# Patient Record
Sex: FEMALE | Race: WHITE | NOT HISPANIC OR LATINO
[De-identification: names, ages, dates, MRNs, and addresses within clinical notes are randomized per-mention and may not be internally consistent; named-entity substitution may affect disease eponyms.]

---
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
## 2021-01-01 ENCOUNTER — EXTERNAL RECORD (OUTPATIENT)
Dept: HEALTH INFORMATION MANAGEMENT | Facility: OTHER | Age: 21
End: 2021-01-01

## 2021-01-13 ENCOUNTER — E-ADVICE (OUTPATIENT)
Dept: BEHAVIORAL HEALTH | Age: 21
End: 2021-01-13

## 2021-01-13 ENCOUNTER — TELEPHONE (OUTPATIENT)
Dept: BEHAVIORAL HEALTH | Age: 21
End: 2021-01-13

## 2021-02-09 ENCOUNTER — TELEPHONE (OUTPATIENT)
Dept: BEHAVIORAL HEALTH | Age: 21
End: 2021-02-09

## 2021-02-16 DIAGNOSIS — Z79.899 HIGH RISK MEDICATION USE: Primary | ICD-10-CM

## 2021-02-16 RX ORDER — METHYLPHENIDATE HYDROCHLORIDE 20 MG/1
TABLET ORAL
Qty: 1 TABLET | Refills: 0 | Status: SHIPPED | COMMUNITY
Start: 2021-02-16 | End: 2021-02-16 | Stop reason: SDUPTHER

## 2021-02-16 RX ORDER — METHYLPHENIDATE HYDROCHLORIDE 20 MG/1
TABLET ORAL
Qty: 1 TABLET | Refills: 0 | Status: SHIPPED | OUTPATIENT
Start: 2021-02-16 | End: 2021-04-19 | Stop reason: SDUPTHER

## 2021-03-03 NOTE — ED PROVIDER NOTES
Patient Seen in: [facility] Emergency Department      History   Patient presents with:  Eval-P    Stated Complaint: eval p     HPI/Subjective:   HPI    Patient has a history of depression, anxiety, with past hospitalizations.   She is attempted suici guarding. Musculoskeletal: No swelling, deformity or ecchymosis. Neurological: Pt is alert and oriented to person, place, and time. No cranial nerve deficit. Skin: Skin is warm and dry. Psychiatric: Normal mood and affect.  Thought content norm

## 2021-03-04 NOTE — ED NOTES
[facility] states they have 1 female bed available and are asking for the transfer packet to be faxed. Awaiting UDS, pregnancy test result, and EKG to fax packet to [facility].

## 2021-03-04 NOTE — ED NOTES
Pt offered toiletries and opportunity to clean up but pt refusing. Pt only requesting mesh panties/pad. Pt also given ice water per request.    Pt up to restroom accompanied by staff. Pt aware family updated on transfer and awaiting ambulance.

## 2021-03-04 NOTE — ED NOTES
Report received from Leander Hall, [place]. Patient is sleeping on the cot in no apparent distress. No needs expressed at this time.

## 2021-03-04 NOTE — ED NOTES
Davonte Corbin is inquiring about pt's level of functioning due to a reported recent diagnosis of Autism. Per ER RN, Randall Ramirez, pt is high functioning and can perform tasks independently. Pt is employed and attends community college.

## 2021-03-04 NOTE — ED NOTES
Per permission granted by patient, spoke with patient's mother, Dominique Bryson, on the phone and gave her an update.

## 2021-03-04 NOTE — ED NOTES
Nurse to nurse report given to Renetta Dago on the [place] unit. They are ready for patient to be transferred and are asking that we call when patient is leaving.  [phone]

## 2021-03-04 NOTE — ED PROVIDER NOTES
No events during my shift.   Patient awaiting placement by [facility]

## 2021-03-04 NOTE — ED NOTES
Report received from [facility]. Pt awake and alert, appears comfortable and in no distress.

## 2021-03-04 NOTE — ED NOTES
Pt awake and alert, skin w/d, resps reg/unlabored. Pt appears comfortable. Belongings given to EMS for transfer.

## 2021-03-04 NOTE — ED NOTES
Pt accepted to [facility]. Accepting doc is Dr. Jarad Chauhan. Pt will be going to [place] unit in the [facility].  Site requests that we call site to complete nurse to nurse at 8:30am. RN aware and site details and nurse to nurse number listed

## 2021-03-04 NOTE — ED NOTES
Pt mother Shannan called pt ok with nursing staff speaking to mother .  Pt mother would like a call back when transferred

## 2021-03-29 ENCOUNTER — TELEPHONE (OUTPATIENT)
Dept: BEHAVIORAL HEALTH | Age: 21
End: 2021-03-29

## 2021-04-02 NOTE — ED PROVIDER NOTES
Patient Seen in: [facility] AND [facility] Emergency Department      History   Patient presents with:  Eval-P    Stated Complaint: needs medical clearance for psych transfer    HPI/Subjective:   HPI    28-year-old female just out of L2 3 days ago with a history tenderness. Neurological: Alert and oriented to person, place, and time. Skin: Skin is warm and dry. Psychiatric: Normal mood and affect. Behavior is normal.  Positive depressive thoughts and suicidality  Nursing note and vitals reviewed.     Differe CBC WITH DIFFERENTIAL WITH PLATELET.   Procedure                               Abnormality         Status                     ---------                               -----------         ------                     CBC W/ DIFFERENTIAL[[id number]]          Abno

## 2021-04-02 NOTE — ED NOTES
Writer called patient's mother and updated her on POC. Patient's mother requests placement for her daughter at [facility] or [facility]. Writer explained there were no beds for pt at [facility] and she would check into [facility]. Mother stated [facility] would be a last resort.  Michael

## 2021-04-02 NOTE — ED QUICK NOTES
Call from [facility]-ER, pts mom, stating that pts aunt Gwen Scruggs was the one who took her to the ER, but mom requesting to be consulted by crisis worker regarding pts plan and information. Phone number for mom: [phone]. Crisis Clinician updated.    Verbal co

## 2021-04-02 NOTE — ED INITIAL ASSESSMENT (HPI)
Dima Cortez is here via EMS for eval of suicidal ideation. She came from [facility] assessment center in [facility]. She has been depressed and has had thoughts of overdosing on medications and strangulation to kill herself.   She has been researching h

## 2021-04-02 NOTE — ED NOTES
Yvonne January reported that are having trouble verifying patient's insurance. Double checked RIN #.

## 2021-04-02 NOTE — ED QUICK NOTES
Pt resting quietly in bed, eating meal tray. Additional PO fluids provided. Bed is in low & locked position. Call light in reach. ER tech outside room for seclusion. Pt aware of plan of care. Will continue to monitor.

## 2021-04-02 NOTE — ED NOTES
William Clifford called and said that [facility] would most likely not have the staff for a 1:1 for patient. She would confirm later after checking other facilities.

## 2021-04-02 NOTE — ED NOTES
Writer spoke to pt's aunt; Cash Sunshine and she reports mother Kimmy Arias) is also involved with patient's discharge planning and care.  Patient is staying with her grandparents at:     [place] [id number]

## 2021-04-03 NOTE — ED QUICK NOTES
Pt's mom exited department. Pt's belongings given from public safety to pts mom to be taken home.   Pt tearful after her mom left, stating that she is lonely and wants her stuffed animal.   This RN found a stuffed animal from the ED, inspected it for safety

## 2021-04-03 NOTE — ED NOTES
Updated mother on patient's insurance information. Patient is considered unfunded and cannot be transferred outside of [facility] until insurance is verified. Writer asked mother to bring patient's daily prescriptions so that she does not miss any doses.

## 2021-04-03 NOTE — ED QUICK NOTES
Pt resting quietly in bed. Bed is in low & locked position. Call light in reach. ER tech outside room for seclusion. Pt aware of plan of care. Will continue to monitor.

## 2021-04-03 NOTE — ED NOTES
Attempted to verify patient's insurance, Darya Afshin [phone] did not have any record of patient. Passport verification is negative for patient insurance.

## 2021-04-03 NOTE — ED NOTES
Called Bee to verify pt benefits. Called [phone]. Spoke with Shereen Holland. They are not seeing any active insurance for Sandy or any eligible dependents associated with this insurance policy.      Shereen Holland states that perhaps the pt was added yesterday if that

## 2021-04-03 NOTE — ED QUICK NOTES
Primary RN out for lunch break. Monitoring and assuming temporary care for patient. Patient requesting trazodone and lorazepam, requesting MD for order.

## 2021-04-03 NOTE — ED QUICK NOTES
Pt eating meal tray. Declines any additional needs at this time. Bed is in low & locked position. Call light in reach. ER tech outside room for seclusion. Pt aware of plan of care. Will continue to monitor.

## 2021-04-03 NOTE — ED QUICK NOTES
Pt's mom arrived at bedside. Aware of plan of care. Pt declines any additional needs at this time. Will continue to monitor.

## 2021-04-03 NOTE — ED QUICK NOTES
Pt sitting quietly in bed. Pt declines any additional needs at this time. Bed is in low & locked position. Call light in reach. ER tech outside room for seclusion. Pt aware of plan of care. Will continue to monitor.

## 2021-04-03 NOTE — ED NOTES
Bhavin - no beds at least until Tuesday   [facility] aware of pt

## 2021-04-03 NOTE — ED PROVIDER NOTES
Patient endorsed to me by Dr. Abbie Kehr for continuation of care. Patient is awaiting inpatient psychiatric transfer for suicidal ideation. Patient has been calm throughout my shift and was endorsed to  [facility] for continuation of care.

## 2021-04-04 NOTE — ED NOTES
I assumed care for this patient. Patient is awaiting inpatient psychiatric transfer for suicidal ideation and has been calm throughout my shift. Patient endorsed to Dr. Chris Vanessa for continuation of care.

## 2021-04-04 NOTE — ED QUICK NOTES
Spoke with [facility] rankdesk personnel for potential placement at [facility]. Report given. Personnel requesting patient labs, and medications (MAR) be faxed to them at [phone].

## 2021-04-04 NOTE — ED NOTES
Call from [address] I with Eliceo Alejo. [place] has been accepted for admission to [facility] under the care of Dr Renetta Pena. She will be admitted to room 2302. Transport can be scheduled to leave the ER at 10 a.m.

## 2021-04-04 NOTE — ED QUICK NOTES
Pt requested additional PO fluids and snack. Both PO fluid and snack provided. Pt pleasant with RN. Continuing to color in coloring book. Bed is in low & locked position. Call light in reach. ER tech outside room for seclusion. Pt aware of plan of care.  Wi

## 2021-04-04 NOTE — ED QUICK NOTES
Report received from [facility]. Pt resting quietly in bed, coloring in coloring book. Bed is in low & locked position. Call light in reach. ER tech outside room for seclusion. Pt aware of plan of care. Will continue to monitor.

## 2021-04-04 NOTE — ED QUICK NOTES
Assumed care for pt @ this time, pt currently resting in bed easily rousable, respirations unlabored, no acute distress. Continuing to monitor. Patient remains under constant visual observation by sitter in safe room.

## 2021-04-04 NOTE — ED QUICK NOTES
Pt now resting quietly in bed. PO fluids within reach. Bed is in low & locked position. Call light in reach. ER tech outside room for seclusion. Pt aware of plan of care. Will continue to monitor.

## 2021-04-04 NOTE — ED QUICK NOTES
Pt now ambulating around room, states that she cannot fall asleep. Pt declines any additional needs at this time. ER tech outside room for seclusion. Pt aware of plan of care. Will continue to monitor.

## 2021-04-12 ENCOUNTER — TELEPHONE (OUTPATIENT)
Dept: BEHAVIORAL HEALTH | Age: 21
End: 2021-04-12

## 2021-04-19 ENCOUNTER — V-VISIT (OUTPATIENT)
Dept: BEHAVIORAL HEALTH | Age: 21
End: 2021-04-19

## 2021-04-19 DIAGNOSIS — F41.1 GAD (GENERALIZED ANXIETY DISORDER): ICD-10-CM

## 2021-04-19 DIAGNOSIS — F90.2 ATTENTION DEFICIT HYPERACTIVITY DISORDER (ADHD), COMBINED TYPE: ICD-10-CM

## 2021-04-19 DIAGNOSIS — E66.9 OBESITY (BMI 30.0-34.9): ICD-10-CM

## 2021-04-19 DIAGNOSIS — F31.60 BIPOLAR 1 DISORDER, MIXED (CMD): Primary | ICD-10-CM

## 2021-04-19 PROCEDURE — 90833 PSYTX W PT W E/M 30 MIN: CPT | Performed by: PSYCHIATRY & NEUROLOGY

## 2021-04-19 PROCEDURE — 99214 OFFICE O/P EST MOD 30 MIN: CPT | Performed by: PSYCHIATRY & NEUROLOGY

## 2021-04-19 RX ORDER — METHYLPHENIDATE HYDROCHLORIDE 60 MG/1
60 CAPSULE, EXTENDED RELEASE ORAL EVERY MORNING
Qty: 30 CAPSULE | Refills: 0 | Status: SHIPPED | OUTPATIENT
Start: 2021-06-14 | End: 2021-07-20

## 2021-04-19 RX ORDER — METHYLPHENIDATE HYDROCHLORIDE 60 MG/1
60 CAPSULE, EXTENDED RELEASE ORAL EVERY MORNING
Qty: 30 CAPSULE | Refills: 0 | Status: SHIPPED | OUTPATIENT
Start: 2021-05-17 | End: 2021-07-20

## 2021-04-19 RX ORDER — LORAZEPAM 1 MG/1
1 TABLET ORAL EVERY 6 HOURS PRN
Status: SHIPPED | COMMUNITY
Start: 2021-04-19 | End: 2021-07-20

## 2021-04-19 RX ORDER — METHYLPHENIDATE HYDROCHLORIDE 20 MG/1
TABLET ORAL
Qty: 30 TABLET | Refills: 0 | Status: SHIPPED | OUTPATIENT
Start: 2021-06-14 | End: 2021-07-20

## 2021-04-19 RX ORDER — METHYLPHENIDATE HYDROCHLORIDE 20 MG/1
TABLET ORAL
Qty: 30 TABLET | Refills: 0 | Status: SHIPPED | OUTPATIENT
Start: 2021-04-19 | End: 2021-07-20

## 2021-04-19 RX ORDER — METHYLPHENIDATE HYDROCHLORIDE 60 MG/1
60 CAPSULE, EXTENDED RELEASE ORAL EVERY MORNING
Qty: 30 CAPSULE | Refills: 0 | Status: SHIPPED | OUTPATIENT
Start: 2021-04-19 | End: 2021-07-20

## 2021-04-19 RX ORDER — TRAZODONE HYDROCHLORIDE 50 MG/1
50 TABLET ORAL NIGHTLY
Qty: 30 TABLET | Refills: 0 | Status: SHIPPED | OUTPATIENT
Start: 2021-04-19 | End: 2021-06-23 | Stop reason: DRUGHIGH

## 2021-04-19 RX ORDER — METHYLPHENIDATE HYDROCHLORIDE 20 MG/1
TABLET ORAL
Qty: 30 TABLET | Refills: 0 | Status: SHIPPED | OUTPATIENT
Start: 2021-05-17 | End: 2021-07-20

## 2021-04-21 ENCOUNTER — TELEPHONE (OUTPATIENT)
Dept: BEHAVIORAL HEALTH | Age: 21
End: 2021-04-21

## 2021-06-08 ENCOUNTER — EXTERNAL RECORD (OUTPATIENT)
Dept: HEALTH INFORMATION MANAGEMENT | Facility: OTHER | Age: 21
End: 2021-06-08

## 2021-06-08 ENCOUNTER — TELEPHONE (OUTPATIENT)
Dept: BEHAVIORAL HEALTH | Age: 21
End: 2021-06-08

## 2021-06-14 PROBLEM — F33.2 RECURRENT MAJOR DEPRESSION-SEVERE (HCC): Status: ACTIVE | Noted: 2021-06-14

## 2021-06-15 PROBLEM — D50.9 MICROCYTIC ANEMIA: Status: ACTIVE | Noted: 2021-06-15

## 2021-06-15 PROBLEM — J30.9 ALLERGIC RHINITIS: Status: ACTIVE | Noted: 2021-06-15

## 2021-06-15 PROBLEM — E66.01 MORBID OBESITY (HCC): Status: ACTIVE | Noted: 2021-06-15

## 2021-06-15 PROBLEM — R73.03 PREDIABETES: Status: ACTIVE | Noted: 2021-06-15

## 2021-06-16 PROBLEM — E61.1 IRON DEFICIENCY: Status: ACTIVE | Noted: 2021-06-16

## 2021-06-23 ENCOUNTER — TELEPHONE (OUTPATIENT)
Dept: BEHAVIORAL HEALTH | Age: 21
End: 2021-06-23

## 2021-06-23 RX ORDER — GABAPENTIN 300 MG/1
300 CAPSULE ORAL 3 TIMES DAILY
Qty: 1 CAPSULE | Status: SHIPPED | COMMUNITY
Start: 2021-06-23 | End: 2021-07-20

## 2021-06-23 RX ORDER — LURASIDONE HYDROCHLORIDE 40 MG/1
40 TABLET, FILM COATED ORAL
Qty: 1 TABLET | Status: SHIPPED | COMMUNITY
Start: 2021-06-23 | End: 2021-07-20 | Stop reason: DRUGHIGH

## 2021-06-23 RX ORDER — SERTRALINE HYDROCHLORIDE 100 MG/1
TABLET, FILM COATED ORAL
Qty: 1 TABLET | Status: SHIPPED | COMMUNITY
Start: 2021-06-23 | End: 2021-07-20

## 2021-06-23 RX ORDER — CETIRIZINE HYDROCHLORIDE 10 MG/1
10 TABLET ORAL DAILY
Qty: 1 TABLET | Status: SHIPPED | COMMUNITY
Start: 2021-06-23

## 2021-06-23 RX ORDER — TRAZODONE HYDROCHLORIDE 150 MG/1
150 TABLET ORAL NIGHTLY
Qty: 1 TABLET | Status: SHIPPED | COMMUNITY
Start: 2021-06-23 | End: 2021-07-20 | Stop reason: SDUPTHER

## 2021-07-06 PROBLEM — F33.2 MDD (MAJOR DEPRESSIVE DISORDER), RECURRENT EPISODE, SEVERE (HCC): Status: ACTIVE | Noted: 2021-07-06

## 2021-07-07 PROBLEM — Z72.89 SELF-INJURIOUS BEHAVIOR: Status: ACTIVE | Noted: 2021-07-07

## 2021-07-20 ENCOUNTER — BEHAVIORAL HEALTH (OUTPATIENT)
Dept: BEHAVIORAL HEALTH | Age: 21
End: 2021-07-20

## 2021-07-20 VITALS
DIASTOLIC BLOOD PRESSURE: 74 MMHG | WEIGHT: 293 LBS | SYSTOLIC BLOOD PRESSURE: 122 MMHG | BODY MASS INDEX: 48.82 KG/M2 | HEART RATE: 96 BPM | OXYGEN SATURATION: 97 % | HEIGHT: 65 IN

## 2021-07-20 DIAGNOSIS — F34.81 DMDD (DISRUPTIVE MOOD DYSREGULATION DISORDER) (CMD): ICD-10-CM

## 2021-07-20 DIAGNOSIS — F90.2 ATTENTION DEFICIT HYPERACTIVITY DISORDER (ADHD), COMBINED TYPE: ICD-10-CM

## 2021-07-20 DIAGNOSIS — F41.1 GAD (GENERALIZED ANXIETY DISORDER): ICD-10-CM

## 2021-07-20 DIAGNOSIS — F31.60 BIPOLAR 1 DISORDER, MIXED (CMD): Primary | ICD-10-CM

## 2021-07-20 DIAGNOSIS — Z63.9 CONFLICT BETWEEN PATIENT AND FAMILY: ICD-10-CM

## 2021-07-20 DIAGNOSIS — E66.9 OBESITY (BMI 30.0-34.9): ICD-10-CM

## 2021-07-20 PROCEDURE — 90833 PSYTX W PT W E/M 30 MIN: CPT | Performed by: PSYCHIATRY & NEUROLOGY

## 2021-07-20 PROCEDURE — 99214 OFFICE O/P EST MOD 30 MIN: CPT | Performed by: PSYCHIATRY & NEUROLOGY

## 2021-07-20 RX ORDER — SERTRALINE HYDROCHLORIDE 100 MG/1
200 TABLET, FILM COATED ORAL DAILY
Qty: 60 TABLET | Refills: 0 | Status: SHIPPED | OUTPATIENT
Start: 2021-07-20

## 2021-07-20 RX ORDER — LURASIDONE HYDROCHLORIDE 60 MG/1
60 TABLET, FILM COATED ORAL NIGHTLY
Qty: 30 TABLET | Refills: 0 | Status: SHIPPED | COMMUNITY
Start: 2021-07-20 | End: 2021-07-20 | Stop reason: SDUPTHER

## 2021-07-20 RX ORDER — LURASIDONE HYDROCHLORIDE 60 MG/1
60 TABLET, FILM COATED ORAL NIGHTLY
Qty: 30 TABLET | Refills: 0 | Status: SHIPPED | OUTPATIENT
Start: 2021-07-20

## 2021-07-20 RX ORDER — SERTRALINE HYDROCHLORIDE 25 MG/1
25 TABLET, FILM COATED ORAL DAILY
Qty: 30 TABLET | Refills: 0 | Status: SHIPPED | COMMUNITY
Start: 2021-07-20 | End: 2021-07-20 | Stop reason: SDUPTHER

## 2021-07-20 RX ORDER — TRAZODONE HYDROCHLORIDE 150 MG/1
150 TABLET ORAL NIGHTLY
Qty: 30 TABLET | Refills: 0 | Status: SHIPPED | OUTPATIENT
Start: 2021-07-20

## 2021-07-20 RX ORDER — SERTRALINE HYDROCHLORIDE 100 MG/1
200 TABLET, FILM COATED ORAL DAILY
Qty: 60 TABLET | Refills: 0 | Status: SHIPPED | COMMUNITY
Start: 2021-07-20 | End: 2021-07-20 | Stop reason: SDUPTHER

## 2021-07-20 RX ORDER — SERTRALINE HYDROCHLORIDE 25 MG/1
25 TABLET, FILM COATED ORAL DAILY
Qty: 30 TABLET | Refills: 0 | Status: SHIPPED | OUTPATIENT
Start: 2021-07-20 | End: 2021-08-23

## 2021-07-20 SDOH — SOCIAL STABILITY - SOCIAL INSECURITY: PROBLEM RELATED TO PRIMARY SUPPORT GROUP, UNSPECIFIED: Z63.9

## 2021-07-30 ENCOUNTER — OFFICE VISIT (OUTPATIENT)
Dept: FAMILY MEDICINE CLINIC | Facility: CLINIC | Age: 21
End: 2021-07-30
Payer: COMMERCIAL

## 2021-07-30 VITALS
HEIGHT: 65 IN | BODY MASS INDEX: 48.82 KG/M2 | DIASTOLIC BLOOD PRESSURE: 70 MMHG | HEART RATE: 107 BPM | SYSTOLIC BLOOD PRESSURE: 128 MMHG | WEIGHT: 293 LBS | RESPIRATION RATE: 18 BRPM | TEMPERATURE: 98 F | OXYGEN SATURATION: 99 %

## 2021-07-30 DIAGNOSIS — F33.2 SEVERE EPISODE OF RECURRENT MAJOR DEPRESSIVE DISORDER, WITHOUT PSYCHOTIC FEATURES (HCC): ICD-10-CM

## 2021-07-30 DIAGNOSIS — Z30.011 INITIATION OF OCP (BCP): Primary | ICD-10-CM

## 2021-07-30 LAB — CONTROL LINE PRESENT WITH A CLEAR BACKGROUND (YES/NO): YES YES/NO

## 2021-07-30 PROCEDURE — 3008F BODY MASS INDEX DOCD: CPT | Performed by: FAMILY MEDICINE

## 2021-07-30 PROCEDURE — 3078F DIAST BP <80 MM HG: CPT | Performed by: FAMILY MEDICINE

## 2021-07-30 PROCEDURE — 3074F SYST BP LT 130 MM HG: CPT | Performed by: FAMILY MEDICINE

## 2021-07-30 PROCEDURE — 81025 URINE PREGNANCY TEST: CPT | Performed by: FAMILY MEDICINE

## 2021-07-30 PROCEDURE — 99214 OFFICE O/P EST MOD 30 MIN: CPT | Performed by: FAMILY MEDICINE

## 2021-07-30 RX ORDER — IBUPROFEN 600 MG/1
TABLET ORAL
COMMUNITY
Start: 2021-06-22 | End: 2021-08-19

## 2021-07-30 RX ORDER — GABAPENTIN 300 MG/1
300 CAPSULE ORAL EVERY 6 HOURS PRN
COMMUNITY
Start: 2021-05-19 | End: 2021-09-20

## 2021-07-30 RX ORDER — NEOMYCIN/POLYMYXIN B/PRAMOXINE 3.5-10K-1
CREAM (GRAM) TOPICAL
COMMUNITY
End: 2021-08-19

## 2021-07-30 RX ORDER — NORGESTIMATE AND ETHINYL ESTRADIOL 7DAYSX3 LO
1 KIT ORAL DAILY
Qty: 28 TABLET | Refills: 0 | Status: SHIPPED | OUTPATIENT
Start: 2021-07-30 | End: 2021-08-23

## 2021-07-30 NOTE — PROGRESS NOTES
Patient presents with: Follow - Up: EKG review        HPI:    Patient ID: [place] is a 24year old adult referred back to PCP to review recent EKG done at [facility].   Admitted to [facility] from 7/6 to 7/19 for depression, anxiety and borderli total) by mouth daily. 30 tablet 1   • traZODone HCl 150 MG Oral Tab Take 1 tablet (150 mg total) by mouth nightly. 30 tablet 1   • Cholecalciferol (VITAMIN D) 50 MCG (2000 UT) Oral Cap Take 2,000 Units by mouth daily.      • Fluticasone Propionate 50 MCG/A Rhythm: Normal rate and regular rhythm. Heart sounds: No murmur heard. Pulmonary:      Effort: Pulmonary effort is normal.      Breath sounds: Normal breath sounds. Abdominal:      Tenderness: There is no abdominal tenderness.    Musculoskeletal: Encounter      Urine Preg Test      Meds This Visit:  Requested Prescriptions     Signed Prescriptions Disp Refills   • Norgestim-Eth Estrad Triphasic (ORTHO TRI-CYCLEN LO) 0.18/0.215/0.25 MG-25 MCG Oral Tab 28 tablet 0     Sig: Take 1 tablet by mouth minerva

## 2021-08-15 PROBLEM — F31.60 BIPOLAR 1 DISORDER, MIXED (CMD): Status: ACTIVE | Noted: 2021-08-15

## 2021-08-20 PROBLEM — Z30.41 ORAL CONTRACEPTIVE USE: Status: ACTIVE | Noted: 2021-08-20

## 2021-08-23 RX ORDER — SERTRALINE HYDROCHLORIDE 25 MG/1
TABLET, FILM COATED ORAL
Qty: 30 TABLET | Refills: 0 | Status: SHIPPED | OUTPATIENT
Start: 2021-08-23 | End: 2021-09-20

## 2021-08-23 NOTE — TELEPHONE ENCOUNTER
Please call and check if patient started her birth control. Due for follow-up before refill.   Okay to give bridging prescription if she will be out of medication before she can get into see me

## 2021-08-23 NOTE — TELEPHONE ENCOUNTER
Gynecology Medication Protocol [id number]/23/2021 12:34 AM   PASS-PENDING LAST PAP WNL--VIA MANUAL LOOKUP    Physical or Pelvic/Breast in past 12 or next 3 mos--VIA MANUAL LOOKUP     OV 7/30/21  Refill 7/30/21 #28 0 refill  No future appointments.

## 2021-08-27 RX ORDER — NORGESTIMATE AND ETHINYL ESTRADIOL
KIT
Qty: 1 EACH | Refills: 0 | Status: SHIPPED | OUTPATIENT
Start: 2021-08-27 | End: 2021-09-20

## 2021-08-27 NOTE — TELEPHONE ENCOUNTER
Pt states she did start her birth control 8/3/21. OV scheduled. Future Appointments   Date Time Provider Joel Borjai   9/8/2021 11:00 AM MD JOYCE RiversOSW [facility]        Rx sent.

## 2021-08-30 ENCOUNTER — HOSPITAL ENCOUNTER (EMERGENCY)
Facility: HOSPITAL | Age: 21
Discharge: NURSING, REHAB OR LONG-TERM CARE | End: 2021-08-31
Attending: EMERGENCY MEDICINE
Payer: COMMERCIAL

## 2021-08-30 DIAGNOSIS — F32.A DEPRESSION, UNSPECIFIED DEPRESSION TYPE: Primary | ICD-10-CM

## 2021-08-30 DIAGNOSIS — R45.851 SUICIDAL IDEATION: ICD-10-CM

## 2021-08-30 PROCEDURE — 99285 EMERGENCY DEPT VISIT HI MDM: CPT

## 2021-08-30 PROCEDURE — 36415 COLL VENOUS BLD VENIPUNCTURE: CPT

## 2021-08-31 ENCOUNTER — APPOINTMENT (OUTPATIENT)
Dept: BEHAVIORAL HEALTH | Age: 21
End: 2021-08-31

## 2021-08-31 ENCOUNTER — TELEPHONE (OUTPATIENT)
Dept: BEHAVIORAL HEALTH | Age: 21
End: 2021-08-31

## 2021-08-31 VITALS
RESPIRATION RATE: 18 BRPM | BODY MASS INDEX: 48.82 KG/M2 | HEART RATE: 90 BPM | OXYGEN SATURATION: 99 % | TEMPERATURE: 98 F | HEIGHT: 65 IN | WEIGHT: 293 LBS | SYSTOLIC BLOOD PRESSURE: 135 MMHG | DIASTOLIC BLOOD PRESSURE: 84 MMHG

## 2021-08-31 LAB
ALBUMIN SERPL-MCNC: 3.7 G/DL (ref 3.4–5)
ALBUMIN/GLOB SERPL: 0.8 {RATIO} (ref 1–2)
ALP LIVER SERPL-CCNC: 75 U/L
ALT SERPL-CCNC: 28 U/L
AMPHET UR QL SCN: NEGATIVE
ANION GAP SERPL CALC-SCNC: 8 MMOL/L (ref 0–18)
APAP SERPL-MCNC: <2 UG/ML (ref 10–30)
AST SERPL-CCNC: 36 U/L (ref 15–37)
B-HCG UR QL: NEGATIVE
BASOPHILS # BLD AUTO: 0.04 X10(3) UL (ref 0–0.2)
BASOPHILS NFR BLD AUTO: 0.4 %
BENZODIAZ UR QL SCN: NEGATIVE
BILIRUB SERPL-MCNC: 0.3 MG/DL (ref 0.1–2)
BILIRUB UR QL STRIP.AUTO: NEGATIVE
BUN BLD-MCNC: 16 MG/DL (ref 7–18)
CALCIUM BLD-MCNC: 8.5 MG/DL (ref 8.5–10.1)
CANNABINOIDS UR QL SCN: NEGATIVE
CHLORIDE SERPL-SCNC: 106 MMOL/L (ref 98–112)
CO2 SERPL-SCNC: 23 MMOL/L (ref 21–32)
COCAINE UR QL: NEGATIVE
COLOR UR AUTO: YELLOW
CREAT BLD-MCNC: 0.64 MG/DL
CREAT UR-SCNC: 197 MG/DL
EOSINOPHIL # BLD AUTO: 0.14 X10(3) UL (ref 0–0.7)
EOSINOPHIL NFR BLD AUTO: 1.4 %
ERYTHROCYTE [DISTWIDTH] IN BLOOD BY AUTOMATED COUNT: 14.4 %
ETHANOL SERPL-MCNC: <3 MG/DL (ref ?–3)
GLOBULIN PLAS-MCNC: 4.6 G/DL (ref 2.8–4.4)
GLUCOSE BLD-MCNC: 106 MG/DL (ref 70–99)
GLUCOSE UR STRIP.AUTO-MCNC: NEGATIVE MG/DL
HCT VFR BLD AUTO: 39 %
HGB BLD-MCNC: 11.9 G/DL
IMM GRANULOCYTES # BLD AUTO: 0.02 X10(3) UL (ref 0–1)
IMM GRANULOCYTES NFR BLD: 0.2 %
KETONES UR STRIP.AUTO-MCNC: NEGATIVE MG/DL
LYMPHOCYTES # BLD AUTO: 2.69 X10(3) UL (ref 1.5–5)
LYMPHOCYTES NFR BLD AUTO: 26.2 %
M PROTEIN MFR SERPL ELPH: 8.3 G/DL (ref 6.4–8.2)
MCH RBC QN AUTO: 23.8 PG (ref 26–34)
MCHC RBC AUTO-ENTMCNC: 30.5 G/DL (ref 31–37)
MCV RBC AUTO: 77.8 FL
MDMA UR QL SCN: NEGATIVE
MONOCYTES # BLD AUTO: 0.65 X10(3) UL (ref 0.1–1)
MONOCYTES NFR BLD AUTO: 6.3 %
NEUTROPHILS # BLD AUTO: 6.74 X10 (3) UL (ref 1.5–7.7)
NEUTROPHILS # BLD AUTO: 6.74 X10(3) UL (ref 1.5–7.7)
NEUTROPHILS NFR BLD AUTO: 65.5 %
NITRITE UR QL STRIP.AUTO: NEGATIVE
OPIATES UR QL SCN: NEGATIVE
OSMOLALITY SERPL CALC.SUM OF ELEC: 286 MOSM/KG (ref 275–295)
OXYCODONE UR QL SCN: NEGATIVE
PH UR STRIP.AUTO: 5 [PH] (ref 5–8)
PLATELET # BLD AUTO: 328 10(3)UL (ref 150–450)
POTASSIUM SERPL-SCNC: 4 MMOL/L (ref 3.5–5.1)
PROT UR STRIP.AUTO-MCNC: 30 MG/DL
RBC # BLD AUTO: 5.01 X10(6)UL
RBC #/AREA URNS AUTO: >10 /HPF
SALICYLATES SERPL-MCNC: <1.7 MG/DL (ref 2.8–20)
SARS-COV-2 RNA RESP QL NAA+PROBE: NOT DETECTED
SODIUM SERPL-SCNC: 137 MMOL/L (ref 136–145)
SP GR UR STRIP.AUTO: 1.03 (ref 1–1.03)
UROBILINOGEN UR STRIP.AUTO-MCNC: <2 MG/DL
WBC # BLD AUTO: 10.3 X10(3) UL (ref 4–11)

## 2021-08-31 PROCEDURE — 80179 DRUG ASSAY SALICYLATE: CPT | Performed by: EMERGENCY MEDICINE

## 2021-08-31 PROCEDURE — 81025 URINE PREGNANCY TEST: CPT

## 2021-08-31 PROCEDURE — 80143 DRUG ASSAY ACETAMINOPHEN: CPT | Performed by: EMERGENCY MEDICINE

## 2021-08-31 PROCEDURE — 80307 DRUG TEST PRSMV CHEM ANLYZR: CPT | Performed by: EMERGENCY MEDICINE

## 2021-08-31 PROCEDURE — 82077 ASSAY SPEC XCP UR&BREATH IA: CPT | Performed by: EMERGENCY MEDICINE

## 2021-08-31 PROCEDURE — 81001 URINALYSIS AUTO W/SCOPE: CPT | Performed by: EMERGENCY MEDICINE

## 2021-08-31 PROCEDURE — 87086 URINE CULTURE/COLONY COUNT: CPT | Performed by: EMERGENCY MEDICINE

## 2021-08-31 PROCEDURE — 80053 COMPREHEN METABOLIC PANEL: CPT | Performed by: EMERGENCY MEDICINE

## 2021-08-31 PROCEDURE — 85025 COMPLETE CBC W/AUTO DIFF WBC: CPT | Performed by: EMERGENCY MEDICINE

## 2021-08-31 RX ORDER — ONDANSETRON 4 MG/1
4 TABLET, ORALLY DISINTEGRATING ORAL ONCE
Status: COMPLETED | OUTPATIENT
Start: 2021-08-31 | End: 2021-08-31

## 2021-08-31 RX ORDER — ONDANSETRON 4 MG/1
TABLET, ORALLY DISINTEGRATING ORAL
Status: COMPLETED
Start: 2021-08-31 | End: 2021-08-31

## 2021-08-31 NOTE — PROGRESS NOTES
08/31/21 0235   Assessment Summary   Re-assessment Summary Pt was assessed on 8/19/21 due to SI with plan and intent. On 8/30/21, pt cut self with broken plastic silveware and states that she has had an increase in SI.  Pt reports her plan is to strangle twice in the last week, reports they tried to strangle themselves with a T-shirt on 8/10, and tried to strangle themselves with a belt on 8/17. Evy Freeman reports they had a therapy appointment today and their therapist said to come in to get an assessment.  Cassandra Ideation: Attempt  Describe: Jennifer Yoselin reports history of suicide attempts by strangulation.       Family History or Personal Lived Experience of Loss or Near Loss by Suicide: Yes   Describe loss(es): friend from residential                    Non-Suicidal Self Stress Disorder, Major Depressive Disorder, Generalized Anxiety Disorder, ADHD, Borderline Personality Disorder and Autism Spectrum Disorder. Sandy reports last inpatient was with [facility] in July of 2021.  Sandy reports seeing a therapist weekly and psychiatris Blunted  Stability of Affect: Stable  Attitude toward staff: Aline familiar; Co-operative;Open;Warm  Speech  Rate of Speech: Appropriate  Flow of Speech: Appropriate  Intensity of Volume: Ordinary  Clarity: Clear  Cognition  Concentration: Unimpaired  Vincenzo history of anxiety, no panic attacks, symptoms include heart racing, feeling tense, feeling overwhelmed and having racing thoughts. Sandy reports appetite has been fluctuating, having 2-3 meals a day and denies weight changes.  Sandy reports sleeping 6-7 ho Diagnoses              Shirley Hanson LCSW            Electronically signed by Adiel Galdamez LCSW at 8/19/2021  5:09 PM

## 2021-08-31 NOTE — ED PROVIDER NOTES
Patient Seen in: [facility] Emergency Department      History   Patient presents with:  Eval-P    Stated Complaint: eval p/self harm/cutting    HPI/Subjective:   HPI    Patient is a 71-year-old female comes emergency room for psychiatric evaluation. sclerae white, conjunctivae pink, oropharynx unremarkable  NECK: Supple  LUNGS: Clear to auscultation bilaterally  CARDIOVASCULAR: Regular rate and rhythm, normal S1-S2, no S3-S4 or murmur  ABDOMINAL: Soft, nontender, nondistended  SKIN: Multiple superfici With Differential With Platelet.   Procedure                               Abnormality         Status                     ---------                               -----------         ------                     CBC W/ DIFFERENTIAL[[id number]]          Abnormal

## 2021-08-31 NOTE — ED INITIAL ASSESSMENT (HPI)
Pt with self harm tonight, lacerations to right forearm and left leg. Feeling suicidal. Denies etoh or drug use.

## 2021-08-31 NOTE — ED NOTES
Covid staffing with nurse supervisor Elvie Lawson: Pt is able to have a roommate per her Covid screening answers.

## 2021-08-31 NOTE — ED QUICK NOTES
This RN informed pt pulled out own IV, RN asked patient why she did that patient states \"it's a sensory issue\"

## 2021-08-31 NOTE — ED QUICK NOTES
Pt used call light and ambulated to br. Pt denies need for new menstrual pad. Pt provided with menu and ordered breakfast. Denies need for water.

## 2021-08-31 NOTE — ED QUICK NOTES
[facility] E. T.A, is 45 minutes

## 2021-08-31 NOTE — ED QUICK NOTES
Patient belongings in smart safe bag # [id number] sealed and given to security.  Patient has ring on due to religous reason \" [employer]\"

## 2021-08-31 NOTE — ED QUICK NOTES
Report given to [facility]. Patient to go to [facility].

## 2021-08-31 NOTE — ED QUICK NOTES
Patients mom called, this RN explained to patient and she requested to talk to mom on the phone. Patient was given phone to speak to mom and then she became angry, threw phone on the phone and started bagging and kicking the walls.  This RN called her mom a

## 2021-08-31 NOTE — ED PROVIDER NOTES
Yash Ryan endorsed to myself this morning. She is a 18-year-old female comes to the emergency room for a psychiatric evaluation. Patient reported she is suicidal.  She just got discharged [facility] a couple of days ago.   Patient cut her right arm and

## 2021-09-07 NOTE — ED INITIAL ASSESSMENT (HPI)
Pt presents from [facility] for eval of suicidal thoughts x 3 months [Follow-Up: _____] : a [unfilled] follow-up visit

## 2021-09-15 ENCOUNTER — TELEPHONE (OUTPATIENT)
Dept: ORTHOPEDICS CLINIC | Facility: CLINIC | Age: 21
End: 2021-09-15

## 2021-09-15 ENCOUNTER — OFFICE VISIT (OUTPATIENT)
Dept: FAMILY MEDICINE CLINIC | Facility: CLINIC | Age: 21
End: 2021-09-15
Payer: COMMERCIAL

## 2021-09-15 VITALS
WEIGHT: 293 LBS | HEIGHT: 65 IN | SYSTOLIC BLOOD PRESSURE: 124 MMHG | BODY MASS INDEX: 48.82 KG/M2 | OXYGEN SATURATION: 98 % | HEART RATE: 78 BPM | RESPIRATION RATE: 18 BRPM | DIASTOLIC BLOOD PRESSURE: 80 MMHG | TEMPERATURE: 98 F

## 2021-09-15 DIAGNOSIS — Z30.41 SURVEILLANCE FOR BIRTH CONTROL, ORAL CONTRACEPTIVES: Primary | ICD-10-CM

## 2021-09-15 DIAGNOSIS — M25.561 CHRONIC PAIN OF BOTH KNEES: ICD-10-CM

## 2021-09-15 DIAGNOSIS — G89.29 CHRONIC PAIN OF BOTH KNEES: ICD-10-CM

## 2021-09-15 DIAGNOSIS — M25.572 BILATERAL ANKLE PAIN, UNSPECIFIED CHRONICITY: Primary | ICD-10-CM

## 2021-09-15 DIAGNOSIS — M25.562 CHRONIC PAIN OF BOTH KNEES: ICD-10-CM

## 2021-09-15 DIAGNOSIS — M25.571 BILATERAL ANKLE PAIN, UNSPECIFIED CHRONICITY: Primary | ICD-10-CM

## 2021-09-15 PROCEDURE — 3079F DIAST BP 80-89 MM HG: CPT | Performed by: FAMILY MEDICINE

## 2021-09-15 PROCEDURE — 99213 OFFICE O/P EST LOW 20 MIN: CPT | Performed by: FAMILY MEDICINE

## 2021-09-15 PROCEDURE — 3074F SYST BP LT 130 MM HG: CPT | Performed by: FAMILY MEDICINE

## 2021-09-15 PROCEDURE — 3008F BODY MASS INDEX DOCD: CPT | Performed by: FAMILY MEDICINE

## 2021-09-15 NOTE — TELEPHONE ENCOUNTER
Requisite for xray order  Reviewed patients chart, xray orders are required.  Order placed for bilateral ankle xrays

## 2021-09-15 NOTE — TELEPHONE ENCOUNTER
Please enter an Xray RX for a KB Ankles for  this patient's upcoming appointment, as the patient has not had any imaging in a few years following a FX.   Patient was instructed to get X-rays before appt, so they will be calling [phone] to get schedule

## 2021-09-15 NOTE — PROGRESS NOTES
Patient presents with:  Contraception: Follow up        HPI:    Patient ID: [place] is a 24year old here with mom to follow up since OCP initiation. Tolerating well. No med s/e. Does not feel it has effected her mood adversely.  Has appt scheduled 2   • Bipolar Disorder Sister    • Cancer Neg    • Stroke Neg       Social History: Social History    Tobacco Use      Smoking status: Never Smoker      Smokeless tobacco: Never Used    Vaping Use      Vaping Use: Never used    Alcohol use: No    Drug use: sooner as needed    No orders of the defined types were placed in this encounter.       Meds This Visit:  Requested Prescriptions      No prescriptions requested or ordered in this encounter       Imaging & Referrals:  None

## 2021-09-20 RX ORDER — SERTRALINE HYDROCHLORIDE 25 MG/1
TABLET, FILM COATED ORAL
Qty: 30 TABLET | Refills: 0 | Status: SHIPPED | OUTPATIENT
Start: 2021-09-20

## 2021-09-20 RX ORDER — NORGESTIMATE AND ETHINYL ESTRADIOL
KIT
Qty: 28 TABLET | Refills: 11 | Status: SHIPPED | OUTPATIENT
Start: 2021-09-20

## 2021-09-21 ENCOUNTER — HOSPITAL ENCOUNTER (OUTPATIENT)
Dept: GENERAL RADIOLOGY | Age: 21
Discharge: HOME | End: 2021-09-21
Attending: ORTHOPAEDIC SURGERY
Payer: COMMERCIAL

## 2021-09-21 DIAGNOSIS — M25.572 BILATERAL ANKLE PAIN, UNSPECIFIED CHRONICITY: ICD-10-CM

## 2021-09-21 DIAGNOSIS — M25.571 BILATERAL ANKLE PAIN, UNSPECIFIED CHRONICITY: ICD-10-CM

## 2021-09-21 PROCEDURE — 73610 X-RAY EXAM OF ANKLE: CPT | Performed by: ORTHOPAEDIC SURGERY

## 2021-09-29 ENCOUNTER — OFFICE VISIT (OUTPATIENT)
Dept: ORTHOPEDICS CLINIC | Facility: CLINIC | Age: 21
End: 2021-09-29
Payer: COMMERCIAL

## 2021-09-29 VITALS — OXYGEN SATURATION: 99 % | HEART RATE: 95 BPM

## 2021-09-29 DIAGNOSIS — M25.571 BILATERAL ANKLE PAIN, UNSPECIFIED CHRONICITY: Primary | ICD-10-CM

## 2021-09-29 DIAGNOSIS — M25.572 BILATERAL ANKLE PAIN, UNSPECIFIED CHRONICITY: Primary | ICD-10-CM

## 2021-09-29 DIAGNOSIS — E66.01 MORBID OBESITY (HCC): ICD-10-CM

## 2021-09-29 PROCEDURE — 99204 OFFICE O/P NEW MOD 45 MIN: CPT | Performed by: ORTHOPAEDIC SURGERY

## 2021-10-03 NOTE — H&P
[facility] - ORTHOPEDICS  [facility] [address] [id number] [phone]     NEW PATIENT VISIT - HISTORY AND PHYSICAL EXAMINATION     Name: Helen Esparza   MRN: [id number]  Date: 9/29/2021     CC: Phillip a Take 1 capsule (300 mg total) by mouth every 6 (six) hours as needed.  30 capsule 1   • lurasidone 20 MG Oral Tab Take 1 tablet (20 mg total) by mouth daily with breakfast. 30 tablet 2   • lurasidone 60 MG Oral Tab Take 1 tablet (60 mg total) by mouth at be noted.   Distal neurovascular exam demonstrates normal perfusion, intact sensation to light touch and full strength.      Radiographic Examination/Diagnostics:  Bilateral ankle XR personally viewed, independently interpreted and radiology report was reviewe prior to completion, some grammatical, spelling, and word choice errors due to dictation may still occur.

## 2021-10-04 ENCOUNTER — TELEPHONE (OUTPATIENT)
Dept: PHYSICAL THERAPY | Facility: HOSPITAL | Age: 21
End: 2021-10-04

## 2021-10-07 ENCOUNTER — OFFICE VISIT (OUTPATIENT)
Dept: PHYSICAL THERAPY | Age: 21
End: 2021-10-07
Attending: ORTHOPAEDIC SURGERY
Payer: COMMERCIAL

## 2021-10-07 DIAGNOSIS — M25.572 BILATERAL ANKLE PAIN, UNSPECIFIED CHRONICITY: ICD-10-CM

## 2021-10-07 DIAGNOSIS — M25.571 BILATERAL ANKLE PAIN, UNSPECIFIED CHRONICITY: ICD-10-CM

## 2021-10-07 PROCEDURE — 97110 THERAPEUTIC EXERCISES: CPT

## 2021-10-07 PROCEDURE — 97162 PT EVAL MOD COMPLEX 30 MIN: CPT

## 2021-10-07 NOTE — PROGRESS NOTES
LOWER EXTREMITY EVALUATION:   Referring Physician: Dr. Radha Akers  Diagnosis: Bilateral ankle pain, unspecified chronicity (M25.571,M25.572)       PATIENT Neva Alva is a 24year old y/o adult who presents to therapy today with the below sympto PTSD (post-traumatic stress disorder). ASSESSMENT:   Arti Wright is a 24year old year old adult who presents to physical therapy with bilateral ankle pain with onset in May 2021.  The results of the objective tests and measures show decreased lower Goals:   To be reached in 4 weeks    · Pt will demonstrate improved DF AROM to >= 10 degrees to promote proper foot clearance during gait and greater ease descending stairs without compensation  · Pt will have increased ankle strength to 4+/5 throughout f

## 2021-10-12 ENCOUNTER — LAB ENCOUNTER (OUTPATIENT)
Dept: LAB | Age: 21
End: 2021-10-12
Attending: NURSE PRACTITIONER
Payer: COMMERCIAL

## 2021-10-12 DIAGNOSIS — E55.9 VITAMIN D DEFICIENCY: ICD-10-CM

## 2021-10-12 PROCEDURE — 36415 COLL VENOUS BLD VENIPUNCTURE: CPT

## 2021-10-12 PROCEDURE — 82306 VITAMIN D 25 HYDROXY: CPT

## 2021-10-13 NOTE — PROGRESS NOTES
Low vitamin D ordering Vitamin D 50,000 international units weekly, to take for 12 weeks, then after 12 weeks another lab. Will then have you take over the counter Vitamin D 3000 international units daily.

## 2021-10-14 ENCOUNTER — APPOINTMENT (OUTPATIENT)
Dept: PHYSICAL THERAPY | Age: 21
End: 2021-10-14
Attending: ORTHOPAEDIC SURGERY
Payer: COMMERCIAL

## 2021-10-14 PROBLEM — E55.9 VITAMIN D DEFICIENCY: Status: ACTIVE | Noted: 2021-10-14

## 2021-10-21 ENCOUNTER — OFFICE VISIT (OUTPATIENT)
Dept: PHYSICAL THERAPY | Age: 21
End: 2021-10-21
Attending: ORTHOPAEDIC SURGERY
Payer: COMMERCIAL

## 2021-10-21 PROCEDURE — 97110 THERAPEUTIC EXERCISES: CPT

## 2021-10-21 NOTE — PROGRESS NOTES
Diagnosis: Bilateral ankle pain, unspecified chronicity (M25.571,M25.572)  Insurance (Authorized # of Visits):  Arturo Bernheim Physician: Dr. Scot Agudelo MD visit: none scheduled  Fall Risk: standard         Precautions: n/a             Subjective:

## 2021-10-23 RX ORDER — UBIDECARENONE 100 MG
CAPSULE ORAL
Qty: 90 CAPSULE | Refills: 0 | OUTPATIENT
Start: 2021-10-23

## 2021-10-26 ENCOUNTER — OFFICE VISIT (OUTPATIENT)
Dept: PHYSICAL THERAPY | Age: 21
End: 2021-10-26
Attending: ORTHOPAEDIC SURGERY
Payer: COMMERCIAL

## 2021-10-26 PROCEDURE — 97110 THERAPEUTIC EXERCISES: CPT

## 2021-10-26 NOTE — PROGRESS NOTES
Diagnosis: Bilateral ankle pain, unspecified chronicity (M25.571,M25.572)  Insurance (Authorized # of Visits):  Obdulia Rollins Physician: Dr. Sreekanth Agudelo MD visit: none scheduled  Fall Risk: standard         Precautions: n/a             Subjective: proprioceptive exercises, patient education, and HEP progression.       Charges: 3TE       Total Timed Treatment: 45 min  Total Treatment Time: 45 min

## 2021-10-28 ENCOUNTER — APPOINTMENT (OUTPATIENT)
Dept: PHYSICAL THERAPY | Age: 21
End: 2021-10-28
Attending: ORTHOPAEDIC SURGERY
Payer: COMMERCIAL

## 2021-11-02 ENCOUNTER — TELEPHONE (OUTPATIENT)
Dept: PHYSICAL THERAPY | Age: 21
End: 2021-11-02

## 2021-11-02 ENCOUNTER — APPOINTMENT (OUTPATIENT)
Dept: PHYSICAL THERAPY | Age: 21
End: 2021-11-02
Attending: ORTHOPAEDIC SURGERY

## 2021-11-02 NOTE — TELEPHONE ENCOUNTER
Patient did not show for appointment today. This is patients second no-show for physical therapy. Left patient message to inform her the following appointments have been cancelled due to two consecutive no-show or no call.  Informed patient she can call juan david

## 2021-11-04 ENCOUNTER — APPOINTMENT (OUTPATIENT)
Dept: PHYSICAL THERAPY | Age: 21
End: 2021-11-04
Attending: ORTHOPAEDIC SURGERY

## 2021-11-05 ENCOUNTER — HOSPITAL ENCOUNTER (OUTPATIENT)
Age: 21
Discharge: HOME | End: 2021-11-05
Payer: COMMERCIAL

## 2021-11-05 VITALS
HEART RATE: 105 BPM | SYSTOLIC BLOOD PRESSURE: 143 MMHG | WEIGHT: 293 LBS | OXYGEN SATURATION: 100 % | BODY MASS INDEX: 48.82 KG/M2 | DIASTOLIC BLOOD PRESSURE: 86 MMHG | RESPIRATION RATE: 20 BRPM | HEIGHT: 65 IN | TEMPERATURE: 97 F

## 2021-11-05 DIAGNOSIS — R03.0 ELEVATED BLOOD PRESSURE READING: Primary | ICD-10-CM

## 2021-11-05 PROCEDURE — 99203 OFFICE O/P NEW LOW 30 MIN: CPT | Performed by: PHYSICIAN ASSISTANT

## 2021-11-05 RX ORDER — SPIRONOLACTONE 25 MG/1
25 TABLET ORAL DAILY
COMMUNITY

## 2021-11-05 RX ORDER — HYDROCHLOROTHIAZIDE 25 MG/1
25 TABLET ORAL DAILY
COMMUNITY

## 2021-11-05 NOTE — ED PROVIDER NOTES
Patient Seen in: Immediate [address] [place] [address]      History   Patient presents with:  Blood Pressure    Stated Complaint: HIGH BLOOD PRESSURE    Subjective:   HPI    16-year-old patient presents to the [address] [place] [address] with mother for evaluation of elevated blood pressure fredo 5\")   Wt (!) 166 kg   LMP 11/04/2021 (Exact Date)   SpO2 100%   Breastfeeding No   BMI 60.91 kg/m²         Physical Exam  Vitals and nursing note reviewed. Constitutional:       Appearance: Sandy Tyler is well-developed. Diana Castañeda is obese.    MICHAEL

## 2021-11-05 NOTE — ED INITIAL ASSESSMENT (HPI)
Patient was admitted to [facility] after being in the ED at [facility] for an overdose on Trazodone. She was noted to have HTN when in the in patient psych unit.  She was supposed to follow-up with her PCP today related to the HTN but couldn't get

## 2021-11-09 ENCOUNTER — APPOINTMENT (OUTPATIENT)
Dept: PHYSICAL THERAPY | Age: 21
End: 2021-11-09
Attending: ORTHOPAEDIC SURGERY

## 2021-11-11 ENCOUNTER — APPOINTMENT (OUTPATIENT)
Dept: PHYSICAL THERAPY | Age: 21
End: 2021-11-11
Attending: ORTHOPAEDIC SURGERY

## 2021-11-24 NOTE — CERTIFICATION
Ref: [facility] 5/3-403, 5/3-602, 5/3-607, 5/3-610    5/3-702, 5/3-813, 5/4-306, 5/4-402, 5/4-403    5/4-405, 5/4-501, 5/4-611, 3/9-286   Inpatient Certificate  Re: Wilian Basurto    (name)     I personally informed the above-named individual of the pur behavioral history, to suffer mental or emotional deterioration and is reasonably expected, after such deterioration, to meet the criteria of either paragraph one or paragraph two above;   []  An individual who is developmentally disabled and unless treate

## 2021-11-24 NOTE — ED QUICK NOTES
Vernon from [facility]. Acetaminophen & Salicylate levels added. Case #[id number]. Will call back Poison Control for these results.

## 2021-11-24 NOTE — BH LEVEL OF CARE ASSESSMENT
Crisis Evaluation Assessment    [facility] YOB: 2000   Age 22year old MRN [id number]   Location [facility] Attending Tali Ramon MD      Patient's legal sex: female  Patient identifies as: other  Patie prepared to do anything to end your life? (lifetime): Yes  7.  How long ago did you do any of these?: Within the last three months  Score -  OV: 13 - High Risk   Describe : Pt reports they ingested 4 tablets of 60 mg Latuda as a method to attempt suicide staying asleep. They state they sleep 8 hours and takes naps throughout the day. Pt reports daily anxiety since around age 11. When anxious, pt reports feeling tightening in chest and throat. Pt denies any sxs of visible panic attacks.  Pt reports father has No  Health Concerns r/t Abuse: No  Possible Abuse Reportable to[de-identified] Not appropriate for reporting to authorities    Mental Status Exam:   General Appearance  Characteristics: Appropriate clothing  Eye Contact: Direct  Psychomotor Behavior  Gait/Movement: Oth past; most recent hospitalization was at [facility] October 2021. Risk/Protective Factors  Protective Factors:  \"My significant other\"    Level of Care Recommendations  Consulted with: Dr. Maril Gustafson  Level of Care Recommendation: Inpatient Acu

## 2021-11-24 NOTE — ED NOTES
Faxed over [facility] screening tool to Adrien Schmitz requesting 24 obs period for pt due to overdose.

## 2021-11-24 NOTE — ED NOTES
Pt was read rights. Discussed application for voluntary admission. Pt signed both rights and application for voluntary admission. Discussed plan of care with pt. Referral to [facility] will be made.

## 2021-11-24 NOTE — ED PROVIDER NOTES
32-year-old female who presented overnight after an intentional ingestion with suicidal ideation. Medically cleared overnight. Has been evaluated by Frannie Ayala and will need to be admitted for further treatment. Awaiting acceptance/placement.   She had

## 2021-11-24 NOTE — ED INITIAL ASSESSMENT (HPI)
Pt to ED brought by Mom for intentional overdose of Latuda 60 mg - 4 pills taken all at once at 2100 tonight in a suicide attempt.  No N/V.

## 2021-11-24 NOTE — CONSULTS
[facility]  Psychiatric Consult Note    Starr Orn YOB: 2000   Age/Gender 22year old adult MRN [id number]   Location [facility] Attending Dawood Gill MD    0 PCP Sydnie Head MD     D (attention deficit hyperactivity disorder)    • Anxiety    • Arthritis    • Autism 2020   • Borderline [facility] disorder ([facility]    • Depression    • Obesity    • PTSD (post-traumatic stress disorder)    • Suicidal behavior with attempted self-injury ([facility] 75. baltazar self       Assessment  Diagnosis  Unspecified Mood Disorder    Multidisciplinary Problems  No multi-disciplinary problems found      Medications:   Current Outpatient Medications   Medication Sig Dispense Refill   • hydroCHLOROthiazide 25 MG Oral T

## 2021-11-24 NOTE — PROGRESS NOTES
11/24/21 0557   COVID Exposure Risk Screening   Have you been practicing social distancing? Yes   Have you been wearing a mask when in the community? Yes   Are the people you live with following social distancing and wearing a mask?  Yes  (Lives with mom

## 2021-11-24 NOTE — ED QUICK NOTES
Called poison control (rachelle) case [id number].  They want to observation  For 6 hours repeat ekg after 4 hours

## 2021-11-24 NOTE — ED QUICK NOTES
PT AWAKE AND RESTING ON CART. PT DECLINED BREAKFAST AT THIS TIME. PT STATED IT WAS OKAY TO DISCUSS PLAN OF CARE WITH MOM.

## 2021-11-24 NOTE — ED QUICK NOTES
Called Poison Control & spoke with SIMBA Ruffin, Tylenol & Salicylate levels relayed. Per [facility], Pt's Case is now closed. Case #[id number].

## 2021-11-25 NOTE — ED NOTES
Davonte Corbin states that pt has Autism and they don't typically take pts who have Autism so they will have to review the case on Monday, 11/29/21.

## 2021-11-25 NOTE — ED PROVIDER NOTES
No acute events overnight during my shift. Still awaiting placement and transfer. Endorsed oncoming ER physician. 11/26/21 at 5:59 AM  No acute events overnight during my shift. Still awaiting placement and transfer.   Endorsed to oncoming ER physic

## 2021-11-25 NOTE — ED QUICK NOTES
Coloring activity provided. Chair provided. Pt voice interest in getting out of bed to sit in chair. Sitting at [facility].

## 2021-11-25 NOTE — ED QUICK NOTES
Pt eating and drinking at this time. Nurse at [facility]. Pt has 1:1 sitter. Pt is calm and cooperative.

## 2021-11-25 NOTE — ED QUICK NOTES
PATIENT ESCORTED TO THE BATHROOM. OFFERED PO INTAKE. OFFERS NO NEW COMPLAINTS. RESPIRATIONS EASY/UNLABORED. SKIN W/D.  1:1 SITTER AT BEDSIDE.

## 2021-11-25 NOTE — PROGRESS NOTES
Per Nurse Michelle Rene no need for IS precautions. Pt needs a blocked room due to identifying as non-binary.

## 2021-11-26 NOTE — ED PROVIDER NOTES
Called pt. She has had burning with urination and frequency x1 week.  Also has Swelling around the bandar area, states it's around the entrance to vaginal area.  States she is \"miserable\" and she has no energy for last few days.  Has enlarged lymph nodes to groin, incontinence, going through tx for CA.   Denies fever or chills.  Had blood 1x in urine none since, not cloudy or dark but is aggressively drinking water.   Writer advised OV pt agreeable to this. Only wanted to see a female provider, booked for 3pm with NP, told 245 to leave UA.   Order pended.    Endorsed to me at change of shift. Awaiting inpatient psychiatric placement. Supportive care continues. Patient resting quietly.

## 2021-11-26 NOTE — ED NOTES
Spoke with pt regarding self pay reasonability if she were to transfer to [facility], pt verbalized understanding and stated she understands she would be responsible for cost accrued.

## 2021-11-26 NOTE — ED QUICK NOTES
[facility] Pt resting comdfortably on cart. No acute distress noted or voiced.

## 2021-12-08 ENCOUNTER — OFFICE VISIT (OUTPATIENT)
Dept: ORTHOPEDICS CLINIC | Facility: CLINIC | Age: 21
End: 2021-12-08
Payer: MEDICAID

## 2021-12-08 VITALS — BODY MASS INDEX: 48.82 KG/M2 | HEART RATE: 86 BPM | WEIGHT: 293 LBS | OXYGEN SATURATION: 98 % | HEIGHT: 65 IN

## 2021-12-08 DIAGNOSIS — E66.01 MORBID OBESITY (HCC): ICD-10-CM

## 2021-12-08 DIAGNOSIS — M25.572 BILATERAL ANKLE PAIN, UNSPECIFIED CHRONICITY: Primary | ICD-10-CM

## 2021-12-08 DIAGNOSIS — M25.571 BILATERAL ANKLE PAIN, UNSPECIFIED CHRONICITY: Primary | ICD-10-CM

## 2021-12-08 PROCEDURE — 3008F BODY MASS INDEX DOCD: CPT | Performed by: PHYSICIAN ASSISTANT

## 2021-12-08 PROCEDURE — 99214 OFFICE O/P EST MOD 30 MIN: CPT | Performed by: PHYSICIAN ASSISTANT

## 2021-12-08 RX ORDER — IBUPROFEN 200 MG
200 TABLET ORAL EVERY 6 HOURS PRN
COMMUNITY

## 2021-12-08 NOTE — PROGRESS NOTES
[facility]  [address] Gretel ShiDesmet [phone]       Name: Harry Mooney   MRN: [id number]  Date: 12/8/2021     REASON FOR VISIT: Follow up for bilateral ankle pain.      INTERVAL HIS and dry. No obvious peripheral edema noted. Distal neurovascular exam demonstrates normal perfusion, intact sensation to light touch and full strength.        IMPRESSION: Lalitha Minaya is a 22year old adult who presented for follow up of   bilateral

## 2021-12-09 NOTE — PROGRESS NOTES
4 Eyes Skin Assessment     The patient is being assess for   {Blank single:02224::\"Admission\",\"Transfer to New Unit\",\"Post-Op Surgical\",\"Cath Lab Post-Op\",\"Shift Handoff\"}    I agree that 2 RN's have performed a thorough Head to Toe Skin Assessment on the patient. ALL assessment sites listed below have been assessed. Areas assessed for pressure by both nurses:   []   Head, Face, and Ears   []   Shoulders, Back, and Chest, Abdomen  []   Arms, Elbows, and Hands   []   Coccyx, Sacrum, and Ischium  []   Legs, Feet, and Heels        Skin Assessed Under all Medical Devices by both nurses:  {Medical devices:86636}              All Mepilex Borders were peeled back and area peeked at by both nurses:  {Yes/No:60820}  Please list where Mepilex Borders are located:  ***             **SHARE this note so that the co-signing nurse is able to place an eSignature**    Co-signer eSignature: Electronically signed by Sabiha Haines RN on 12/9/21 at 6:46 AM EST    Does the Patient have Skin Breakdown related to pressure?   {Blank single:09363::\"No\",\"Yes LDA WOUND CARE was Initiated documentation include the Renata-wound, Wound Assessment, Measurements, Dressing Treatment, Drainage, and Color\",\"}     (Insert Photo here***)         Anupam Prevention initiated:  {YES/NO:19732}   Wound Care Orders initiated:  {YES/NO:19732}      [facility] nurse consulted for Pressure Injury (Stage 3,4, Unstageable, DTI, NWPT, Complex wounds)and New or Established Ostomies:  {YES/NO:19732}      Primary Nurse eSignature: {Esignature:[id number]} Discharge Summary  Dx:  Other closed fracture of distal end of right fibula with routine healing, subsequent encounter (Q27.437A)            Authorized # of Visits:  12/15  Next Physician appointment: 2/14/19  Fall Risk: standard         Precautions: n/a jumping jacks. Added SL heel raise to HEP. Discussed the importance of continuation of HEP in order to maintain gains made and continue to progress. Patient agreeable to DC this date and continuing HEP. All patient questions answered.      Goals:    (all to 1/31/2019  Tx#: 8/ Date: 2/7/19  Tx#: 9 Date: 2/11/19  Tx#: 10 Date: 2/13/2019  Tx#: 11 Date: 2/25/2019  Tx#: 12    TherEx TherEx TherEx       Nu-step L6, 5 min Assisted patient in correct donning of brace.   Nu-step L5, 5 min, subj  recumb bike L 4.0 x 5 m raises:   -10 DL  -10 SL ea (ue support)  X 2 ea SL shuttle 2 x10 37# ea, wobble board Slow running man alt with 5s holds x 6 ea Side lunge on bosu x 10 ea   FSU up and over 8\" x 12 R PF raises x 20 Tandem on foam rebounder x 10 ea red weighted ball Side ball x 20      Tandem on foam rebounder x 10 ea red weighted ball -   BOSU stagger stance with trunk rotation/weight shift B x 10         Charges:  Therapeutic Ex 1, NM 2  Total Timed Treatment: 38 min  Total Treatment Time: 38 min

## 2021-12-24 ENCOUNTER — HOSPITAL ENCOUNTER (OUTPATIENT)
Age: 21
Discharge: HOME | End: 2021-12-24
Payer: MEDICAID

## 2021-12-24 VITALS
DIASTOLIC BLOOD PRESSURE: 82 MMHG | HEART RATE: 102 BPM | OXYGEN SATURATION: 99 % | BODY MASS INDEX: 48.82 KG/M2 | WEIGHT: 293 LBS | TEMPERATURE: 98 F | SYSTOLIC BLOOD PRESSURE: 153 MMHG | HEIGHT: 65 IN | RESPIRATION RATE: 18 BRPM

## 2021-12-24 DIAGNOSIS — U07.1 COVID-19 VIRUS DETECTED: ICD-10-CM

## 2021-12-24 DIAGNOSIS — R05.9 COUGH: Primary | ICD-10-CM

## 2021-12-24 PROCEDURE — 87880 STREP A ASSAY W/OPTIC: CPT | Performed by: NURSE PRACTITIONER

## 2021-12-24 PROCEDURE — 99213 OFFICE O/P EST LOW 20 MIN: CPT | Performed by: NURSE PRACTITIONER

## 2021-12-24 PROCEDURE — U0002 COVID-19 LAB TEST NON-CDC: HCPCS | Performed by: NURSE PRACTITIONER

## 2021-12-24 NOTE — ED INITIAL ASSESSMENT (HPI)
Patient states here for CV-19 testing  Patient states exposure to CV-19 positive individual- 7 days ago    Patient states she has a sore throat, non productive cough for one day

## 2021-12-24 NOTE — ED PROVIDER NOTES
Patient Seen in: Immediate [address] [place], [place] [address]way      History   Patient presents with:  Sore Throat  Covid-19 Test    Stated Complaint: sore throat, cough x1 day    Subjective:   22year-old presents to the immediate care for sore throat.   Patient states not ill-appearing. HENT:      Head: Normocephalic. Right Ear: Tympanic membrane and ear canal normal.      Left Ear: Tympanic membrane and ear canal normal.      Nose: No congestion. Cardiovascular:      Rate and Rhythm: Normal rate.    Pulmonary:

## 2022-01-14 ENCOUNTER — TELEPHONE (OUTPATIENT)
Dept: FAMILY MEDICINE CLINIC | Facility: CLINIC | Age: 22
End: 2022-01-14

## 2022-01-14 ENCOUNTER — OFFICE VISIT (OUTPATIENT)
Dept: FAMILY MEDICINE CLINIC | Facility: CLINIC | Age: 22
End: 2022-01-14
Payer: MEDICAID

## 2022-01-14 VITALS
HEIGHT: 65 IN | SYSTOLIC BLOOD PRESSURE: 130 MMHG | DIASTOLIC BLOOD PRESSURE: 86 MMHG | BODY MASS INDEX: 48.82 KG/M2 | RESPIRATION RATE: 18 BRPM | TEMPERATURE: 97 F | OXYGEN SATURATION: 98 % | WEIGHT: 293 LBS | HEART RATE: 107 BPM

## 2022-01-14 DIAGNOSIS — E78.5 HYPERLIPIDEMIA, UNSPECIFIED HYPERLIPIDEMIA TYPE: ICD-10-CM

## 2022-01-14 DIAGNOSIS — E66.01 MORBID OBESITY (HCC): ICD-10-CM

## 2022-01-14 DIAGNOSIS — Z00.00 ANNUAL PHYSICAL EXAM: Primary | ICD-10-CM

## 2022-01-14 DIAGNOSIS — R73.03 PREDIABETES: ICD-10-CM

## 2022-01-14 PROCEDURE — 3075F SYST BP GE 130 - 139MM HG: CPT | Performed by: FAMILY MEDICINE

## 2022-01-14 PROCEDURE — 90471 IMMUNIZATION ADMIN: CPT | Performed by: FAMILY MEDICINE

## 2022-01-14 PROCEDURE — 3079F DIAST BP 80-89 MM HG: CPT | Performed by: FAMILY MEDICINE

## 2022-01-14 PROCEDURE — 99395 PREV VISIT EST AGE 18-39: CPT | Performed by: FAMILY MEDICINE

## 2022-01-14 PROCEDURE — 90686 IIV4 VACC NO PRSV 0.5 ML IM: CPT | Performed by: FAMILY MEDICINE

## 2022-01-14 PROCEDURE — 3008F BODY MASS INDEX DOCD: CPT | Performed by: FAMILY MEDICINE

## 2022-01-14 NOTE — PROGRESS NOTES
HPI:   [place] is a 22year old female who presents for a complete physical exam.  Here with mom. Started on metformin 2 month ago for pre-DM (hemoglobin A1c was 6. 3) while in hospital for mental health. Experience looser stools.   Some increas 12/20/2005      FLUZONE 6 months and older PFS 0.5 ml (66530)                          10/03/2015  12/04/2016  08/29/2017                            09/23/2018  09/14/2019  09/29/2020      HEP B/HIB             01/16/2002 03/13/2002 02/20/2003      IPV taking: Reported on 1/14/2022)     • spironolactone 25 MG Oral Tab Take 25 mg by mouth daily.  (Patient not taking: Reported on 1/14/2022)        Past Medical History:   Diagnosis Date   • ADHD (attention deficit hyperactivity disorder)    • Anxiety    • Ar Wt (!) 376 lb (170.6 kg)   LMP 01/07/2022 (Approximate)   SpO2 98%   BMI 62.57 kg/m²   Body mass index is 62.57 kg/m². GENERAL: well nourished,in no apparent distress. Obese.   SKIN: no rashes  HEENT:  ears and throat are clear  EYES:PERRLA, conjunctiva

## 2022-01-14 NOTE — TELEPHONE ENCOUNTER
Flu vaccine given at office visit today- mom notified per CDC no contraindications.     She v/u  Denies questions or concerns

## 2022-02-14 ENCOUNTER — TELEPHONE (OUTPATIENT)
Dept: FAMILY MEDICINE CLINIC | Facility: CLINIC | Age: 22
End: 2022-02-14

## 2022-02-14 NOTE — TELEPHONE ENCOUNTER
Labs due  [facility] sent  Future Appointments   Date Time Provider Joel Chisholm   2/16/2022  2:30 PM JOSÉ Gomez [facility] TYLER Julio   2/23/2022  3:00 PM JOSÉ Lamb [address]

## 2022-02-22 PROBLEM — F32.9 MAJOR DEPRESSIVE DISORDER: Status: ACTIVE | Noted: 2022-02-22

## 2022-02-22 NOTE — ED QUICK NOTES
Pt awake and alert, skin w/d,resps appear unlabored. EAS here. Pt given additional gown to use as robe without strings. Pt with face mask without metal at this time for transport. Belongings given to medics. Pt still refusing to let RN call family/friend regarding admission. Pt continues to refuse additional drink or food.

## 2022-02-22 NOTE — ED QUICK NOTES
Pt's mother called looking for update but pt does  Not wish for mother to be given any information. Mother made aware.

## 2022-02-22 NOTE — ED NOTES
This writer contacted [address] [place] [address] to make a referral but there wasn't any answer.
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
## 2022-02-22 NOTE — ED QUICK NOTES
Pt awake and alert, skin w/d,resps reg/unlabored. Pt calm and cooperative at this time, made aware ambulance will be here at 4pm for transfer. Sitter remains at bedside.

## 2022-02-22 NOTE — ED QUICK NOTES
Patient got up in cart, started to shout that she \"can't do this anymore. I just want to die. \"  Patient then began to strangle herself with a blanket. Security to bedside.

## 2022-02-22 NOTE — ED QUICK NOTES
Pt now awake. Osmar Roth discussed plan of care with pt. Pt ambulatory to restroom with steady gait accompanied by sitter.

## 2022-02-22 NOTE — ED QUICK NOTES
Report given to [facility] at [facility] who will review with intake regarding admission.

## 2022-02-22 NOTE — ED NOTES
TRANSFER SUMMARY:    Jazmyne January:  This writer contacted crisis/intake line. Faxed packet for review. [facility]:  No available beds all units full since Saturday. SILVER Huslia:  No answer.       JET:  Patient deflected d/t requiring 1:1.

## 2022-02-22 NOTE — ED NOTES
This writer attempted to meet pt to discuss the POC and paperwork. Pt is currently in restraints and shouting. SANFORD will attempt to meet with pt at a later time.

## 2022-02-22 NOTE — ED NOTES
Potential ED boarder status orders placed into patients chart. Dr. Melina Vargas may come to see patients in the ED starting this afternoon.

## 2022-02-22 NOTE — ED NOTES
This writer contacted [facility] to make a referral for patient. This writer spoke with Supriya; all beds full.

## 2022-02-22 NOTE — ED QUICK NOTES
Pt picking at her arms and putting her hands under blankets. Sitter at bedside believes pt has something under the blanket. Requested object be handed over. Pt denies having anything. Security called for assistance. Eventually able to remove sharp metal piece that pt had removed from mask.

## 2022-02-22 NOTE — ED QUICK NOTES
Report received from Christopher Jacome. Awaiting daily meds from pharmacy. Sitter remains at bedside. Pt asleep and in no distress.

## 2022-02-22 NOTE — ED QUICK NOTES
Report given to Andi Deutsch at [facility]. Ok to transfer pt at 4pm and pt accepted by Dr. Adriana Flores and will go to room 807 A.

## 2022-02-22 NOTE — ED PROVIDER NOTES
Patient was signed out medically cleared awaiting placement for suicidal ideation. Shortly after transfer into the pod, patient began screaming and attempted to strangle herself with a blanket. She was not responding to verbal de-escalation. Patient was chemically sedated for her safety.

## 2022-02-22 NOTE — BH PROGRESS NOTE
Dr. Karolina Lemus has accepted pt to [facility] bed 807A. Writer gave SBAR to Zeke Weiss and provided ER staff Kiran Barker the [address] [place] [address] # [id number] for pt's RN, Laya Newberry to call for report and set up transport.

## 2022-02-23 PROBLEM — E73.9 LACTOSE INTOLERANCE: Status: ACTIVE | Noted: 2022-02-23

## 2022-02-23 PROBLEM — F33.2 SEVERE RECURRENT MAJOR DEPRESSION WITHOUT PSYCHOTIC FEATURES (HCC): Status: ACTIVE | Noted: 2021-07-06

## 2022-02-25 PROBLEM — E78.00 HYPERCHOLESTEROLEMIA: Status: ACTIVE | Noted: 2022-02-25

## 2022-03-09 ENCOUNTER — PATIENT MESSAGE (OUTPATIENT)
Dept: FAMILY MEDICINE CLINIC | Facility: CLINIC | Age: 22
End: 2022-03-09

## 2022-03-09 RX ORDER — METFORMIN HYDROCHLORIDE 500 MG/1
1000 TABLET, EXTENDED RELEASE ORAL
Qty: 180 TABLET | Refills: 0 | Status: SHIPPED | OUTPATIENT
Start: 2022-03-09 | End: 2022-03-29

## 2022-03-09 NOTE — TELEPHONE ENCOUNTER
Diabetic Medication Protocol Failed 03/09/2022 08:03 AM   Protocol Details  Microalbumin procedure in past 12 months or taking ACE/ARB    HgBA1C procedure resulted in past 6 months    Last HgBA1C < 7.5    Appointment in past 6 or next 3 months     Last refill 12/2/21 #180 0 refill  Future Appointments   Date Time Provider Joel Chisholm   3/24/2022 11:00 AM JOSÉ Gomez [facility]   4/5/2022  8:00 AM JOSÉ Lamb [facility] [address]   6/1/2022  8:00 AM Kenneth Pablo MD [facility] Jay Treviño

## 2022-03-09 NOTE — TELEPHONE ENCOUNTER
From: [place]  To: Rosalio East MD  Sent: 3/9/2022 7:39 AM CST  Subject: Metformin     This message is being sent by Florinda Ladd on behalf of [place]. Sandy needs to have Metformin refilled and Karen phan let us request it via the raheem. Are you able to refill it for us or should we have her stop taking it?     Cecily Sanchez

## 2022-03-11 NOTE — ED QUICK NOTES
Pt notified of acceptance at [facility], verbalized understanding.  Pt does not want mom notified where she is being transferred to

## 2022-03-11 NOTE — ED INITIAL ASSESSMENT (HPI)
Pt presents to ed ambulatory with steady gait stating she ingested 8 100mg tabs at 1740 and 7 300mg tabs of gabapentin at approx at 1745 this evening in attempt to kill herself.  On arrival pt is c/o fatigue
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
## 2022-03-11 NOTE — ED NOTES
[facility] advised that pt requires NAVJOT eval due to secondary medicaid. Advised that pt has already left, but that this writer will follow up with CARES line to request 24 hour eval to be completed at [facility].      Spoke with [facility] and provided referral, also was advised that [facility] was calling request in at same time so nothing further should be needed at this time

## 2022-03-11 NOTE — ED QUICK NOTES
Mother calling for update on patient now. Told that this RN just received report and was told she did fine overnight. Will be transferred to facility at [place] [id number] but patient is requesting mother not be told which facility at this time. Mother states understanding.

## 2022-03-11 NOTE — ED NOTES
This writer met with Errol Zayas who says she is mad she is going IP because every year at the same time she ends up in the hospital.  This writer asked if something happened during March in previous years. Pt states this time it is anxiety being at home for spring break. This writer encouraged Anne Marie to use her coping skills she has learned in the past and to make a list of things that she can do aside from staying inside/at home during her breaks (ie : Take walks, visit the zoo /museum, have coffee with a friend). For her current anxiety complaints, this writer encouraged four square breathing, journal, positive affirmations, etc.  Rangel Risk all of those and just wants to purge\"  Anne Marie then went on to say she was scared going inpatient again because other patients are mean. This writer assured her each admission and mil-u will be different. This writer encouraged full group and treatment participation before she left w/her transport .  Pt refused DBT worksheets, color sheets and word searches

## 2022-03-11 NOTE — ED NOTES
Transfer Summary:    Packet faxed to [facility] and [address] [place] [address] for review.     Pt requested no [facility] and no [address] [place]

## 2022-03-11 NOTE — ED QUICK NOTES
NAVJOT called at this time for update.  Told that Dr Suzanne Pressley accepted patient at [facility] and transferring at [facility]

## 2022-03-11 NOTE — ED QUICK NOTES
Pt accepted by dr. Rebecca Cochran at [facility], [place], operated by [facility] behavioral, transport to be set up for 10am

## 2022-03-11 NOTE — ED QUICK NOTES
Sitter at bedside notified this rn that patient may have a small metal object in hand, this rn to bedside, pt handed over metal nose bridge from mask given to her in triage, no other contraband found

## 2022-03-11 NOTE — ED NOTES
EDRN made aware pt has secondary medicaid and will require NAVJOT evaluation. Clinician requested EDRN call NAVJOT.

## 2022-03-21 NOTE — TELEPHONE ENCOUNTER
LOV 1/14/22 for an annual exam.    Diabetic Medication Protocol Failed 03/20/2022 11:41 AM   Protocol Details  Microalbumin procedure in past 12 months or taking ACE/ARB    HgBA1C procedure resulted in past 6 months    Last HgBA1C < 7.5    Appointment in past 6 or next 3 months     No recent urine micro. Ok to send?   Future Appointments   Date Time Provider Joel Chisholm   3/24/2022 11:00 AM JOSÉ Alberts [facility]   4/5/2022  8:00 AM JOSÉ Mireles [facility]   6/1/2022  8:00 AM [facility]io Bat[facility]

## 2022-03-22 RX ORDER — METFORMIN HYDROCHLORIDE 500 MG/1
1000 TABLET, EXTENDED RELEASE ORAL
Qty: 180 TABLET | Refills: 0 | OUTPATIENT
Start: 2022-03-22

## 2022-03-22 NOTE — TELEPHONE ENCOUNTER
Sent to pharmacy as: metFORMIN HCl  MG Oral Tablet Extended Release 24 Hour (GLUCOPHAGE-XR)    Notes to Pharmacy: Pl direct refill to patient's PCP,NOT ME. Thank you.don't FAX to my clinic.     E-Prescribing Status: Receipt confirmed by pharmacy (3/9/2022 10:45 PM CST)

## 2022-03-23 ENCOUNTER — HOSPITAL ENCOUNTER (EMERGENCY)
Facility: HOSPITAL | Age: 22
Discharge: NURSING, REHAB OR LONG-TERM CARE | End: 2022-03-24
Attending: STUDENT IN AN ORGANIZED HEALTH CARE EDUCATION/TRAINING PROGRAM
Payer: COMMERCIAL

## 2022-03-23 DIAGNOSIS — F32.A DEPRESSION, UNSPECIFIED DEPRESSION TYPE: ICD-10-CM

## 2022-03-23 DIAGNOSIS — R45.851 SUICIDAL IDEATION: ICD-10-CM

## 2022-03-23 DIAGNOSIS — N39.0 URINARY TRACT INFECTION WITHOUT HEMATURIA, SITE UNSPECIFIED: Primary | ICD-10-CM

## 2022-03-23 LAB
ALBUMIN SERPL-MCNC: 3.7 G/DL (ref 3.4–5)
ALBUMIN/GLOB SERPL: 0.8 {RATIO} (ref 1–2)
ALP LIVER SERPL-CCNC: 75 U/L
ALT SERPL-CCNC: 27 U/L
AMPHET UR QL SCN: NEGATIVE
ANION GAP SERPL CALC-SCNC: 6 MMOL/L (ref 0–18)
APAP SERPL-MCNC: <2 UG/ML (ref 10–30)
AST SERPL-CCNC: 20 U/L (ref 15–37)
B-HCG UR QL: NEGATIVE
BASOPHILS # BLD AUTO: 0.06 X10(3) UL (ref 0–0.2)
BASOPHILS NFR BLD AUTO: 0.6 %
BILIRUB SERPL-MCNC: 0.2 MG/DL (ref 0.1–2)
BUN BLD-MCNC: 11 MG/DL (ref 7–18)
CALCIUM BLD-MCNC: 9.9 MG/DL (ref 8.5–10.1)
CANNABINOIDS UR QL SCN: NEGATIVE
CHLORIDE SERPL-SCNC: 106 MMOL/L (ref 98–112)
CO2 SERPL-SCNC: 23 MMOL/L (ref 21–32)
COCAINE UR QL: NEGATIVE
COLOR UR AUTO: YELLOW
CREAT BLD-MCNC: 0.77 MG/DL
CREAT UR-SCNC: 262 MG/DL
EOSINOPHIL # BLD AUTO: 0.18 X10(3) UL (ref 0–0.7)
EOSINOPHIL NFR BLD AUTO: 1.7 %
ERYTHROCYTE [DISTWIDTH] IN BLOOD BY AUTOMATED COUNT: 14.7 %
ETHANOL SERPL-MCNC: <3 MG/DL (ref ?–3)
GLOBULIN PLAS-MCNC: 4.5 G/DL (ref 2.8–4.4)
GLUCOSE BLD-MCNC: 107 MG/DL (ref 70–99)
GLUCOSE UR STRIP.AUTO-MCNC: NEGATIVE MG/DL
HCT VFR BLD AUTO: 38.4 %
HGB BLD-MCNC: 11.7 G/DL
IMM GRANULOCYTES # BLD AUTO: 0.02 X10(3) UL (ref 0–1)
IMM GRANULOCYTES NFR BLD: 0.2 %
KETONES UR STRIP.AUTO-MCNC: NEGATIVE MG/DL
LYMPHOCYTES # BLD AUTO: 3.2 X10(3) UL (ref 1–4)
LYMPHOCYTES NFR BLD AUTO: 30.8 %
MCH RBC QN AUTO: 23.1 PG (ref 26–34)
MCHC RBC AUTO-ENTMCNC: 30.5 G/DL (ref 31–37)
MCV RBC AUTO: 75.9 FL
MDMA UR QL SCN: NEGATIVE
MONOCYTES # BLD AUTO: 0.59 X10(3) UL (ref 0.1–1)
MONOCYTES NFR BLD AUTO: 5.7 %
NEUTROPHILS # BLD AUTO: 6.35 X10 (3) UL (ref 1.5–7.7)
NEUTROPHILS # BLD AUTO: 6.35 X10(3) UL (ref 1.5–7.7)
NEUTROPHILS NFR BLD AUTO: 61 %
NITRITE UR QL STRIP.AUTO: NEGATIVE
OPIATES UR QL SCN: NEGATIVE
OSMOLALITY SERPL CALC.SUM OF ELEC: 280 MOSM/KG (ref 275–295)
OXYCODONE UR QL SCN: NEGATIVE
PH UR STRIP.AUTO: 5 [PH] (ref 5–8)
PLATELET # BLD AUTO: 348 10(3)UL (ref 150–450)
POTASSIUM SERPL-SCNC: 3.9 MMOL/L (ref 3.5–5.1)
PROT SERPL-MCNC: 8.2 G/DL (ref 6.4–8.2)
PROT UR STRIP.AUTO-MCNC: 30 MG/DL
RBC # BLD AUTO: 5.06 X10(6)UL
RBC UR QL AUTO: NEGATIVE
SALICYLATES SERPL-MCNC: <1.7 MG/DL (ref 2.8–20)
SARS-COV-2 RNA RESP QL NAA+PROBE: NOT DETECTED
SODIUM SERPL-SCNC: 135 MMOL/L (ref 136–145)
SP GR UR STRIP.AUTO: 1.03 (ref 1–1.03)
UROBILINOGEN UR STRIP.AUTO-MCNC: <2 MG/DL
WBC # BLD AUTO: 10.4 X10(3) UL (ref 4–11)

## 2022-03-23 PROCEDURE — 80143 DRUG ASSAY ACETAMINOPHEN: CPT | Performed by: STUDENT IN AN ORGANIZED HEALTH CARE EDUCATION/TRAINING PROGRAM

## 2022-03-23 PROCEDURE — 81025 URINE PREGNANCY TEST: CPT

## 2022-03-23 PROCEDURE — 80307 DRUG TEST PRSMV CHEM ANLYZR: CPT | Performed by: STUDENT IN AN ORGANIZED HEALTH CARE EDUCATION/TRAINING PROGRAM

## 2022-03-23 PROCEDURE — 82077 ASSAY SPEC XCP UR&BREATH IA: CPT | Performed by: STUDENT IN AN ORGANIZED HEALTH CARE EDUCATION/TRAINING PROGRAM

## 2022-03-23 PROCEDURE — 87086 URINE CULTURE/COLONY COUNT: CPT | Performed by: STUDENT IN AN ORGANIZED HEALTH CARE EDUCATION/TRAINING PROGRAM

## 2022-03-23 PROCEDURE — 36415 COLL VENOUS BLD VENIPUNCTURE: CPT

## 2022-03-23 PROCEDURE — 80053 COMPREHEN METABOLIC PANEL: CPT | Performed by: STUDENT IN AN ORGANIZED HEALTH CARE EDUCATION/TRAINING PROGRAM

## 2022-03-23 PROCEDURE — 81001 URINALYSIS AUTO W/SCOPE: CPT | Performed by: STUDENT IN AN ORGANIZED HEALTH CARE EDUCATION/TRAINING PROGRAM

## 2022-03-23 PROCEDURE — 93010 ELECTROCARDIOGRAM REPORT: CPT

## 2022-03-23 PROCEDURE — 84100 ASSAY OF PHOSPHORUS: CPT | Performed by: STUDENT IN AN ORGANIZED HEALTH CARE EDUCATION/TRAINING PROGRAM

## 2022-03-23 PROCEDURE — 84481 FREE ASSAY (FT-3): CPT | Performed by: STUDENT IN AN ORGANIZED HEALTH CARE EDUCATION/TRAINING PROGRAM

## 2022-03-23 PROCEDURE — 83735 ASSAY OF MAGNESIUM: CPT | Performed by: STUDENT IN AN ORGANIZED HEALTH CARE EDUCATION/TRAINING PROGRAM

## 2022-03-23 PROCEDURE — 82150 ASSAY OF AMYLASE: CPT | Performed by: STUDENT IN AN ORGANIZED HEALTH CARE EDUCATION/TRAINING PROGRAM

## 2022-03-23 PROCEDURE — 99285 EMERGENCY DEPT VISIT HI MDM: CPT

## 2022-03-23 PROCEDURE — 84439 ASSAY OF FREE THYROXINE: CPT | Performed by: STUDENT IN AN ORGANIZED HEALTH CARE EDUCATION/TRAINING PROGRAM

## 2022-03-23 PROCEDURE — 80179 DRUG ASSAY SALICYLATE: CPT | Performed by: STUDENT IN AN ORGANIZED HEALTH CARE EDUCATION/TRAINING PROGRAM

## 2022-03-23 PROCEDURE — 85025 COMPLETE CBC W/AUTO DIFF WBC: CPT | Performed by: STUDENT IN AN ORGANIZED HEALTH CARE EDUCATION/TRAINING PROGRAM

## 2022-03-23 PROCEDURE — 84443 ASSAY THYROID STIM HORMONE: CPT | Performed by: STUDENT IN AN ORGANIZED HEALTH CARE EDUCATION/TRAINING PROGRAM

## 2022-03-23 RX ORDER — NITROFURANTOIN 25; 75 MG/1; MG/1
100 CAPSULE ORAL 2 TIMES DAILY
Status: DISCONTINUED | OUTPATIENT
Start: 2022-03-23 | End: 2022-03-24

## 2022-03-24 VITALS
HEART RATE: 106 BPM | TEMPERATURE: 98 F | WEIGHT: 293 LBS | RESPIRATION RATE: 22 BRPM | BODY MASS INDEX: 50.64 KG/M2 | SYSTOLIC BLOOD PRESSURE: 145 MMHG | DIASTOLIC BLOOD PRESSURE: 98 MMHG | HEIGHT: 63.78 IN | OXYGEN SATURATION: 98 %

## 2022-03-24 PROBLEM — F33.2 MDD (MAJOR DEPRESSIVE DISORDER), RECURRENT SEVERE, WITHOUT PSYCHOSIS (HCC): Status: ACTIVE | Noted: 2022-03-24

## 2022-03-24 LAB
AMYLASE SERPL-CCNC: 18 U/L (ref 25–115)
ATRIAL RATE: 90 BPM
MAGNESIUM SERPL-MCNC: 2 MG/DL (ref 1.6–2.6)
P AXIS: -2 DEGREES
P-R INTERVAL: 142 MS
PHOSPHATE SERPL-MCNC: 3.6 MG/DL (ref 2.5–4.9)
Q-T INTERVAL: 374 MS
QRS DURATION: 90 MS
QTC CALCULATION (BEZET): 457 MS
R AXIS: 33 DEGREES
T AXIS: 13 DEGREES
T3FREE SERPL-MCNC: 2.95 PG/ML (ref 2.4–4.2)
T4 FREE SERPL-MCNC: 1 NG/DL (ref 0.8–1.7)
TSI SER-ACNC: 5.97 MIU/ML (ref 0.36–3.74)
VENTRICULAR RATE: 90 BPM

## 2022-03-24 PROCEDURE — 93005 ELECTROCARDIOGRAM TRACING: CPT

## 2022-03-24 RX ORDER — NITROFURANTOIN 25; 75 MG/1; MG/1
100 CAPSULE ORAL 2 TIMES DAILY
Qty: 14 CAPSULE | Refills: 0 | Status: SHIPPED | OUTPATIENT
Start: 2022-03-24 | End: 2022-03-29

## 2022-03-24 NOTE — ED QUICK NOTES
Per [facility] pt has eating disorder.  Labs added per [facility] eating disorder policy

## 2022-03-24 NOTE — ED NOTES
Pt was accepted to [facility]. Accepting physician is Dr. Elyse Severe. Pt will go to [facility] unit. SBAR was given to the nurse on [facility].  The [facility] nurse asks for nurse to nurse be done after 7:00am.

## 2022-03-24 NOTE — ED NOTES
Inpatient admission is recommended. Pt was updated on the POC and signed paperwork. Paperwork was scanned into pt's chart.

## 2022-03-24 NOTE — ED QUICK NOTES
Blind height and weight obtained, orthostatics completed.  While obtaining orthostatics pt noted to have small metal object in hand, pt handed over to this rn metal nose bridge piece from mask
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
## 2022-03-24 NOTE — PROGRESS NOTES
03/24/22 0303   COVID Exposure Risk Screening   Do you have any of the following new or worsening symptoms of COVID-19? None   Have you been diagnosed with COVID-19 within the past 10 days? No   Are you awaiting COVID-19 test results or do you have a COVID-19 test scheduled? No   In the past 10 days, have you been in contact with someone who was confirmed or suspected to have COVID-19?  No

## 2022-04-06 ENCOUNTER — TELEPHONE (OUTPATIENT)
Dept: FAMILY MEDICINE CLINIC | Facility: CLINIC | Age: 22
End: 2022-04-06

## 2022-04-06 NOTE — TELEPHONE ENCOUNTER
Received Medical Records Request from [facility]/ [id number] [facility]    Ph. [phone]  Fax     Address:  [address], [address] [facility], [address] [place] [address]    Sent to [employer]

## 2022-04-08 ENCOUNTER — TELEPHONE (OUTPATIENT)
Dept: FAMILY MEDICINE CLINIC | Facility: CLINIC | Age: 22
End: 2022-04-08

## 2022-04-08 NOTE — TELEPHONE ENCOUNTER
Raissa Sharp from [place] called to inform Dr Nohemi Zavala, that Pt has a mobile crisis screening on 4/6.      Pt reported suicidal ideations with a plan to hang her self and auditory hallucinations    Ph. [phone]

## 2022-04-08 NOTE — TELEPHONE ENCOUNTER
FYI  Patient currently inpatient at [facility] in [place]  Previous resident kia in [facility] - mom is not sure if when she is discharged she will return there.

## 2022-04-11 ENCOUNTER — MED REC SCAN ONLY (OUTPATIENT)
Dept: FAMILY MEDICINE CLINIC | Facility: CLINIC | Age: 22
End: 2022-04-11

## 2022-05-12 ENCOUNTER — TELEPHONE (OUTPATIENT)
Dept: FAMILY MEDICINE CLINIC | Facility: CLINIC | Age: 22
End: 2022-05-12

## 2022-05-12 NOTE — TELEPHONE ENCOUNTER
Spoke with mom. Patient has been in and out of different residential mental health facilities since last week of March. Currently she is inpatient at [facility] in [facility]. Mom spoke with facility this morning. She was not able to speak with the patient due to it was not her allowed phone time but did leave a message for her to call back.  there was not aware of any homicidal ideation.   Mom will call us back with any updates as needed

## 2022-05-12 NOTE — TELEPHONE ENCOUNTER
Pt had a mobile crisis screening on 5/10/22 after reporting suicidal ideations voicing plans to strangle self and also voicing homicidal ideation towards family members. Pt was evaluated Daisy Bryan from [place] did not know where evaluation took place) and pt was released (Lawson Catalan, did not know how long after admittance pt was released) and wassent back to the clinic that she has been residing, [facility].      Ph: [phone]- Direct line to [facility], Care Coordinator for [address] Fax: [phone]

## 2022-06-01 ENCOUNTER — OFFICE VISIT (OUTPATIENT)
Dept: FAMILY MEDICINE CLINIC | Facility: CLINIC | Age: 22
End: 2022-06-01
Payer: COMMERCIAL

## 2022-06-01 VITALS
OXYGEN SATURATION: 98 % | WEIGHT: 293 LBS | HEIGHT: 63 IN | TEMPERATURE: 97 F | DIASTOLIC BLOOD PRESSURE: 88 MMHG | HEART RATE: 107 BPM | RESPIRATION RATE: 18 BRPM | SYSTOLIC BLOOD PRESSURE: 136 MMHG | BODY MASS INDEX: 51.91 KG/M2

## 2022-06-01 DIAGNOSIS — R73.03 PREDIABETES: ICD-10-CM

## 2022-06-01 DIAGNOSIS — E55.9 VITAMIN D DEFICIENCY: ICD-10-CM

## 2022-06-01 DIAGNOSIS — E78.5 HYPERLIPIDEMIA, UNSPECIFIED HYPERLIPIDEMIA TYPE: ICD-10-CM

## 2022-06-01 DIAGNOSIS — E66.01 MORBID OBESITY (HCC): ICD-10-CM

## 2022-06-01 DIAGNOSIS — D50.9 MICROCYTIC ANEMIA: Primary | ICD-10-CM

## 2022-06-01 LAB
ALBUMIN SERPL-MCNC: 3.4 G/DL (ref 3.4–5)
ALBUMIN/GLOB SERPL: 0.9 {RATIO} (ref 1–2)
ALP LIVER SERPL-CCNC: 72 U/L
ALT SERPL-CCNC: 21 U/L
ANION GAP SERPL CALC-SCNC: 9 MMOL/L (ref 0–18)
AST SERPL-CCNC: 15 U/L (ref 15–37)
BASOPHILS # BLD AUTO: 0.05 X10(3) UL (ref 0–0.2)
BASOPHILS NFR BLD AUTO: 0.6 %
BILIRUB SERPL-MCNC: 0.3 MG/DL (ref 0.1–2)
BUN BLD-MCNC: 8 MG/DL (ref 7–18)
CALCIUM BLD-MCNC: 9 MG/DL (ref 8.5–10.1)
CHLORIDE SERPL-SCNC: 106 MMOL/L (ref 98–112)
CHOLEST SERPL-MCNC: 224 MG/DL (ref ?–200)
CO2 SERPL-SCNC: 24 MMOL/L (ref 21–32)
CREAT BLD-MCNC: 0.63 MG/DL
DEPRECATED HBV CORE AB SER IA-ACNC: 19.8 NG/ML
EOSINOPHIL # BLD AUTO: 0.31 X10(3) UL (ref 0–0.7)
EOSINOPHIL NFR BLD AUTO: 3.5 %
ERYTHROCYTE [DISTWIDTH] IN BLOOD BY AUTOMATED COUNT: 18.6 %
EST. AVERAGE GLUCOSE BLD GHB EST-MCNC: 114 MG/DL (ref 68–126)
FASTING PATIENT LIPID ANSWER: YES
FASTING STATUS PATIENT QL REPORTED: YES
GLOBULIN PLAS-MCNC: 3.8 G/DL (ref 2.8–4.4)
GLUCOSE BLD-MCNC: 108 MG/DL (ref 70–99)
HBA1C MFR BLD: 5.6 % (ref ?–5.7)
HCT VFR BLD AUTO: 38.3 %
HDLC SERPL-MCNC: 59 MG/DL (ref 40–59)
HGB BLD-MCNC: 11.7 G/DL
IMM GRANULOCYTES # BLD AUTO: 0.01 X10(3) UL (ref 0–1)
IMM GRANULOCYTES NFR BLD: 0.1 %
LDLC SERPL CALC-MCNC: 136 MG/DL (ref ?–100)
LYMPHOCYTES # BLD AUTO: 2.52 X10(3) UL (ref 1–4)
LYMPHOCYTES NFR BLD AUTO: 28.8 %
MCH RBC QN AUTO: 24.4 PG (ref 26–34)
MCHC RBC AUTO-ENTMCNC: 30.5 G/DL (ref 31–37)
MCV RBC AUTO: 79.8 FL
MONOCYTES # BLD AUTO: 0.41 X10(3) UL (ref 0.1–1)
MONOCYTES NFR BLD AUTO: 4.7 %
NEUTROPHILS # BLD AUTO: 5.46 X10 (3) UL (ref 1.5–7.7)
NEUTROPHILS # BLD AUTO: 5.46 X10(3) UL (ref 1.5–7.7)
NEUTROPHILS NFR BLD AUTO: 62.3 %
NONHDLC SERPL-MCNC: 165 MG/DL (ref ?–130)
OSMOLALITY SERPL CALC.SUM OF ELEC: 287 MOSM/KG (ref 275–295)
PLATELET # BLD AUTO: 317 10(3)UL (ref 150–450)
POTASSIUM SERPL-SCNC: 4 MMOL/L (ref 3.5–5.1)
PROT SERPL-MCNC: 7.2 G/DL (ref 6.4–8.2)
RBC # BLD AUTO: 4.8 X10(6)UL
SODIUM SERPL-SCNC: 139 MMOL/L (ref 136–145)
TRIGL SERPL-MCNC: 163 MG/DL (ref 30–149)
VIT D+METAB SERPL-MCNC: 27.8 NG/ML (ref 30–100)
VLDLC SERPL CALC-MCNC: 30 MG/DL (ref 0–30)
WBC # BLD AUTO: 8.8 X10(3) UL (ref 4–11)

## 2022-06-01 PROCEDURE — 99214 OFFICE O/P EST MOD 30 MIN: CPT | Performed by: FAMILY MEDICINE

## 2022-06-01 PROCEDURE — 3075F SYST BP GE 130 - 139MM HG: CPT | Performed by: FAMILY MEDICINE

## 2022-06-01 PROCEDURE — 3008F BODY MASS INDEX DOCD: CPT | Performed by: FAMILY MEDICINE

## 2022-06-01 PROCEDURE — 3079F DIAST BP 80-89 MM HG: CPT | Performed by: FAMILY MEDICINE

## 2022-06-01 PROCEDURE — 83036 HEMOGLOBIN GLYCOSYLATED A1C: CPT | Performed by: FAMILY MEDICINE

## 2022-06-01 PROCEDURE — 85025 COMPLETE CBC W/AUTO DIFF WBC: CPT | Performed by: FAMILY MEDICINE

## 2022-06-01 PROCEDURE — 82306 VITAMIN D 25 HYDROXY: CPT | Performed by: FAMILY MEDICINE

## 2022-06-01 PROCEDURE — 82728 ASSAY OF FERRITIN: CPT | Performed by: FAMILY MEDICINE

## 2022-06-01 PROCEDURE — 80053 COMPREHEN METABOLIC PANEL: CPT | Performed by: FAMILY MEDICINE

## 2022-06-01 PROCEDURE — 80061 LIPID PANEL: CPT | Performed by: FAMILY MEDICINE

## 2022-06-01 RX ORDER — DESVENLAFAXINE 50 MG/1
TABLET, EXTENDED RELEASE ORAL
COMMUNITY
Start: 2022-04-21

## 2022-06-03 ENCOUNTER — TELEPHONE (OUTPATIENT)
Dept: FAMILY MEDICINE CLINIC | Facility: CLINIC | Age: 22
End: 2022-06-03

## 2022-06-03 RX ORDER — PRAVASTATIN SODIUM 20 MG
20 TABLET ORAL NIGHTLY
Qty: 90 TABLET | Refills: 0 | Status: SHIPPED | OUTPATIENT
Start: 2022-06-03

## 2022-06-03 NOTE — TELEPHONE ENCOUNTER
----- Message from Pasha Rivera MD sent at 6/3/2022  4:01 PM CDT -----  Hemoglobin A1c within normal limits. Continue current dose of metformin. Cholesterol improved but not at goal.  I would like to increase her dose of pravastatin from 10 to 20 mg p.o. nightly. Send new prescription prescription for 90 days. Vitamin D is slightly low at 27. Recommend daily over-the-counter vitamin D 4000 units if not already doing so. Rest of labs look fine.   Repeat lipids in 3 months

## 2022-07-11 ENCOUNTER — TELEPHONE (OUTPATIENT)
Dept: FAMILY MEDICINE CLINIC | Facility: CLINIC | Age: 22
End: 2022-07-11

## 2022-07-11 NOTE — TELEPHONE ENCOUNTER
Fax received from [facility]  DM eye exam on : 07/08/22  Deatra Alpers, DO. No retinopathy noted   Updated on flowsheets. Pt is not a diabetic pt but she has concerns due to father and brother got dx's about the same age, per eye doc reported.

## 2022-07-12 ENCOUNTER — PATIENT MESSAGE (OUTPATIENT)
Dept: FAMILY MEDICINE CLINIC | Facility: CLINIC | Age: 22
End: 2022-07-12

## 2022-07-12 ENCOUNTER — HOSPITAL ENCOUNTER (EMERGENCY)
Facility: HOSPITAL | Age: 22
Discharge: NURSING, REHAB OR LONG-TERM CARE | End: 2022-07-13
Attending: EMERGENCY MEDICINE
Payer: MEDICAID

## 2022-07-12 DIAGNOSIS — F32.A DEPRESSION, UNSPECIFIED DEPRESSION TYPE: Primary | ICD-10-CM

## 2022-07-12 DIAGNOSIS — E78.5 HYPERLIPIDEMIA, UNSPECIFIED HYPERLIPIDEMIA TYPE: Primary | ICD-10-CM

## 2022-07-12 DIAGNOSIS — R45.851 SUICIDAL IDEATION: ICD-10-CM

## 2022-07-12 LAB
ALBUMIN SERPL-MCNC: 3.2 G/DL (ref 3.4–5)
ALBUMIN/GLOB SERPL: 0.7 {RATIO} (ref 1–2)
ALP LIVER SERPL-CCNC: 72 U/L
ALT SERPL-CCNC: 20 U/L
AMPHET UR QL SCN: NEGATIVE
ANION GAP SERPL CALC-SCNC: 5 MMOL/L (ref 0–18)
AST SERPL-CCNC: 30 U/L (ref 15–37)
BASOPHILS # BLD AUTO: 0.05 X10(3) UL (ref 0–0.2)
BASOPHILS NFR BLD AUTO: 0.5 %
BENZODIAZ UR QL SCN: NEGATIVE
BILIRUB SERPL-MCNC: 0.2 MG/DL (ref 0.1–2)
BILIRUB UR QL STRIP.AUTO: NEGATIVE
BUN BLD-MCNC: 11 MG/DL (ref 7–18)
CALCIUM BLD-MCNC: 9.2 MG/DL (ref 8.5–10.1)
CANNABINOIDS UR QL SCN: NEGATIVE
CHLORIDE SERPL-SCNC: 107 MMOL/L (ref 98–112)
CLARITY UR REFRACT.AUTO: CLEAR
CO2 SERPL-SCNC: 22 MMOL/L (ref 21–32)
COCAINE UR QL: NEGATIVE
COLOR UR AUTO: YELLOW
CREAT BLD-MCNC: 0.82 MG/DL
CREAT UR-SCNC: 63.9 MG/DL
EOSINOPHIL # BLD AUTO: 0.16 X10(3) UL (ref 0–0.7)
EOSINOPHIL NFR BLD AUTO: 1.6 %
ERYTHROCYTE [DISTWIDTH] IN BLOOD BY AUTOMATED COUNT: 16.7 %
ETHANOL SERPL-MCNC: <3 MG/DL (ref ?–3)
FLUAV + FLUBV RNA SPEC NAA+PROBE: NEGATIVE
FLUAV + FLUBV RNA SPEC NAA+PROBE: NEGATIVE
GLOBULIN PLAS-MCNC: 4.4 G/DL (ref 2.8–4.4)
GLUCOSE BLD-MCNC: 108 MG/DL (ref 70–99)
GLUCOSE UR STRIP.AUTO-MCNC: NEGATIVE MG/DL
HCT VFR BLD AUTO: 40.5 %
HGB BLD-MCNC: 12.8 G/DL
IMM GRANULOCYTES # BLD AUTO: 0.03 X10(3) UL (ref 0–1)
IMM GRANULOCYTES NFR BLD: 0.3 %
KETONES UR STRIP.AUTO-MCNC: NEGATIVE MG/DL
LYMPHOCYTES # BLD AUTO: 3.37 X10(3) UL (ref 1–4)
LYMPHOCYTES NFR BLD AUTO: 33.4 %
MCH RBC QN AUTO: 25.4 PG (ref 26–34)
MCHC RBC AUTO-ENTMCNC: 31.6 G/DL (ref 31–37)
MCV RBC AUTO: 80.4 FL
MDMA UR QL SCN: NEGATIVE
MONOCYTES # BLD AUTO: 0.46 X10(3) UL (ref 0.1–1)
MONOCYTES NFR BLD AUTO: 4.6 %
NEUTROPHILS # BLD AUTO: 6.03 X10 (3) UL (ref 1.5–7.7)
NEUTROPHILS # BLD AUTO: 6.03 X10(3) UL (ref 1.5–7.7)
NEUTROPHILS NFR BLD AUTO: 59.6 %
NITRITE UR QL STRIP.AUTO: NEGATIVE
OPIATES UR QL SCN: NEGATIVE
OSMOLALITY SERPL CALC.SUM OF ELEC: 278 MOSM/KG (ref 275–295)
OXYCODONE UR QL SCN: NEGATIVE
PH UR STRIP.AUTO: 6 [PH] (ref 5–8)
PLATELET # BLD AUTO: 250 10(3)UL (ref 150–450)
POTASSIUM SERPL-SCNC: 4.2 MMOL/L (ref 3.5–5.1)
PROT SERPL-MCNC: 7.6 G/DL (ref 6.4–8.2)
PROT UR STRIP.AUTO-MCNC: NEGATIVE MG/DL
RBC # BLD AUTO: 5.04 X10(6)UL
RBC UR QL AUTO: NEGATIVE
RSV RNA SPEC NAA+PROBE: NEGATIVE
SARS-COV-2 RNA RESP QL NAA+PROBE: NOT DETECTED
SODIUM SERPL-SCNC: 134 MMOL/L (ref 136–145)
SP GR UR STRIP.AUTO: 1.01 (ref 1–1.03)
UROBILINOGEN UR STRIP.AUTO-MCNC: <2 MG/DL
WBC # BLD AUTO: 10.1 X10(3) UL (ref 4–11)

## 2022-07-12 PROCEDURE — 82077 ASSAY SPEC XCP UR&BREATH IA: CPT | Performed by: EMERGENCY MEDICINE

## 2022-07-12 PROCEDURE — 80307 DRUG TEST PRSMV CHEM ANLYZR: CPT | Performed by: EMERGENCY MEDICINE

## 2022-07-12 PROCEDURE — 87086 URINE CULTURE/COLONY COUNT: CPT | Performed by: EMERGENCY MEDICINE

## 2022-07-12 PROCEDURE — 36415 COLL VENOUS BLD VENIPUNCTURE: CPT

## 2022-07-12 PROCEDURE — 0241U SARS-COV-2/FLU A AND B/RSV BY PCR (GENEXPERT): CPT | Performed by: EMERGENCY MEDICINE

## 2022-07-12 PROCEDURE — 85025 COMPLETE CBC W/AUTO DIFF WBC: CPT | Performed by: EMERGENCY MEDICINE

## 2022-07-12 PROCEDURE — 82077 ASSAY SPEC XCP UR&BREATH IA: CPT

## 2022-07-12 PROCEDURE — 0241U SARS-COV-2/FLU A AND B/RSV BY PCR (GENEXPERT): CPT

## 2022-07-12 PROCEDURE — 80053 COMPREHEN METABOLIC PANEL: CPT | Performed by: EMERGENCY MEDICINE

## 2022-07-12 PROCEDURE — 80179 DRUG ASSAY SALICYLATE: CPT | Performed by: EMERGENCY MEDICINE

## 2022-07-12 PROCEDURE — 81001 URINALYSIS AUTO W/SCOPE: CPT | Performed by: EMERGENCY MEDICINE

## 2022-07-12 PROCEDURE — 81001 URINALYSIS AUTO W/SCOPE: CPT

## 2022-07-12 PROCEDURE — 80053 COMPREHEN METABOLIC PANEL: CPT

## 2022-07-12 PROCEDURE — 99285 EMERGENCY DEPT VISIT HI MDM: CPT

## 2022-07-12 PROCEDURE — 80143 DRUG ASSAY ACETAMINOPHEN: CPT | Performed by: EMERGENCY MEDICINE

## 2022-07-12 PROCEDURE — 85025 COMPLETE CBC W/AUTO DIFF WBC: CPT

## 2022-07-12 PROCEDURE — 80307 DRUG TEST PRSMV CHEM ANLYZR: CPT

## 2022-07-12 NOTE — TELEPHONE ENCOUNTER
[facility]  To: Sharon Adan MD  Sent: 7/12/2022 8:52 AM CDT  Subject: Blood tests    This message is being sent by Jaylon Navarro on behalf of [place]. There are several blood tests that are showing up as being needed. Are these needed asap? Or is there a time frame we need to complete it by? Do they need to be fasting for any of the tests and should we schedule a follow-up visit?

## 2022-07-12 NOTE — TELEPHONE ENCOUNTER
Dr Nicol Sellers ordered labs 1/14/22 a1c, CMP, lipid, CBC-  These were also reordered 6/1/22 and completed.  Old orders canceled-repeat lipid due in 3 mo-order placed

## 2022-07-13 VITALS
TEMPERATURE: 98 F | DIASTOLIC BLOOD PRESSURE: 70 MMHG | BODY MASS INDEX: 65 KG/M2 | HEART RATE: 85 BPM | WEIGHT: 293 LBS | OXYGEN SATURATION: 99 % | SYSTOLIC BLOOD PRESSURE: 119 MMHG | RESPIRATION RATE: 20 BRPM

## 2022-07-13 PROBLEM — F33.2 MDD (MAJOR DEPRESSIVE DISORDER), RECURRENT EPISODE, SEVERE (HCC): Status: ACTIVE | Noted: 2022-07-13

## 2022-07-13 LAB
APAP SERPL-MCNC: <2 UG/ML (ref 10–30)
GLUCOSE BLD-MCNC: 101 MG/DL (ref 70–99)
SALICYLATES SERPL-MCNC: <1.7 MG/DL (ref 2.8–20)

## 2022-07-13 PROCEDURE — 82962 GLUCOSE BLOOD TEST: CPT

## 2022-07-13 RX ORDER — ERGOCALCIFEROL (VITAMIN D2) 200 MCG/ML
4000 DROPS ORAL DAILY
Status: DISCONTINUED | OUTPATIENT
Start: 2022-07-13 | End: 2022-07-13

## 2022-07-13 RX ORDER — DESVENLAFAXINE 50 MG/1
50 TABLET, EXTENDED RELEASE ORAL DAILY
Status: DISCONTINUED | OUTPATIENT
Start: 2022-07-13 | End: 2022-07-13

## 2022-07-13 RX ORDER — CETIRIZINE HYDROCHLORIDE 10 MG/1
10 TABLET ORAL DAILY
Status: DISCONTINUED | OUTPATIENT
Start: 2022-07-13 | End: 2022-07-13

## 2022-07-13 RX ORDER — FERROUS SULFATE 7.5 MG/0.5
65 SYRINGE (EA) ORAL DAILY
Status: DISCONTINUED | OUTPATIENT
Start: 2022-07-13 | End: 2022-07-13

## 2022-07-13 RX ORDER — PRAVASTATIN SODIUM 20 MG
20 TABLET ORAL NIGHTLY
Status: DISCONTINUED | OUTPATIENT
Start: 2022-07-13 | End: 2022-07-13

## 2022-07-13 RX ORDER — GABAPENTIN 300 MG/1
300 CAPSULE ORAL NIGHTLY
Status: DISCONTINUED | OUTPATIENT
Start: 2022-07-13 | End: 2022-07-13

## 2022-07-13 NOTE — ED QUICK NOTES
Report received. Pt resting on cart with one on one sitter at bedside for safety. Awaiting acceptance at [facility]. Pt remains calm and cooperative with staff.

## 2022-07-13 NOTE — PROGRESS NOTES
07/13/22 0044   COVID Exposure Risk Screening   Do you have any of the following new or worsening symptoms of COVID-19? None   Have you been diagnosed with COVID-19 within the past 10 days? No   Are you awaiting COVID-19 test results or do you have a COVID-19 test scheduled? No   In the past 10 days, have you been in contact with someone who was confirmed or suspected to have COVID-19?  No

## 2022-07-13 NOTE — ED QUICK NOTES
Pt accepted at [facility] at this time. Awaiting for [facility]  to provide room number and phone number for RN to RN.

## 2022-07-13 NOTE — ED NOTES
Pt was assessed and recommended inpt admission. [facility] will review for bed availability later today, 7/13/22. Pt was updated on the POC. Pt signed paperwork and received copies. Pt requests not to be transferred to [facility] or [facility] if [facility] doesn't have beds.

## 2022-07-13 NOTE — ED QUICK NOTES
Meal tray offered to patient, he denied at this time.  Aware to let staff know when he would like to order meal tray

## 2022-07-13 NOTE — ED QUICK NOTES
VICKY GODOY E.T.A. IS 60-90 MINUTES. I ASKED VICKY GODOY TO CALL AROUND FOR A FASTER E.T.A.  AND CALL ME BACK WITH THE TIME

## 2022-07-13 NOTE — ED PROVIDER NOTES
32-year-old female presented overnight with suicidal ideation. Medically cleared. Evaluated by alesia who recommended psychiatric admission. Awaiting placement. No issues.

## 2022-07-13 NOTE — ED INITIAL ASSESSMENT (HPI)
Pt arrives with suicidal thoughts that started tonight. Pt has a plan to overdose on Ibuprofen. Pt denies drugs or alcohol ingestion or self injury.

## 2022-07-14 PROBLEM — F33.2 SEVERE RECURRENT MAJOR DEPRESSION WITHOUT PSYCHOTIC FEATURES (HCC): Status: RESOLVED | Noted: 2021-07-06 | Resolved: 2022-07-14

## 2022-07-14 PROBLEM — F33.2 MDD (MAJOR DEPRESSIVE DISORDER), RECURRENT EPISODE, SEVERE (HCC): Status: RESOLVED | Noted: 2022-07-13 | Resolved: 2022-07-14

## 2022-07-14 PROBLEM — F33.2 RECURRENT MAJOR DEPRESSION-SEVERE (HCC): Status: RESOLVED | Noted: 2021-06-14 | Resolved: 2022-07-14

## 2022-07-14 PROBLEM — F32.9 MAJOR DEPRESSIVE DISORDER: Status: RESOLVED | Noted: 2022-02-22 | Resolved: 2022-07-14

## 2022-08-05 PROBLEM — F33.2 MDD (MAJOR DEPRESSIVE DISORDER), RECURRENT EPISODE, SEVERE (HCC): Status: ACTIVE | Noted: 2022-08-05

## 2022-08-12 RX ORDER — METFORMIN HYDROCHLORIDE 500 MG/1
500 TABLET, EXTENDED RELEASE ORAL 2 TIMES DAILY WITH MEALS
Qty: 180 TABLET | Refills: 0 | Status: SHIPPED | OUTPATIENT
Start: 2022-08-12

## 2022-08-12 NOTE — TELEPHONE ENCOUNTER
Rx refill request     metFORMIN  MG Oral Tablet 24 Hr     [id number] - [address], [facility] [address], [phone]

## 2022-08-28 PROBLEM — D50.9 IRON DEFICIENCY ANEMIA: Status: ACTIVE | Noted: 2021-06-15

## 2022-09-01 ENCOUNTER — TELEPHONE (OUTPATIENT)
Dept: FAMILY MEDICINE CLINIC | Facility: CLINIC | Age: 22
End: 2022-09-01

## 2022-09-07 RX ORDER — PRAVASTATIN SODIUM 20 MG
TABLET ORAL
Qty: 90 TABLET | Refills: 0 | Status: SHIPPED | OUTPATIENT
Start: 2022-09-07

## 2022-09-07 NOTE — TELEPHONE ENCOUNTER
LOV 6/1/22 for a HFU. Cholesterol Medication Protocol Passed 09/06/2022 10:58 PM   Protocol Details  ALT < 80    ALT resulted within past year    Lipid panel within past 12 months    Appointment within past 12 or next 3 months     Future Appointments   Date Time Provider Joel Chisholm   9/8/2022 12:30 PM JOSÉ Thurston [facility] [place]   9/12/2022  8:00 AM Ollie Primrose, APRN [facility] [address]   9/22/2022 11:00 AM JOSÉ ThurstonMGPLFD [facility] [place]   10/6/2022 11:00 AM JOSÉ Thurston [facility] [place]        Rx sent.

## 2022-09-12 ENCOUNTER — OFFICE VISIT (OUTPATIENT)
Dept: INTERNAL MEDICINE CLINIC | Facility: CLINIC | Age: 22
End: 2022-09-12
Payer: COMMERCIAL

## 2022-09-12 VITALS
SYSTOLIC BLOOD PRESSURE: 120 MMHG | HEIGHT: 65 IN | WEIGHT: 293 LBS | DIASTOLIC BLOOD PRESSURE: 82 MMHG | HEART RATE: 74 BPM | BODY MASS INDEX: 48.82 KG/M2 | RESPIRATION RATE: 16 BRPM

## 2022-09-12 DIAGNOSIS — E55.9 VITAMIN D DEFICIENCY: ICD-10-CM

## 2022-09-12 DIAGNOSIS — Z83.3 FAMILY HISTORY OF DIABETES MELLITUS IN MOTHER: ICD-10-CM

## 2022-09-12 DIAGNOSIS — F60.3 BORDERLINE PERSONALITY DISORDER (HCC): ICD-10-CM

## 2022-09-12 DIAGNOSIS — E78.00 HYPERCHOLESTEROLEMIA: ICD-10-CM

## 2022-09-12 DIAGNOSIS — F33.2 MDD (MAJOR DEPRESSIVE DISORDER), RECURRENT SEVERE, WITHOUT PSYCHOSIS (HCC): ICD-10-CM

## 2022-09-12 DIAGNOSIS — E66.01 OBESITY, MORBID, BMI 50 OR HIGHER (HCC): ICD-10-CM

## 2022-09-12 DIAGNOSIS — R73.03 PREDIABETES: ICD-10-CM

## 2022-09-12 DIAGNOSIS — F43.10 PTSD (POST-TRAUMATIC STRESS DISORDER): ICD-10-CM

## 2022-09-12 DIAGNOSIS — Z51.81 THERAPEUTIC DRUG MONITORING: Primary | ICD-10-CM

## 2022-09-12 DIAGNOSIS — E78.00 ELEVATED LDL CHOLESTEROL LEVEL: ICD-10-CM

## 2022-09-12 PROCEDURE — 99204 OFFICE O/P NEW MOD 45 MIN: CPT | Performed by: NURSE PRACTITIONER

## 2022-09-12 PROCEDURE — 3074F SYST BP LT 130 MM HG: CPT | Performed by: NURSE PRACTITIONER

## 2022-09-12 PROCEDURE — 3008F BODY MASS INDEX DOCD: CPT | Performed by: NURSE PRACTITIONER

## 2022-09-12 PROCEDURE — 3079F DIAST BP 80-89 MM HG: CPT | Performed by: NURSE PRACTITIONER

## 2022-09-12 NOTE — PATIENT INSTRUCTIONS
We are here to support you with weight loss, but please remember that you still need your primary care provider for your routine health maintenance. PLAN:  Can think about possibly trying rybelsus- oral/ daily (GLP-1 medication)   Try out some pre-made meal options: saba craven MD, metabolic meals, Factor 75, Freshly   Frozen meals from grocery- eating well or lean cuisine   Follow up with me in 6 weeks  Schedule follow up appointments: Niurka Lawrence (dietitian)  Check for insurance coverage for dietitian and labwork prior to scheduling appointment. Please try to work on the following dietary changes:  1. Goals: Aim for 20-30 grams of protein/ meal  i. Aim for 170 grams of carbohydrates/day  ii. Eat 4-6 vegetables/day  iii. Avoid skipping meals- eat every 4-5 hours  iv. Aim for 3 meals/day  2. Drink lots of water and cut down on soda/juice consumption if soda/juice drinker  3. Focus on protein: (15-30 grams with each meal) ie. greek yogurt, cottage cheese, string cheese, hard boiled eggs  4. Healthy snacks: peanut butter and apples, hummus and carrots, berries, nuts (1/4 cup), tuna and crackers                 Protein Shakes: Premier protein or Core Power                Protein Bars: Rx Bars, Oatmega, Power Crunch                 Sargento balanced breaks (cheese and nuts)- without chocolate  5. Reduce carbohydrates which includes sweets as well as rice, pasta, potatoes, bread, corn and instead choose whole grain options or more protein or vegetables (4-6 servings of vegetables per day)  6. Get a good night of sleep  7. Try to decrease stress in life     Please download apps:  1. \"My Fitness Pal\" (other option is Lose it)) to help you to monitor daily dietary intake and you will be able to see if you are eating the right amount of calories, protein, carbs                With My Fitness Pal-->When you set-up the raheem or need to adjust settings:                Goals should include:                  Lose 1.5-2 lbs per week                Activity level: not very active (can't count exercise towards calorie number per day)                   ** Daily INPUT> Look at nutrition section-- \"nutrients\" and it will break down your macros for the day (ie. Protein, carbs, fibers, sugars and fats). Try to stay within these numbers daily     2. \"7 minute workout\" to help with exercise/activity which takes 7 minutes of your day and that you can do at home! 3. \"Calm\" or \"Headspace\" which helps with mindfulness, meditation, clarity, sleep, and sal to your daily life. 4. [employer] blog for healthy recipe ideas  5. [employer] for low carb resources    HIGH PROTEIN SNACK IDEAS  -cottage cheese  -plain yogurt  -kefir  -hard-boiled eggs  -natural cheeses  -nuts (measure portion size)   -unsweetened nut butters  -dried edamame   -josé miguel seeds soaked in water or almond milk  -soy nuts  -cured meats (monitor for sodium issues)   -hummus with vegetables  -bean dip with vegetables     FRUIT  Low carb fruit options   Raspberries: Half a cup (60 grams) contains 3 grams of carbs. Blackberries: Half a cup (70 grams) contains 4 grams of carbs. Strawberries: Half a cup (100 grams) contains 6 grams of carbs. Blueberries: Half a cup (50 grams) contains 6 grams of carbs. Plum: One medium-sized (80 grams) contains 6 grams of carbs.      VEGETABLES  Low carb vegetables

## 2022-09-16 ENCOUNTER — APPOINTMENT (OUTPATIENT)
Dept: GENERAL RADIOLOGY | Facility: HOSPITAL | Age: 22
End: 2022-09-16
Attending: EMERGENCY MEDICINE

## 2022-09-16 PROCEDURE — 73610 X-RAY EXAM OF ANKLE: CPT | Performed by: EMERGENCY MEDICINE

## 2022-09-16 PROCEDURE — 73562 X-RAY EXAM OF KNEE 3: CPT | Performed by: EMERGENCY MEDICINE

## 2022-09-16 PROCEDURE — 73560 X-RAY EXAM OF KNEE 1 OR 2: CPT | Performed by: EMERGENCY MEDICINE

## 2022-09-16 NOTE — ED QUICK NOTES
Report called to [place], [facility], at [phone] at [facility]. Patient going to room 826A and accepted per Dr. Manjula Das.

## 2022-09-16 NOTE — PROGRESS NOTES
Updated poison control on patient status. Sravani De Los Santos from poison control closed the case. [facility] will be notified for assessment.

## 2022-09-16 NOTE — ED QUICK NOTES
Poison [employer]. Case #[id number]. Will call back when remaining labs are resulted.

## 2022-09-16 NOTE — ED QUICK NOTES
[facility] called at [phone] for transport to [facility]. ETA of within the hour.

## 2022-09-16 NOTE — ED QUICK NOTES
Patient is under review at [facility]. Medical clearance orders for eating disorder treatment were placed. Update with Poison Control at 0530 this morning. Further information about placement should be provided after. Patient is resting comfortably in bed at this time. They have been informed about current treatment plan.

## 2022-09-16 NOTE — ED NOTES
This writer checked in on Joselito who was resting comfortably on her cart. Joselito remains calm and cooperative and requested a mystery book from this writer, which was provided. This writer will continue to check in with Anne Marie.

## 2022-09-16 NOTE — ED QUICK NOTES
Pt told RN that while she was in the Peds ER, before this RN took over care, while talking to [facility] she fell on her left leg. Pt denies hitting her head/ LOC. Pt states that she is having pain to her left ankle and her left knee. Dr. Monie Nielson made away. xrays and pain medication ordered.

## 2022-09-16 NOTE — ED NOTES
[facility] received a callback from Dr. Venice Hester. She agreed to accept this pt for IP treatment at [facility].

## 2022-09-16 NOTE — ED PROVIDER NOTES
Patient was get up to go to the bathroom and lost balance and fell is complain of some pain in the left knee left ankle. The knee there is mild tenderness of the infrapatellar region the patient can do a straight leg raise without difficulty and full range of motion ligaments appear to be grossly intact. There is no swelling or deformity. Left ankle mild tenderness laterally no swelling or deformity neurovascular is fully intact. XR ANKLE (MIN 3 VIEWS), LEFT (CPT=73610)    Result Date: 9/16/2022  CONCLUSION:  No fracture or significant joint space narrowing. Mild soft tissue swelling. Dictated by (CST): Leti Barahona MD on 9/16/2022 at 11:47 AM     Finalized by (CST): Leti Barahona MD on 9/16/2022 at 11:47 AM       XR KNEE (1 OR 2 VIEWS), LEFT (CPT=73560)    Result Date: 9/16/2022  CONCLUSION:  No fracture. No joint space narrowing. Tiny medial osteophyte involving the distal left femoral condyle. Small left knee joint effusion. Dictated by (CST): Leti Barahona MD on 9/16/2022 at 11:45 AM     Finalized by (CST): Leti Barahona MD on 9/16/2022 at 11:46 AM     Images reviewed agree with report patient is medically stable for transfer.

## 2022-09-16 NOTE — ED NOTES
This writer met with Anne Marie and introduced self/role. Taryn Motley was in the mulligan resting on a cart by the nurses station. Taryn Motley states she wants nothing, but feels like she is being judged. This writer assured her nobody is judging her, we just want to see her get the help she needs. Taryn Motley says she asked for a prn and was ignored and states she fell on the Peds unit when [facility] came and nobody cared. States she is unable to move her ankle and her \"whole body hurts\" from the fall. This writer informed RN and tech about ankle and wanting PRN. Madhu Caballero refused DBT worksheets  And all leisure activities this writer had to offer.

## 2022-09-16 NOTE — ED INITIAL ASSESSMENT (HPI)
Pt took 5 tablets of Clonidine in an attempt to harm self. Taken at 2130. Denies other drugs or alcohol. No cutting.
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
## 2022-09-16 NOTE — ED NOTES
Pt was assessed and recommended inpt admission. [facility] will review for bed availability after pt is cleared by poison control. Pt was updated on the POC. Pt signed paperwork and received copies. Pt requests to not be transferred to [facility], [address] [place] [address], or [facility] if they have to be transferred out.

## 2022-09-16 NOTE — PROGRESS NOTES
09/16/22 0144   COVID Exposure Risk Screening   Do you have any of the following new or worsening symptoms of COVID-19? None   Have you been diagnosed with COVID-19 within the past 10 days? No   Are you awaiting COVID-19 test results or do you have a COVID-19 test scheduled? No   In the past 10 days, have you been in contact with someone who was confirmed or suspected to have COVID-19?  No

## 2022-09-16 NOTE — ED QUICK NOTES
Called [facility] for assessment. Pt needs to be cleared by poison control before assessment takes place. Will call when poison control closes the case.

## 2022-09-20 ENCOUNTER — APPOINTMENT (OUTPATIENT)
Dept: GENERAL RADIOLOGY | Facility: HOSPITAL | Age: 22
End: 2022-09-20
Attending: EMERGENCY MEDICINE

## 2022-09-20 ENCOUNTER — HOSPITAL ENCOUNTER (EMERGENCY)
Facility: HOSPITAL | Age: 22
Discharge: HOME | End: 2022-09-21
Attending: EMERGENCY MEDICINE

## 2022-09-20 DIAGNOSIS — T71.164A HANGING, INITIAL ENCOUNTER: Primary | ICD-10-CM

## 2022-09-20 DIAGNOSIS — T14.91XA SUICIDAL BEHAVIOR WITH ATTEMPTED SELF-INJURY (HCC): ICD-10-CM

## 2022-09-20 PROCEDURE — 99285 EMERGENCY DEPT VISIT HI MDM: CPT | Performed by: EMERGENCY MEDICINE

## 2022-09-20 PROCEDURE — 72050 X-RAY EXAM NECK SPINE 4/5VWS: CPT | Performed by: EMERGENCY MEDICINE

## 2022-09-21 VITALS
HEART RATE: 88 BPM | TEMPERATURE: 98 F | RESPIRATION RATE: 20 BRPM | DIASTOLIC BLOOD PRESSURE: 68 MMHG | OXYGEN SATURATION: 96 % | SYSTOLIC BLOOD PRESSURE: 106 MMHG

## 2022-09-21 PROBLEM — F32.9 MAJOR DEPRESSIVE DISORDER: Status: ACTIVE | Noted: 2022-09-21
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
## 2022-10-03 NOTE — TELEPHONE ENCOUNTER
Gynecology Medication Protocol Failed 10/02/2022 10:49 AM    PASS-PENDING LAST PAP WNL--VIA MANUAL LOOKUP    Physical or Pelvic/Breast in past 12 or next 3 mos--VIA MANUAL LOOKUP     Last OV 6/1/22  Last refill 9/20/21 28 11 refill

## 2022-10-04 RX ORDER — NORGESTIMATE AND ETHINYL ESTRADIOL
KIT
Qty: 28 TABLET | Refills: 2 | Status: SHIPPED | OUTPATIENT
Start: 2022-10-04

## 2022-10-18 PROCEDURE — 99284 EMERGENCY DEPT VISIT MOD MDM: CPT

## 2022-10-18 PROCEDURE — 36415 COLL VENOUS BLD VENIPUNCTURE: CPT

## 2022-10-18 PROCEDURE — 93010 ELECTROCARDIOGRAM REPORT: CPT

## 2022-10-18 PROCEDURE — 93005 ELECTROCARDIOGRAM TRACING: CPT

## 2022-10-19 ENCOUNTER — APPOINTMENT (OUTPATIENT)
Dept: GENERAL RADIOLOGY | Facility: HOSPITAL | Age: 22
End: 2022-10-19
Attending: EMERGENCY MEDICINE
Payer: COMMERCIAL

## 2022-10-19 ENCOUNTER — HOSPITAL ENCOUNTER (EMERGENCY)
Facility: HOSPITAL | Age: 22
Discharge: HOME | End: 2022-10-19
Attending: EMERGENCY MEDICINE
Payer: COMMERCIAL

## 2022-10-19 VITALS
OXYGEN SATURATION: 99 % | WEIGHT: 293 LBS | SYSTOLIC BLOOD PRESSURE: 154 MMHG | TEMPERATURE: 97 F | DIASTOLIC BLOOD PRESSURE: 91 MMHG | HEIGHT: 64 IN | HEART RATE: 89 BPM | BODY MASS INDEX: 50.02 KG/M2 | RESPIRATION RATE: 20 BRPM

## 2022-10-19 DIAGNOSIS — R07.89 CHEST PAIN, ATYPICAL: Primary | ICD-10-CM

## 2022-10-19 LAB
ALBUMIN SERPL-MCNC: 3.2 G/DL (ref 3.4–5)
ALBUMIN/GLOB SERPL: 0.7 {RATIO} (ref 1–2)
ALP LIVER SERPL-CCNC: 67 U/L
ALT SERPL-CCNC: 34 U/L
ANION GAP SERPL CALC-SCNC: 7 MMOL/L (ref 0–18)
AST SERPL-CCNC: 24 U/L (ref 15–37)
ATRIAL RATE: 110 BPM
BASOPHILS # BLD AUTO: 0.05 X10(3) UL (ref 0–0.2)
BASOPHILS NFR BLD AUTO: 0.5 %
BILIRUB SERPL-MCNC: 0.2 MG/DL (ref 0.1–2)
BUN BLD-MCNC: 6 MG/DL (ref 7–18)
CALCIUM BLD-MCNC: 9.4 MG/DL (ref 8.5–10.1)
CHLORIDE SERPL-SCNC: 104 MMOL/L (ref 98–112)
CO2 SERPL-SCNC: 26 MMOL/L (ref 21–32)
CREAT BLD-MCNC: 0.71 MG/DL
EOSINOPHIL # BLD AUTO: 0.41 X10(3) UL (ref 0–0.7)
EOSINOPHIL NFR BLD AUTO: 3.8 %
ERYTHROCYTE [DISTWIDTH] IN BLOOD BY AUTOMATED COUNT: 15 %
GFR SERPLBLD BASED ON 1.73 SQ M-ARVRAT: 125 ML/MIN/1.73M2 (ref 60–?)
GLOBULIN PLAS-MCNC: 4.3 G/DL (ref 2.8–4.4)
GLUCOSE BLD-MCNC: 105 MG/DL (ref 70–99)
HCT VFR BLD AUTO: 39.3 %
HGB BLD-MCNC: 12.7 G/DL
IMM GRANULOCYTES # BLD AUTO: 0.04 X10(3) UL (ref 0–1)
IMM GRANULOCYTES NFR BLD: 0.4 %
LYMPHOCYTES # BLD AUTO: 3.36 X10(3) UL (ref 1–4)
LYMPHOCYTES NFR BLD AUTO: 31.1 %
MCH RBC QN AUTO: 27.1 PG (ref 26–34)
MCHC RBC AUTO-ENTMCNC: 32.3 G/DL (ref 31–37)
MCV RBC AUTO: 83.8 FL
MONOCYTES # BLD AUTO: 0.48 X10(3) UL (ref 0.1–1)
MONOCYTES NFR BLD AUTO: 4.4 %
NEUTROPHILS # BLD AUTO: 6.45 X10 (3) UL (ref 1.5–7.7)
NEUTROPHILS # BLD AUTO: 6.45 X10(3) UL (ref 1.5–7.7)
NEUTROPHILS NFR BLD AUTO: 59.8 %
OSMOLALITY SERPL CALC.SUM OF ELEC: 282 MOSM/KG (ref 275–295)
P AXIS: 75 DEGREES
P-R INTERVAL: 152 MS
PLATELET # BLD AUTO: 309 10(3)UL (ref 150–450)
POTASSIUM SERPL-SCNC: 3.9 MMOL/L (ref 3.5–5.1)
PROT SERPL-MCNC: 7.5 G/DL (ref 6.4–8.2)
Q-T INTERVAL: 356 MS
QRS DURATION: 86 MS
QTC CALCULATION (BEZET): 481 MS
R AXIS: 51 DEGREES
RBC # BLD AUTO: 4.69 X10(6)UL
SARS-COV-2 RNA RESP QL NAA+PROBE: NOT DETECTED
SODIUM SERPL-SCNC: 137 MMOL/L (ref 136–145)
T AXIS: 27 DEGREES
TROPONIN I HIGH SENSITIVITY: 4 NG/L
VENTRICULAR RATE: 110 BPM
WBC # BLD AUTO: 10.8 X10(3) UL (ref 4–11)

## 2022-10-19 PROCEDURE — 85025 COMPLETE CBC W/AUTO DIFF WBC: CPT | Performed by: EMERGENCY MEDICINE

## 2022-10-19 PROCEDURE — 71045 X-RAY EXAM CHEST 1 VIEW: CPT | Performed by: EMERGENCY MEDICINE

## 2022-10-19 PROCEDURE — 84484 ASSAY OF TROPONIN QUANT: CPT | Performed by: EMERGENCY MEDICINE

## 2022-10-19 PROCEDURE — 80053 COMPREHEN METABOLIC PANEL: CPT | Performed by: EMERGENCY MEDICINE

## 2022-10-20 ENCOUNTER — TELEPHONE (OUTPATIENT)
Dept: FAMILY MEDICINE CLINIC | Facility: CLINIC | Age: 22
End: 2022-10-20

## 2022-10-20 NOTE — TELEPHONE ENCOUNTER
Pt scheduled a mychart appt on 11/28 for High Blood Pressure and High Heart rate,      Sending to nurse to triage.

## 2022-10-20 NOTE — TELEPHONE ENCOUNTER
In ER on Wednesday for chest pain/SOB  BP was 167/98  HR 130s  Lightheaded and dizzy at times  They advised to f/u with PCP  EKG, CXR, bloodwork normal in ER  No chest pain and SOB at this time  Has cuff and pulse ox at home-her psych told her to monitor this daily  Advised will send to Dr Aris Canales to see if wants to see her sooner  Advised if chest pain or SOB returns go to ER. Verbalized understanding.        Future Appointments   Date Time Provider Joel Chisholm   10/25/2022  9:00 AM Amaury Orozco [facility]   11/3/2022 12:00 PM Amaury Orozco [facility]   11/10/2022  1:30 PM Amaury Orozco [facility]   11/17/2022  9:00 AM Amaury Orozco [facility]   11/23/2022  1:30 PM Amaury Orozco [facility]   11/28/2022  4:15 PM Meliza Quesada MD [facility] [place] [facility]

## 2022-10-21 NOTE — TELEPHONE ENCOUNTER
I believe she has a cardiologist.  Please verify. If she does not have one, refer her to Dr. La Nena Rankin.   If she is unable to get in with cardiology in the next few weeks I will see her

## 2022-10-21 NOTE — TELEPHONE ENCOUNTER
Patient advised and verbalized understanding. Patient states she does not have a cardiologist  Contact info for Dr. Madrid Clear given to patient  Patient will call us if appointment is far out.

## 2022-11-20 NOTE — ED NOTES
[id number] TO HAVE PT ARRIVE AT 1130 AM DUE TO INTAKE ISSUES ON THERE END. This was a shared visit with the BUD. I reviewed and verified the documentation and independently performed the documented:

## 2022-11-28 ENCOUNTER — OFFICE VISIT (OUTPATIENT)
Dept: FAMILY MEDICINE CLINIC | Facility: CLINIC | Age: 22
End: 2022-11-28
Payer: COMMERCIAL

## 2022-11-28 VITALS
RESPIRATION RATE: 187 BRPM | WEIGHT: 293 LBS | HEART RATE: 96 BPM | BODY MASS INDEX: 50.02 KG/M2 | DIASTOLIC BLOOD PRESSURE: 84 MMHG | SYSTOLIC BLOOD PRESSURE: 130 MMHG | TEMPERATURE: 97 F | HEIGHT: 64 IN | OXYGEN SATURATION: 98 %

## 2022-11-28 DIAGNOSIS — E66.01 MORBID OBESITY (HCC): ICD-10-CM

## 2022-11-28 DIAGNOSIS — E78.5 HYPERLIPIDEMIA, UNSPECIFIED HYPERLIPIDEMIA TYPE: ICD-10-CM

## 2022-11-28 DIAGNOSIS — Z51.81 THERAPEUTIC DRUG MONITORING: ICD-10-CM

## 2022-11-28 DIAGNOSIS — I10 ESSENTIAL HYPERTENSION: ICD-10-CM

## 2022-11-28 PROCEDURE — 99214 OFFICE O/P EST MOD 30 MIN: CPT | Performed by: FAMILY MEDICINE

## 2022-11-28 PROCEDURE — 3008F BODY MASS INDEX DOCD: CPT | Performed by: FAMILY MEDICINE

## 2022-11-28 PROCEDURE — 3079F DIAST BP 80-89 MM HG: CPT | Performed by: FAMILY MEDICINE

## 2022-11-28 PROCEDURE — 3075F SYST BP GE 130 - 139MM HG: CPT | Performed by: FAMILY MEDICINE

## 2022-11-28 RX ORDER — CARBAMAZEPINE 200 MG/1
200 TABLET ORAL 3 TIMES DAILY
COMMUNITY

## 2022-12-10 NOTE — TELEPHONE ENCOUNTER
Last visit 11/28/2022  Last refill 08/12/2022   a1c 09/18/2022   Diabetic Medication Protocol Failed 12/09/2022 10:39 PM   Protocol Details  Microalbumin procedure in past 12 months or taking ACE/ARB    HgBA1C procedure resulted in past 6 months    Last HgBA1C < 7.5    Appointment in past 6 or next 3 months

## 2022-12-11 RX ORDER — METFORMIN HYDROCHLORIDE 500 MG/1
TABLET, EXTENDED RELEASE ORAL
Qty: 60 TABLET | Refills: 2 | Status: SHIPPED | OUTPATIENT
Start: 2022-12-11

## 2022-12-18 PROBLEM — R42 DIZZINESS: Status: ACTIVE | Noted: 2022-12-13

## 2022-12-18 PROBLEM — R06.09 CHRONIC DYSPNEA: Status: ACTIVE | Noted: 2022-12-13

## 2022-12-18 PROBLEM — I42.9 FAMILIAL CARDIOMYOPATHY (HCC): Status: ACTIVE | Noted: 2022-12-13

## 2022-12-18 PROBLEM — R07.9 CHEST PAIN, UNSPECIFIED: Status: ACTIVE | Noted: 2022-12-13

## 2022-12-18 PROBLEM — E78.00 HYPERCHOLESTEREMIA: Status: ACTIVE | Noted: 2022-02-25

## 2022-12-21 ENCOUNTER — HOSPITAL ENCOUNTER (OUTPATIENT)
Dept: CV DIAGNOSTICS | Facility: HOSPITAL | Age: 22
Discharge: HOME | End: 2022-12-21
Attending: INTERNAL MEDICINE
Payer: COMMERCIAL

## 2022-12-21 DIAGNOSIS — R42 DIZZINESS: ICD-10-CM

## 2022-12-21 DIAGNOSIS — R06.09 DOE (DYSPNEA ON EXERTION): ICD-10-CM

## 2022-12-21 DIAGNOSIS — R07.9 CHEST PAIN, UNSPECIFIED TYPE: ICD-10-CM

## 2022-12-21 PROCEDURE — 93306 TTE W/DOPPLER COMPLETE: CPT | Performed by: INTERNAL MEDICINE

## 2022-12-28 ENCOUNTER — HOSPITAL ENCOUNTER (OUTPATIENT)
Age: 22
Discharge: HOME | End: 2022-12-28
Payer: COMMERCIAL

## 2022-12-28 VITALS
OXYGEN SATURATION: 98 % | RESPIRATION RATE: 20 BRPM | TEMPERATURE: 98 F | SYSTOLIC BLOOD PRESSURE: 129 MMHG | DIASTOLIC BLOOD PRESSURE: 89 MMHG | HEART RATE: 99 BPM

## 2022-12-28 DIAGNOSIS — Z20.822 ENCOUNTER FOR SCREENING LABORATORY TESTING FOR COVID-19 VIRUS IN ASYMPTOMATIC PATIENT: Primary | ICD-10-CM

## 2022-12-28 PROBLEM — I10 HYPERTENSION: Status: ACTIVE | Noted: 2022-12-27

## 2022-12-28 LAB — SARS-COV-2 RNA RESP QL NAA+PROBE: NOT DETECTED

## 2022-12-28 PROCEDURE — 3074F SYST BP LT 130 MM HG: CPT | Performed by: NURSE PRACTITIONER

## 2022-12-28 PROCEDURE — 3079F DIAST BP 80-89 MM HG: CPT | Performed by: NURSE PRACTITIONER

## 2022-12-28 PROCEDURE — U0002 COVID-19 LAB TEST NON-CDC: HCPCS | Performed by: NURSE PRACTITIONER

## 2023-02-22 ENCOUNTER — TELEPHONE (OUTPATIENT)
Dept: FAMILY MEDICINE CLINIC | Facility: CLINIC | Age: 23
End: 2023-02-22

## 2023-02-22 NOTE — TELEPHONE ENCOUNTER
(Pt is non- binary, goes by they)  They have had their period since Feb 7, 2023  Also, Mom wants to talk to the nurse because  Pt was was discharged from [facility] and they have a new Dx and medication    Pt scheduled an appt this Friday @ 11:15 with Dr Gladis Mayfield

## 2023-02-22 NOTE — TELEPHONE ENCOUNTER
Patient has appt 2/24 with Dr Sidney Smith just wanted to update her on some things before they came in  Patient recently discharged from [facility] and have new meds and paperwork that they will bring to their appt    Also states that they have been having their period since 2/7  Usually lasts about 6 days  No dizziness/SOB  No clots  Changing pad/tampon twice daily  Some cramping daily  Advised to keep appt for Friday and will route message to Dr Qiana Marmolejo. Verbalized understanding.      Future Appointments   Date Time Provider Joel Chisholm   2/24/2023 11:15 AM Bright Callahan MD [facility] POST AC[facility]   2/28/2023  8:30 AM ADULT PSYCH PHP - [facility]   3/1/2023  8:30 AM ADULT PSYCH PHP - PFLD [facility]   3/2/2023  8:30 AM ADULT PSYCH PHP - PFLD [facility]   3/3/2023  8:30 AM ADULT PSYCH PHP - PFLD [facility] [place]   3/6/2023  8:30 AM ADULT PSYCH PHP - PFLD [facility] [place]   3/9/2023  3:00 PM [facility] [place]   3/16/2023  3:00 PM [place] Marts, [facility] [place]   3/23/2023  3:00 PM Arianne Marts, [facility] [place]   3/30/2023  3:00 PM Arianne Marts, [facility] [place]   4/6/2023  3:00 PM [place] [facility], [facility] [place]   4/13/2023  3:00 PM Arianne Marts, [facility] [place]   4/27/2023  3:00 PM Arianne Marts, APRN [address]   5/4/2023  3:00 PM Arianne Marts, APRN [address]   6/29/2023  3:00 PM [address]

## 2023-02-24 ENCOUNTER — OFFICE VISIT (OUTPATIENT)
Dept: FAMILY MEDICINE CLINIC | Facility: CLINIC | Age: 23
End: 2023-02-24
Payer: COMMERCIAL

## 2023-02-24 VITALS
BODY MASS INDEX: 50.02 KG/M2 | DIASTOLIC BLOOD PRESSURE: 80 MMHG | TEMPERATURE: 95 F | SYSTOLIC BLOOD PRESSURE: 118 MMHG | HEART RATE: 120 BPM | OXYGEN SATURATION: 98 % | HEIGHT: 64 IN | RESPIRATION RATE: 18 BRPM | WEIGHT: 293 LBS

## 2023-02-24 DIAGNOSIS — E55.9 VITAMIN D DEFICIENCY: Primary | ICD-10-CM

## 2023-02-24 DIAGNOSIS — E03.9 HYPOTHYROIDISM, UNSPECIFIED TYPE: ICD-10-CM

## 2023-02-24 DIAGNOSIS — N92.1 PROLONGED MENSTRUAL CYCLE: ICD-10-CM

## 2023-02-24 LAB
TSI SER-ACNC: 1.59 MIU/ML (ref 0.36–3.74)
VIT D+METAB SERPL-MCNC: 29.4 NG/ML (ref 30–100)

## 2023-02-24 PROCEDURE — 99214 OFFICE O/P EST MOD 30 MIN: CPT | Performed by: FAMILY MEDICINE

## 2023-02-24 PROCEDURE — 3008F BODY MASS INDEX DOCD: CPT | Performed by: FAMILY MEDICINE

## 2023-02-24 PROCEDURE — 84443 ASSAY THYROID STIM HORMONE: CPT | Performed by: FAMILY MEDICINE

## 2023-02-24 PROCEDURE — 3079F DIAST BP 80-89 MM HG: CPT | Performed by: FAMILY MEDICINE

## 2023-02-24 PROCEDURE — 82306 VITAMIN D 25 HYDROXY: CPT | Performed by: FAMILY MEDICINE

## 2023-02-24 PROCEDURE — 3074F SYST BP LT 130 MM HG: CPT | Performed by: FAMILY MEDICINE

## 2023-02-24 RX ORDER — NEOMYCIN/POLYMYXIN B/PRAMOXINE 3.5-10K-1
CREAM (GRAM) TOPICAL
COMMUNITY

## 2023-02-24 RX ORDER — ESZOPICLONE 2 MG/1
2 TABLET, FILM COATED ORAL NIGHTLY
COMMUNITY
Start: 2023-01-12

## 2023-02-24 RX ORDER — TOPIRAMATE 25 MG/1
25 TABLET ORAL 2 TIMES DAILY
COMMUNITY
Start: 2023-02-02

## 2023-02-24 RX ORDER — IBUPROFEN 600 MG/1
600 TABLET ORAL EVERY 6 HOURS PRN
COMMUNITY

## 2023-02-24 RX ORDER — NALTREXONE HYDROCHLORIDE 50 MG/1
50 TABLET, FILM COATED ORAL NIGHTLY
COMMUNITY
Start: 2023-01-31

## 2023-02-24 RX ORDER — LIOTHYRONINE SODIUM 5 UG/1
10 TABLET ORAL DAILY
COMMUNITY

## 2023-02-24 RX ORDER — CLOMIPRAMINE HYDROCHLORIDE 75 MG/1
75 CAPSULE ORAL NIGHTLY
COMMUNITY
Start: 2023-02-09

## 2023-02-24 RX ORDER — METHYLPHENIDATE HYDROCHLORIDE 18 MG/1
TABLET ORAL
COMMUNITY
Start: 2023-02-15

## 2023-02-24 RX ORDER — LURASIDONE HYDROCHLORIDE 80 MG/1
80 TABLET, FILM COATED ORAL
COMMUNITY

## 2023-02-24 RX ORDER — PNV NO.95/FERROUS FUM/FOLIC AC 28MG-0.8MG
TABLET ORAL
COMMUNITY

## 2023-02-28 ENCOUNTER — TELEPHONE (OUTPATIENT)
Dept: FAMILY MEDICINE CLINIC | Facility: CLINIC | Age: 23
End: 2023-02-28

## 2023-02-28 NOTE — TELEPHONE ENCOUNTER
----- Message from Fifi Mojica MD sent at 2/27/2023  6:25 PM CST -----  TSH is normal.  Continue current medication dose  Vitamin D borderline low.   Will take time to come up, continue current dosage

## 2023-03-01 PROBLEM — F84.0 AUTISM SPECTRUM DISORDER (HCC): Status: ACTIVE | Noted: 2023-03-01

## 2023-03-01 PROBLEM — F41.1 GAD (GENERALIZED ANXIETY DISORDER): Status: ACTIVE | Noted: 2023-03-01

## 2023-03-01 PROBLEM — F84.0 AUTISM SPECTRUM DISORDER: Status: ACTIVE | Noted: 2023-03-01

## 2023-03-01 PROBLEM — F50.9 EATING DISORDER: Status: ACTIVE | Noted: 2023-03-01

## 2023-03-01 PROBLEM — F41.9 ANXIETY DISORDER: Status: ACTIVE | Noted: 2023-03-01

## 2023-03-01 PROBLEM — F90.2 ATTENTION DEFICIT HYPERACTIVITY DISORDER (ADHD), COMBINED TYPE: Status: ACTIVE | Noted: 2023-03-01

## 2023-03-01 NOTE — TELEPHONE ENCOUNTER
Recommend monitoring another 2 to 3 weeks. Continue birth control pills.   call back if menses has not stopped

## 2023-03-10 RX ORDER — PRAVASTATIN SODIUM 20 MG
TABLET ORAL
Qty: 30 TABLET | Refills: 2 | Status: SHIPPED | OUTPATIENT
Start: 2023-03-10

## 2023-03-10 RX ORDER — NORGESTIMATE AND ETHINYL ESTRADIOL
KIT
Qty: 28 TABLET | Refills: 2 | Status: SHIPPED | OUTPATIENT
Start: 2023-03-10

## 2023-03-10 NOTE — TELEPHONE ENCOUNTER
Last ordered: 5 months ago by Brenda Mitchell MD Last refill: 12/9/2022       Last seen on 02/24/23 ( irregular periods & thyroid problem ) /80. Last pap :no pap on record    Gynecology Medication Protocol Failed 03/10/2023 12:24 PM    PASS-PENDING LAST PAP WNL--VIA MANUAL LOOKUP      Per protocol failed   Please advise. RX pended.

## 2023-03-10 NOTE — TELEPHONE ENCOUNTER
Cholesterol Medication Protocol Passed 03/10/2023 12:24 PM   Protocol Details  ALT < 80    ALT resulted within past year    Lipid panel within past 12 months    Appointment within past 12 or next 3 months        PRAVASTATIN 20 MG Oral Tab 90 tablet 0 9/7/2022     Sig: TAKE 1 TABLET BY MOUTH EVERY DAY AT NIGHT    Sent to pharmacy as: Pravastatin Sodium 20 MG Oral Tablet (Pravachol)      PRAVASTATIN 20 MG Oral Tab 90 tablet 0 9/7/2022     Sig: TAKE 1 TABLET BY MOUTH EVERY DAY AT NIGHT    Sent to pharmacy as: Pravastatin Sodium 20 MG Oral Tablet (Pravachol)        Last LIPID: 11/04/22  Last seen on 02/24/23 ( irregular periods & thyroid problem ) /80. Future Appointments   Date Time Provider Joel Chisholm   3/13/2023  8:30 AM ADULT PSYCH PHP - PFLD [facility]   3/16/2023  3:00 PM [facility] [place]   3/23/2023  3:00 PM [facility] [place]   3/30/2023  3:00 PM [facility] [place]   4/6/2023  3:00 PM [facility] [place]   4/13/2023  3:00 PM [facility] [place]   4/27/2023  3:00 PM [facility] [place]   5/4/2023  3:00 PM [facility] [place]   6/29/2023  [facility] [place]      rx sent   Per protocol passed. Thank you.

## 2023-03-22 NOTE — TELEPHONE ENCOUNTER
Thyroid Supplements Protocol Passed   Last refill external  Last OV 2/24/23  Future Appointments   Date Time Provider Joel Chisholm   3/23/2023  3:00 PM JOSÉ Resendiz [facility]   3/30/2023  3:00 PM JOSÉ Resendiz [facility]   4/6/2023  3:00 PM JOSÉ Resendiz [facility]   4/13/2023  3:00 PM JOSÉ Resendiz [facility]   4/27/2023  3:00 PM JOSÉ Resendiz [address]   5/4/2023  3:00 PM JOSÉ Resendiz [address]   6/29/2023  3:00 PM JOSÉ Resendiz [facility]

## 2023-03-24 RX ORDER — LIOTHYRONINE SODIUM 5 UG/1
10 TABLET ORAL DAILY
Qty: 90 TABLET | Refills: 0 | Status: SHIPPED | OUTPATIENT
Start: 2023-03-24
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
## 2023-04-06 ENCOUNTER — TELEPHONE (OUTPATIENT)
Dept: FAMILY MEDICINE CLINIC | Facility: CLINIC | Age: 23
End: 2023-04-06

## 2023-04-06 NOTE — TELEPHONE ENCOUNTER
Received [facility] from Patient's father:  Belvie Rubinstein, listed on patient's consent to release information. Patient seen at [facility] ER on 4/3/23 - cannot find discharge diagnosis  Patient was sent to ER for chest pain  Please review and advise on message below. From: Jigna Oliveira  To: Pernell Marcelino MD  Sent: 4/6/2023 10:39 AM CDT  Subject: Susy Pena is in [facility]. We heard she was taking to [facility] for high blood pressure and chest pains. Susy Shah claims they found a mass in her lungs. Can you check to see if you see those records? Shannan and karishma are confused.

## 2023-04-07 NOTE — TELEPHONE ENCOUNTER
ER notes reviewed. Spoke with dad.   Both parents on consent  Pt discharge from ER back to psych inpt facility  No other concerns at this time

## 2023-04-14 ENCOUNTER — TELEPHONE (OUTPATIENT)
Dept: FAMILY MEDICINE CLINIC | Facility: CLINIC | Age: 23
End: 2023-04-14

## 2023-04-15 ENCOUNTER — OFFICE VISIT (OUTPATIENT)
Dept: FAMILY MEDICINE CLINIC | Facility: CLINIC | Age: 23
End: 2023-04-15
Payer: COMMERCIAL

## 2023-04-15 VITALS
BODY MASS INDEX: 50.02 KG/M2 | TEMPERATURE: 97 F | HEIGHT: 64 IN | SYSTOLIC BLOOD PRESSURE: 120 MMHG | DIASTOLIC BLOOD PRESSURE: 70 MMHG | OXYGEN SATURATION: 98 % | RESPIRATION RATE: 18 BRPM | HEART RATE: 117 BPM | WEIGHT: 293 LBS

## 2023-04-15 DIAGNOSIS — R00.0 TACHYCARDIA: Primary | ICD-10-CM

## 2023-04-15 DIAGNOSIS — I10 ESSENTIAL HYPERTENSION: ICD-10-CM

## 2023-04-15 PROCEDURE — 3078F DIAST BP <80 MM HG: CPT | Performed by: FAMILY MEDICINE

## 2023-04-15 PROCEDURE — 99214 OFFICE O/P EST MOD 30 MIN: CPT | Performed by: FAMILY MEDICINE

## 2023-04-15 PROCEDURE — 3074F SYST BP LT 130 MM HG: CPT | Performed by: FAMILY MEDICINE

## 2023-04-15 PROCEDURE — 3008F BODY MASS INDEX DOCD: CPT | Performed by: FAMILY MEDICINE

## 2023-04-15 RX ORDER — AMLODIPINE BESYLATE 5 MG/1
TABLET ORAL
COMMUNITY
Start: 2023-04-11

## 2023-04-15 RX ORDER — PROPRANOLOL HYDROCHLORIDE 10 MG/1
10 TABLET ORAL DAILY
Qty: 30 TABLET | Refills: 0 | Status: SHIPPED | OUTPATIENT
Start: 2023-04-15

## 2023-05-01 NOTE — ED INITIAL ASSESSMENT (HPI)
From [facility] for medical clearance. There for SI with plan. BP elevated 162/118 and . Section of Hematology and Stem Cell Transplantation  Graft Versus Host Disease Clinic  Follow Up Visit     Urgent Care Oncology Visit    Date and Time: 05/01/2023 5:40 PM   Patient MRN: [id number]   Chief Complaint: No chief complaint on file.    Reason for Urgent Care Visit:  swelling/pain in ankle, cough/dyspnea, weakness, bradycardia  Intervention: education provided, medication change, and prescriptions sent  Dispo: return to clinic as previous  UrgOnc appointment addressed via: [facility] message  This UrgOnc visit was in person  Time spent handling UC issue:  40 minutes    Was this virtual or in person UrgOnc visit appropriate? 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 CT chest with ground-glass infiltrates in bilateral upper and lower lobes. Transbronchial biopsy (LLL) concerning for viral pneumonia, but cGVHD could not be ruled out.  Started FAM.  8/19/21: PFTs unremarkable.   3/30/22: PFTs normal.    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     She states that since her PFTs, she has had exhaustion and  increased cough. She feels as though she ran a marathon. She has had a persistent cough since then. She does not feel well overall.     She also states she has had localized swelling overlying her left ankle. No erythema, drainage, or pruritus. The area is painful to touch. No fevers or chills.     This morning while eating breakfast, she got an alert from her apple watch that she had bradycardia (50s). She did not have any chest pain or palpitations at the time.     PAST MEDICAL HISTORY:   Past Medical History:   Diagnosis Date    Anxiety     Asthma     seasonal  bronchitis    Depression     Difficult intubation     per anesthesia note dated 9/22    GERD (gastroesophageal reflux disease)     Hx of psychiatric care     Hypertension     Hypothyroid     Pneumonitis 05/11/2022    Admitted 7/2021 with acute hypoxic respiratory failure. Bronchoscopy c/w with acute viral illness.    Now back to baseline. PFTs normal 8/2021    Psychiatric problem     Sleep difficulties     Therapy        PAST SURGICAL HISTORY:   Past Surgical History:   Procedure Laterality Date    bilateral hand surgery      BONE MARROW BIOPSY Left 09/21/2022    Procedure: Biopsy-bone marrow;  Surgeon: Yair Vaz MD;  Location: [facility] (4TH FLR);  Service: Oncology;  Laterality: Left;    BRONCHOSCOPY N/A 07/20/2021    Procedure: BRONCHOSCOPY;  Surgeon: [facility] Diagnostic Provider;  Location: [facility] OR 2ND FLR;  Service: Anesthesiology;  Laterality: N/A;    chronic low back pain      COLONOSCOPY N/A 11/18/2020    Procedure: COLONOSCOPY;  Surgeon: Robin Cho MD;  Location: [facility] (4TH FLR);  Service: Endoscopy;  Laterality: N/A;  covid-11/15/79-Zrwhtxa-GY    ESOPHAGOGASTRODUODENOSCOPY N/A 11/18/2020    Procedure: EGD (ESOPHAGOGASTRODUODENOSCOPY);  Surgeon: Robin Cho MD;  Location: [facility] (4TH FLR);  Service: Endoscopy;  Laterality: N/A;  covid-11/15/28-Syggbsk-OE    INSERTION OF PANDEY CATHETER N/A 09/08/2020    Procedure:  INSERTION, CATHETER, CENTRAL VENOUS, PANDEY;  Surgeon: [facility] Diagnostic Provider;  Location: [facility] OR 2ND FLR;  Service: General;  Laterality: N/A;    MEDIPORT REMOVAL N/A 02/10/2021    Procedure: REMOVAL TUNNELED CATH;  Surgeon: [facility] Diagnostic Provider;  Location: [facility] OR;  Service: Radiology;  Laterality: N/A;  10AM START    OOPHORECTOMY      SPLENECTOMY N/A 05/10/2021    Procedure: SPLENECTOMY, OPEN; OPEN CHOLECYSTECTOMY;  Surgeon: Tete Moya MD;  Location: [facility] OR 2ND FLR;  Service: General;  Laterality: N/A;    TONSILLECTOMY      uvulaplasty      variocse vein stipping         PAST SOCIAL HISTORY:  Social History     Tobacco Use    Smoking status: Former     Packs/day: 0.25     Years: 15.00     Pack years: 3.75     Types: Cigarettes     Quit date: 2001     Years since quittin.9    Smokeless tobacco: Never    Tobacco comments:     1 pack per week   Substance Use Topics    Alcohol use: Yes     Comment: occassional    Drug use: No       FAMILY HISTORY:  Family History   Problem Relation Age of Onset    Drug abuse Brother     Breast cancer Neg Hx     Colon cancer Neg Hx     Ovarian cancer Neg Hx        CURRENT MEDICATIONS:   Current Outpatient Medications   Medication Sig    acyclovir (ZOVIRAX) 400 MG tablet TAKE ONE TABLET BY MOUTH TWICE DAILY    atorvastatin (LIPITOR) 40 MG tablet Take 40 mg by mouth every evening.    conjugated estrogens (PREMARIN) vaginal cream Place 1 g vaginally twice a week.    fluocinonide (LIDEX) 0.05 % external solution SMARTSI Milliliter(s) Topical 1 to 2 Times Daily    HYDROcodone-acetaminophen (NORCO) 5-325 mg per tablet Take 1 tablet by mouth every 6 (six) hours as needed.    hydroquinone 4 % Crea Use hs for dark spots    lansoprazole (PREVACID) 30 MG capsule TAKE ONE CAPSULE BY MOUTH ONCE DAILY    levothyroxine (SYNTHROID) 150 MCG tablet take 1 tablet by mouth once daily    lifitegrast (XIIDRA) 5 % Dpet Apply 1 drop to eye 2 (two) times daily.    methocarbamoL  (ROBAXIN) 500 MG Tab TAKE ONE TABLET BY MOUTH FOUR TIMES DAILY FOR 10 DAYS    metoprolol succinate (TOPROL-XL) 50 MG 24 hr tablet take 1 tablet by mouth once daily    tirzepatide (MOUNJARO) 2.5 mg/0.5 mL PnIj Inject 2.5 mg into the skin.    traZODone (DESYREL) 50 MG tablet Take 1 tablet (50 mg total) by mouth every evening.    triamterene-hydrochlorothiazide 75-50 mg (MAXZIDE) 75-50 mg per tablet take 1 tablet by mouth once daily    VITAMIN D2 1,250 mcg (50,000 unit) capsule Take 50,000 Units by mouth every 7 days.    cetirizine (ZYRTEC) 10 MG tablet Take 1 tablet (10 mg total) by mouth once daily.    meloxicam (MOBIC) 7.5 MG tablet Take 1 tablet (7.5 mg total) by mouth once daily.    ondansetron (ZOFRAN-ODT) 8 MG TbDL Take 8 mg by mouth 3 (three) times daily.    triamcinolone acetonide 0.1% (KENALOG) 0.1 % cream Apply topically 2 (two) times daily.     No current facility-administered medications for this visit.       ALLERGIES:   Review of patient's allergies indicates:  No Known Allergies    GVHD Review of Systems:     N/A    Physical Exam:     Vitals:    05/01/23 1304   BP: 128/72   Pulse: 84   Resp: 16     General: Appears well, NAD  Oropharynx:  MMM, no oral lesions  Pulmonary: CTAB, no increased work of breathing, no W/R/C  Cardiovascular: S1S2 normal, RRR, no M/R/G  Abdominal: Soft, NT, ND, BS+, no HSM  Extremities: No C/C/E  Neurological: AAOx4, grossly normal, no focal deficits  Dermatologic: left ankle with wheal noted, no erythema or pustule, tender    ECOG Performance Status: (foot note - ECOG PS provided by Eastern Cooperative Oncology Group) 1 - Symptomatic but completely ambulatory    Karnofsky Performance Score:  90%- Able to Carry on Normal Activity: Minor Symptoms of Disease    Labs:   Lab Results   Component Value Date    WBC 8.57 03/27/2023    HGB 14.6 03/27/2023    HCT 43.7 03/27/2023    MCV 94 03/27/2023     03/27/2023       Lab Results   Component Value Date     03/27/2023    K  3.4 (L) 2023     2023    CO2 28 2023    BUN 20 2023    CREATININE 0.9 2023    ALBUMIN 3.5 2023    BILITOT 0.8 2023    ALKPHOS 173 (H) 2023    AST 17 2023    ALT 24 2023       Imaging:   Reviewed    Pathology:  Reviewed    NIH CHRONIC GVHD SCORIN. Ocular: 1 (using drops 3+ times per day, dry eyes, no crusting)  2. Oral: 0  3. Skin: 0  4. Fascia: 0  5. Liver: 0  6. GI: 0  7. Lun  8. Genital: 0  9. 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    Bradycardia: RRR on exam today. Asymptomatic. EKG obtained which was largely unremarkable (possible left atrial enlargement). Will obtain echocardiogram.     Dyspnea/cough/fatigue: No infectious signs/symptoms. Possibly related to bronchospasm. Encouraged to use albuterol inhaler q6h for now. Zyrtec as below.    Wheal: Possibly secondary to a bug bite. No infectious signs or symptoms. Will prescribe Mobic and Zyrtec.     Chronic GVHD:  Currently she has dry eyes for which she is using artificial tears 2-3x daily (NIH score 1).  Her NIH chronic GVHD global severity score is 1 (mild).  Continue preservative free eye drops as needed.  Address at next visit.    Follow up: As previously scheduled    Orders Placed:      Orders Placed This Encounter    EKG 12-lead    Echo    triamcinolone acetonide 0.1% (KENALOG) 0.1 % cream    cetirizine (ZYRTEC) 10 MG tablet    meloxicam (MOBIC) 7.5 MG tablet       Route Chart for Scheduling    BMT Chart Routing      Follow up with physician Other. as previously scheduled   Follow up with JENNIFER    Provider visit type    Infusion scheduling note    Injection scheduling note    Labs    Imaging ECHO   echo  next available   Pharmacy appointment    Other referrals          Total time of this visit was 40 minutes, including time spent face to face with patient, and also in preparing for today's visit for MDM and documentation. (Medical Decision Making, including consideration of possible diagnoses, management options, complex medical record review, review of diagnostic tests and information, consideration and discussion of significant complications based on comorbidities, and discussion with providers involved with the care of the patient). Greater than 50% was spent face to face with the patient counseling and coordinating care.    Rock Frank MD  Hematology, Oncology, and Stem Cell Transplantation  [facility]

## 2023-05-10 ENCOUNTER — HOSPITAL ENCOUNTER (EMERGENCY)
Facility: HOSPITAL | Age: 23
Discharge: HOME | End: 2023-05-11
Attending: EMERGENCY MEDICINE
Payer: COMMERCIAL

## 2023-05-10 VITALS
OXYGEN SATURATION: 96 % | RESPIRATION RATE: 18 BRPM | DIASTOLIC BLOOD PRESSURE: 71 MMHG | TEMPERATURE: 97 F | WEIGHT: 293 LBS | HEART RATE: 99 BPM | SYSTOLIC BLOOD PRESSURE: 143 MMHG | BODY MASS INDEX: 61 KG/M2

## 2023-05-10 DIAGNOSIS — Z72.89 SELF-INJURIOUS BEHAVIOR: Primary | ICD-10-CM

## 2023-05-10 PROBLEM — F31.4 SEVERE DEPRESSED BIPOLAR I DISORDER WITHOUT PSYCHOTIC FEATURES (HCC): Status: ACTIVE | Noted: 2023-05-10

## 2023-05-10 PROCEDURE — 99283 EMERGENCY DEPT VISIT LOW MDM: CPT

## 2023-05-10 PROCEDURE — 99282 EMERGENCY DEPT VISIT SF MDM: CPT

## 2023-05-27 PROBLEM — F33.9 MAJOR DEPRESSION, RECURRENT: Status: RESOLVED | Noted: 2023-05-27 | Resolved: 2023-05-27

## 2023-05-27 PROBLEM — F33.9 MAJOR DEPRESSION, RECURRENT: Status: ACTIVE | Noted: 2023-05-27

## 2023-05-27 PROBLEM — F33.9 MAJOR DEPRESSION, RECURRENT (HCC): Status: ACTIVE | Noted: 2023-05-27

## 2023-05-27 PROBLEM — F33.9 MAJOR DEPRESSION, RECURRENT (HCC): Status: RESOLVED | Noted: 2023-05-27 | Resolved: 2023-05-27

## 2023-06-02 RX ORDER — METFORMIN HYDROCHLORIDE 500 MG/1
TABLET, EXTENDED RELEASE ORAL
Qty: 60 TABLET | Refills: 2 | Status: SHIPPED | OUTPATIENT
Start: 2023-06-02

## 2023-06-02 NOTE — TELEPHONE ENCOUNTER
Last visit 04/15/2023  Last refill 12/22/2022     Diabetic Medication Protocol Failed 06/02/2023 02:53 AM   Protocol Details  Microalbumin procedure in past 12 months or taking ACE/ARB    HgBA1C procedure resulted in past 6 months    Last HgBA1C < 7.5    Appointment in past 6 or next 3 months

## 2023-06-15 NOTE — LETTER
Medicare Wellness Visit  Plan for Preventive Care    A good way for you to stay healthy is to use preventive care.  Medicare covers many services that can help you stay healthy.* The goal of these services is to find any health problems as quickly as possible. Finding problems early can help make them easier to treat.  Your personal plan below lists the services you may need and when they are due.      Health Maintenance Summary       Shingles Vaccine (1 of 2)  Overdue - never done    COVID-19 Vaccine (5 - Booster for Moderna series)  Overdue since 8/10/2022    Traditional Medicare- Medicare Wellness Visit (Yearly)  Due since 6/15/2023    Depression Screening (Yearly)  Due since 6/15/2023    Influenza Vaccine (Season Ended)  Next due on 9/1/2023    Breast Cancer Screening (Every 2 Years)  Scheduled for 7/20/2023    DTaP/Tdap/Td Vaccine (2 - Td or Tdap)  Next due on 6/17/2029    Colorectal Cancer Screen- (Colonoscopy - Every 10 Years)  Next due on 6/24/2030    Hepatitis C Screening   Completed    Pneumococcal Vaccine 65+   Completed    Osteoporosis Screening   Completed    Meningococcal Vaccine   Aged Out    Hepatitis B Vaccine (For Physician/APC Discussion)   Aged Out    HPV Vaccine   Aged Out    Colorectal Cancer Risk - Colonoscopy   Discontinued             Preventive Care for Women and Men    Heart Screenings (Cardiovascular):  Blood tests are used to check your cholesterol, lipid and triglyceride levels. High levels can increase your risk for heart disease and stroke. High levels can be treated with medications, diet and exercise. Lowering your levels can help keep your heart and blood vessels healthy.  Your provider will order these tests if they are needed.    An ultrasound is done to see if you have an abdominal aortic aneurysm (AAA).  This is an enlargement of one of the main blood vessels that delivers blood to the body.   In the [place], 9,000 deaths are caused by AAA.  You may not even know you have  Patient Name: Anthony Perez  YOB: 2000          MRN number:  [id number]  Date:  2/25/2019  Referring Physician:  Sharyle Najjar    Discharge Summary  Initial Functional Outcome Score 53/100  Final Functional Outcome Score 74/100  Number of Assessment: Aga Cali has completed 12 visits of PT, Dad and Aga Cali reports feels ready to DC this date and to cancel future appts, awaiting arrival of orthotics. Sandy has normal restored ankle AROM.  Improved single limb stability, activity tolerance and impro this problem and as many as 1 in 3 people will have a serious problem if it is not treated.  Early diagnosis allows for more effective treatment and cure.  If you have a family history of AAA or are a male age 65-75 who has smoked, you are at higher risk of an AAA.  Your provider can order this test, if needed.    Colorectal Screening:  There are many tests that are used to check for cancer of your colon and rectum. You and your provider should discuss what test is best for you and when to have it done.  Options include:  Screening Colonoscopy: exam of the entire colon, seen through a flexible lighted tube.  Flexible Sigmoidoscopy: exam of the last third (sigmoid portion) of the colon and rectum, seen through a flexible lighted tube.  Cologuard DNA stool test: a sample of your stool is used to screen for cancer and unseen blood in your stool.  Fecal Occult Blood Test: a sample of your stool is studied to find any unseen blood    Flu Shot:  An immunization that helps to prevent influenza (the flu). You should get this every year. The best time to get the shot is in the fall.    Pneumococcal Shot:  Vaccines help prevent pneumococcal disease, which is any type of illness caused by Streptococcus pneumoniae bacteria. There are two kinds of pneumococcal vaccines available in the [place]:   Pneumococcal conjugate vaccines (PCV20 or Bsiwfum41®)  Pneumococcal polysaccharide vaccine (PPSV23 or Tjlbggsqy30®)  For those who have never received any pneumococcal conjugate vaccine, CDC recommends PVC20 for adults 65 years or older and adults 19 through 64 years old with certain medical conditions or risk factors.   For those who have previously received PCV13, this should be followed by a dose of PPSV23.     Hepatitis B Shot:  An immunization that helps to protect people from getting Hepatitis B. Hepatitis B is a virus that spreads through contact with infected blood or body fluids. Many people with the virus do not have  symptoms.  The virus can lead to serious problems, such as liver disease. Some people are at higher risk than others. Your doctor will tell you if you need this shot.     Diabetes Screening:  A test to measure sugar (glucose) in your blood is called a fasting blood sugar. Fasting means you cannot have food or drink for at least 8 hours before the test. This test can detect diabetes long before you may notice symptoms.    Glaucoma Screening:  Glaucoma screening is performed by your eye doctor. The test measures the fluid pressure inside your eyes to determine if you have glaucoma.     Hepatitis C Screening:  A blood test to see if you have the hepatitis C virus.  Hepatitis C attacks the liver and is a major cause of chronic liver disease.  Medicare will cover a single screening for all adults born between 1945 & 1965, or high risk patients (people who have injected illegal drugs or people who have had blood transfusions).  High risk patients who continue to inject illegal drugs can be screened for Hepatitis C every year.    Smoking and Tobacco-Use Cessation Counseling:  Tobacco is the single greatest cause of disease and early death in our country today. Medication and counseling together can increase a person’s chance of quitting for good.   Medicare covers two quitting attempts per year, with four counseling sessions per attempt (eight sessions in a 12 month period)    Preventive Screening tests for Women    Screening Mammograms and Breast Exams:  An x-ray of your breasts to check for breast cancer before you or your doctor may be able to feel it.  If breast cancer is found early it can usually be treated with success.    Pelvic Exams and Pap Tests:  An exam to check for cervical and vaginal cancer. A Pap test is a lab test in which cells are taken from your cervix and sent to the lab to look for signs of cervical cancer. If cancer of the cervix is found early, chances for a cure are good. Testing can generally end  treatment options and has agreed to actively participate in planning and for this course of care. Thank you for your referral. If you have any questions, please contact me at Dept: [phone].     Sincerely,  Electronically signed by therapist: Janice Perez at age 65, or if a woman has a hysterectomy for a benign condition. Your provider may recommend more frequent testing if certain abnormal results are found.    Bone Mass Measurements:  A painless x-ray of your bone density to see if you are at risk for a broken bone. Bone density refers to the thickness of bones or how tightly the bone tissue is packed.    Preventive Screening tests for Men    Prostate Screening:  Should you have a prostate cancer test (PSA)?  It is up to you to decide if you want a prostate cancer test. Talk to your clinician to find out if the test is right for you.  Things for you to consider and talk about should include:  Benefits and harms of the test  Your family history  How your race/ethnicity may influence the test  If the test may impact other medical conditions you have  Your values on screenings and treatments    *Medicare pays for many preventive services to keep you healthy. For some of these services, you might have to pay a deductible, coinsurance, and / or copayment.  The amounts vary depending on the type of services you need and the kind of Medicare health plan you have.    For further details on screenings offered by Medicare please visit: https://www.medicare.gov/coverage/preventive-screening-services

## 2023-06-16 RX ORDER — NORGESTIMATE AND ETHINYL ESTRADIOL
KIT
Qty: 28 TABLET | Refills: 2 | Status: SHIPPED | OUTPATIENT
Start: 2023-06-16

## 2023-06-16 NOTE — TELEPHONE ENCOUNTER
Last visit 04/15/2023  Last refill 05/30/2023     Gynecology Medication Protocol Failed 06/16/2023 12:41 AM    PASS-PENDING LAST PAP WNL--VIA MANUAL LOOKUP    Physical or Pelvic/Breast in past 12 or next 3 mos--VIA MANUAL LOOKUP

## 2023-06-16 NOTE — TELEPHONE ENCOUNTER
LMTCB pt and mom  Want to make sure they are aware of med interaction and decrease in effectiveness prior to filling

## 2023-07-05 RX ORDER — LIOTHYRONINE SODIUM 5 UG/1
TABLET ORAL
Qty: 60 TABLET | Refills: 1 | OUTPATIENT
Start: 2023-07-05

## 2023-09-05 ENCOUNTER — OFFICE VISIT (OUTPATIENT)
Dept: FAMILY MEDICINE CLINIC | Facility: CLINIC | Age: 23
End: 2023-09-05
Payer: COMMERCIAL

## 2023-09-05 VITALS
WEIGHT: 293 LBS | HEART RATE: 84 BPM | OXYGEN SATURATION: 98 % | TEMPERATURE: 99 F | BODY MASS INDEX: 50.02 KG/M2 | DIASTOLIC BLOOD PRESSURE: 72 MMHG | SYSTOLIC BLOOD PRESSURE: 103 MMHG | HEIGHT: 64 IN | RESPIRATION RATE: 18 BRPM

## 2023-09-05 DIAGNOSIS — J02.9 SORE THROAT: Primary | ICD-10-CM

## 2023-09-05 DIAGNOSIS — H10.32 ACUTE CONJUNCTIVITIS OF LEFT EYE, UNSPECIFIED ACUTE CONJUNCTIVITIS TYPE: ICD-10-CM

## 2023-09-05 LAB
CONTROL LINE PRESENT WITH A CLEAR BACKGROUND (YES/NO): YES YES/NO
KIT LOT #: NORMAL NUMERIC
STREP GRP A CUL-SCR: NEGATIVE

## 2023-09-05 PROCEDURE — 3074F SYST BP LT 130 MM HG: CPT | Performed by: NURSE PRACTITIONER

## 2023-09-05 PROCEDURE — 3008F BODY MASS INDEX DOCD: CPT | Performed by: NURSE PRACTITIONER

## 2023-09-05 PROCEDURE — 87880 STREP A ASSAY W/OPTIC: CPT | Performed by: NURSE PRACTITIONER

## 2023-09-05 PROCEDURE — 3078F DIAST BP <80 MM HG: CPT | Performed by: NURSE PRACTITIONER

## 2023-09-05 PROCEDURE — 99213 OFFICE O/P EST LOW 20 MIN: CPT | Performed by: NURSE PRACTITIONER

## 2023-09-05 RX ORDER — METFORMIN HYDROCHLORIDE 500 MG/1
500 TABLET, EXTENDED RELEASE ORAL 2 TIMES DAILY WITH MEALS
Qty: 60 TABLET | Refills: 0 | OUTPATIENT
Start: 2023-09-05

## 2023-09-05 RX ORDER — LISINOPRIL 10 MG/1
TABLET ORAL
COMMUNITY
Start: 2023-04-11 | End: 2023-09-07

## 2023-09-05 RX ORDER — LIOTHYRONINE SODIUM 5 UG/1
10 TABLET ORAL DAILY
Qty: 180 TABLET | Refills: 0 | OUTPATIENT
Start: 2023-09-05

## 2023-09-05 RX ORDER — ERYTHROMYCIN 5 MG/G
1 OINTMENT OPHTHALMIC 2 TIMES DAILY
Qty: 1 G | Refills: 0 | Status: SHIPPED | OUTPATIENT
Start: 2023-09-05 | End: 2023-09-10

## 2023-09-05 NOTE — TELEPHONE ENCOUNTER
Mom called, (on Verbal) mom received a mychart message today stating the Rx's have been denied. Pt has been out of the [address]  for a few days.    Please advise

## 2023-09-05 NOTE — TELEPHONE ENCOUNTER
Refills refused  Last OV 4/15/23  Last refill  LIOTHYRONINE SOD 5 MCG TAB 08/07/2023 08/05/2023 5 MCG 30 tablet  15 BETHANY SILVA     METFORMIN HCL  MG TABLET 07/05/2023 07/05/2023  60 each  Damir Thompson [address] [facility] [id number] [facility] - ...

## 2023-09-05 NOTE — PATIENT INSTRUCTIONS
Please start Erythromycin ointment to LEFT eye twice daily for five days as discussed. Warm pack or cold pack to eye for comfort. Change out pillowcase tonight and tomorrow. Wash hands often. Salt water gargles (1 tsp. Salt in 6 oz lukewarm water), use several times daily to help remove post nasal drainage and soothe throat. Saline nasal spray such as Ocean or Simply Saline to nostrils 2-3 times daily to help soothe tissues and keep mucus thin. Hydrate! (cold or hot based on comfort). Drink lots of water or other non dehydrating liquids to help with illness. May use humidifier in bedroom at night to help with congestion. Hot steamy showers can loosen congestion and help cough. Octaviano's vapor rub to chest can quiet cough. Hand washing-use hand  or wash hands frequently, cover your cough or sneeze, do not share towels or drinks with others. May use Tylenol or Ibuprofen over the counter for pain/comfort if not contraindicated. Follow up in 10-14 days after illness started with primary care if symptoms not complete resolved or sooner if worsening symptoms. Seek immediate care if inability to swallow or breathe or if symptoms improve greatly then worsen again.

## 2023-09-05 NOTE — TELEPHONE ENCOUNTER
Last OV 4/15/23  Last refilled by Dr Chitra Godwin on 3/24/23 for # 90 with 0 refills  Looks like has been refilled while in patient since then  Mom states patient has been out for 2 days  LIOTHYRONINE SOD 5 MCG TAB 08/07/2023 08/05/2023 5 MCG 30 tablet   15 P       Future Appointments   Date Time Provider Joel Chisholm   9/6/2023  8:30 AM ADULT PSYCH PHP - [address]   9/7/2023  8:30 AM ADULT PSYCH PHP - [address]   9/8/2023  8:30 AM ADULT PSYCH PHP - [address]   9/11/2023  8:30 AM ADULT PSYCH PHP - [address]   9/12/2023  8:30 AM ADULT PSYCH PHP - [address]   9/13/2023  8:30 AM ADULT PSYCH PHP - [address]   9/14/2023  8:30 AM ADULT PSYCH PHP - [address]   9/15/2023  8:30 AM ADULT PSYCH PHP - [address]   9/18/2023  8:30 AM ADULT PSYCH PHP - [address]   9/19/2023  8:30 AM ADULT PSYCH PHP - [address]   9/20/2023  8:30 AM ADULT PSYCH PHP - [address]   9/21/2023  8:30 AM ADULT PSYCH PHP - [address]   9/22/2023  8:30 AM ADULT PSYCH PHP - [address]   9/25/2023  8:30 AM ADULT PSYCH PHP - [address] [facility]   9/26/2023  8:30 AM ADULT PSYCH PHP - [address] [facility]        Thank you.

## 2023-09-07 ENCOUNTER — EKG ENCOUNTER (OUTPATIENT)
Dept: LAB | Age: 23
End: 2023-09-07
Attending: PHYSICIAN ASSISTANT
Payer: COMMERCIAL

## 2023-09-07 ENCOUNTER — LAB ENCOUNTER (OUTPATIENT)
Dept: LAB | Age: 23
End: 2023-09-07
Attending: PHYSICIAN ASSISTANT
Payer: COMMERCIAL

## 2023-09-07 DIAGNOSIS — F50.9 EATING DISORDER, UNSPECIFIED TYPE: ICD-10-CM

## 2023-09-07 LAB
ALBUMIN SERPL-MCNC: 3.6 G/DL (ref 3.4–5)
ALBUMIN/GLOB SERPL: 0.8 {RATIO} (ref 1–2)
ALP LIVER SERPL-CCNC: 89 U/L
ALT SERPL-CCNC: 24 U/L
AMYLASE SERPL-CCNC: 15 U/L (ref 25–115)
ANION GAP SERPL CALC-SCNC: 5 MMOL/L (ref 0–18)
AST SERPL-CCNC: 9 U/L (ref 15–37)
BASOPHILS # BLD AUTO: 0.04 X10(3) UL (ref 0–0.2)
BASOPHILS NFR BLD AUTO: 0.5 %
BILIRUB SERPL-MCNC: 0.4 MG/DL (ref 0.1–2)
BUN BLD-MCNC: 12 MG/DL (ref 7–18)
CALCIUM BLD-MCNC: 9.6 MG/DL (ref 8.5–10.1)
CHLORIDE SERPL-SCNC: 103 MMOL/L (ref 98–112)
CO2 SERPL-SCNC: 29 MMOL/L (ref 21–32)
CREAT BLD-MCNC: 0.71 MG/DL
EGFRCR SERPLBLD CKD-EPI 2021: 124 ML/MIN/1.73M2 (ref 60–?)
EOSINOPHIL # BLD AUTO: 0.23 X10(3) UL (ref 0–0.7)
EOSINOPHIL NFR BLD AUTO: 2.8 %
ERYTHROCYTE [DISTWIDTH] IN BLOOD BY AUTOMATED COUNT: 13.6 %
FASTING STATUS PATIENT QL REPORTED: NO
GLOBULIN PLAS-MCNC: 4.5 G/DL (ref 2.8–4.4)
GLUCOSE BLD-MCNC: 100 MG/DL (ref 70–99)
HCT VFR BLD AUTO: 39.2 %
HGB BLD-MCNC: 12.8 G/DL
IMM GRANULOCYTES # BLD AUTO: 0.02 X10(3) UL (ref 0–1)
IMM GRANULOCYTES NFR BLD: 0.2 %
LYMPHOCYTES # BLD AUTO: 2.89 X10(3) UL (ref 1–4)
LYMPHOCYTES NFR BLD AUTO: 34.7 %
MAGNESIUM SERPL-MCNC: 2.1 MG/DL (ref 1.6–2.6)
MCH RBC QN AUTO: 28.1 PG (ref 26–34)
MCHC RBC AUTO-ENTMCNC: 32.7 G/DL (ref 31–37)
MCV RBC AUTO: 86 FL
MONOCYTES # BLD AUTO: 0.35 X10(3) UL (ref 0.1–1)
MONOCYTES NFR BLD AUTO: 4.2 %
NEUTROPHILS # BLD AUTO: 4.81 X10 (3) UL (ref 1.5–7.7)
NEUTROPHILS # BLD AUTO: 4.81 X10(3) UL (ref 1.5–7.7)
NEUTROPHILS NFR BLD AUTO: 57.6 %
OSMOLALITY SERPL CALC.SUM OF ELEC: 284 MOSM/KG (ref 275–295)
PHOSPHATE SERPL-MCNC: 4.2 MG/DL (ref 2.5–4.9)
PLATELET # BLD AUTO: 277 10(3)UL (ref 150–450)
POTASSIUM SERPL-SCNC: 3.9 MMOL/L (ref 3.5–5.1)
PROT SERPL-MCNC: 8.1 G/DL (ref 6.4–8.2)
RBC # BLD AUTO: 4.56 X10(6)UL
SODIUM SERPL-SCNC: 137 MMOL/L (ref 136–145)
T3 SERPL-MCNC: 123 NG/DL (ref 60–181)
T4 FREE SERPL-MCNC: 0.9 NG/DL (ref 0.8–1.7)
TSI SER-ACNC: 0.97 MIU/ML (ref 0.36–3.74)
WBC # BLD AUTO: 8.3 X10(3) UL (ref 4–11)

## 2023-09-07 PROCEDURE — 80053 COMPREHEN METABOLIC PANEL: CPT

## 2023-09-07 PROCEDURE — 84100 ASSAY OF PHOSPHORUS: CPT

## 2023-09-07 PROCEDURE — 83735 ASSAY OF MAGNESIUM: CPT

## 2023-09-07 PROCEDURE — 93005 ELECTROCARDIOGRAM TRACING: CPT

## 2023-09-07 PROCEDURE — 84480 ASSAY TRIIODOTHYRONINE (T3): CPT

## 2023-09-07 PROCEDURE — 85025 COMPLETE CBC W/AUTO DIFF WBC: CPT

## 2023-09-07 PROCEDURE — 82150 ASSAY OF AMYLASE: CPT

## 2023-09-07 PROCEDURE — 93010 ELECTROCARDIOGRAM REPORT: CPT | Performed by: INTERNAL MEDICINE

## 2023-09-07 PROCEDURE — 84439 ASSAY OF FREE THYROXINE: CPT

## 2023-09-07 PROCEDURE — 84443 ASSAY THYROID STIM HORMONE: CPT

## 2023-09-08 LAB
ATRIAL RATE: 75 BPM
P AXIS: 49 DEGREES
P-R INTERVAL: 154 MS
Q-T INTERVAL: 410 MS
QRS DURATION: 90 MS
QTC CALCULATION (BEZET): 457 MS
R AXIS: 17 DEGREES
T AXIS: 24 DEGREES
VENTRICULAR RATE: 75 BPM

## 2023-09-08 RX ORDER — METFORMIN HYDROCHLORIDE 500 MG/1
500 TABLET, EXTENDED RELEASE ORAL 2 TIMES DAILY WITH MEALS
Qty: 60 TABLET | Refills: 0 | Status: SHIPPED | OUTPATIENT
Start: 2023-09-08

## 2023-09-08 RX ORDER — LIOTHYRONINE SODIUM 5 UG/1
10 TABLET ORAL DAILY
Qty: 60 TABLET | Refills: 0 | Status: SHIPPED | OUTPATIENT
Start: 2023-09-08

## 2023-09-08 NOTE — TELEPHONE ENCOUNTER
Mom states patient is out of in patient care and will need refills  Rx pended.   Please advise if okay to place - patient completely out

## 2023-09-08 NOTE — TELEPHONE ENCOUNTER
Pt's mom called and is wondering status of medications for pt. Per mom, pt has been out for a couple days.  Please advise

## 2023-10-03 RX ORDER — METFORMIN HYDROCHLORIDE 500 MG/1
500 TABLET, EXTENDED RELEASE ORAL 2 TIMES DAILY WITH MEALS
Qty: 60 TABLET | Refills: 0 | Status: SHIPPED | OUTPATIENT
Start: 2023-10-03 | End: 2023-10-04

## 2023-10-03 RX ORDER — PROPRANOLOL HYDROCHLORIDE 20 MG/1
20 TABLET ORAL 2 TIMES DAILY
Qty: 30 TABLET | Refills: 0 | OUTPATIENT
Start: 2023-10-03

## 2023-10-03 NOTE — TELEPHONE ENCOUNTER
Last read by Napoleon Nova at  8:47 AM on 10/3/2023.      Future Appointments   Date Time Provider Joel Chisholm   10/5/2023 12:00 PM JOSÉ Cook [facility]   10/9/2023  1:00 PM Crissy [address], [address], [place] [address]   10/16/2023  [facility] [place]   10/23/2023  1:00 PM [facility] [place] [facility] [place]   11/6/2023  [facility] [place]   11/13/2023  1:00 PM Anderson [facility] [place]   11/20/2023  1:00 PM [facility] [place]   11/27/2023  1:00 PM Della, [address], [place] [address]

## 2023-10-03 NOTE — TELEPHONE ENCOUNTER
Diabetic Medication Protocol Fnxfyd78/02/2023 06:48 PM   Protocol Details Microalbumin procedure in past 12 months or taking ACE/ARB    HgBA1C procedure resulted in past 6 months    Last HgBA1C < 7.5    Appointment in past 6 or next 3 months       We have never prescribed propranolol  Refill denied      Last OV 4/15/23  Last refill metformin 9/8/23 60 0 refill  Due for Px  [facility] sent  Future Appointments   Date Time Provider Joel Chisholm   10/5/2023 12:00 PM JOSÉ Ramos [facility] [place]   10/9/2023  1:00 PM Sharyon [facility] [place]   10/16/2023  1:00 PM Sharcelena [facility] [place]   10/23/2023  1:00 PM Sharcelnea [facility] [place]   11/6/2023  1:00 PM Sharcelena [facility] [place]   11/13/2023  1:00 PM [facility] [place]   11/20/2023  1:00 PM [facility] [place]   11/27/2023  1:00 PM Aydee Hull, [place]

## 2023-10-04 RX ORDER — METFORMIN HYDROCHLORIDE 500 MG/1
500 TABLET, EXTENDED RELEASE ORAL 2 TIMES DAILY WITH MEALS
Qty: 60 TABLET | Refills: 0 | Status: SHIPPED | OUTPATIENT
Start: 2023-10-04

## 2023-10-04 NOTE — TELEPHONE ENCOUNTER
Future Appointments   Date Time Provider Joel Chisholm   10/5/2023 12:00 PM JOSÉ Jeffries [facility] [place]   10/9/2023  1:00 PM Crissy [address], [address], [place] [address]   10/16/2023  1:00 PM [facility] [place]   10/23/2023  9:20 AM JOSÉ Thurston [facility] [place]   10/23/2023  1:00 PM [facility] [place]   11/6/2023  [facility] [place]   11/13/2023  1:00 [facility] [place]   11/20/2023  1:00 [facility] [place]   11/27/2023  1:00 [facility] [place]     Pt scheduled px with MILDRED Brewer,    Will need a Bridging [facility]

## 2023-10-07 RX ORDER — PROPRANOLOL HYDROCHLORIDE 20 MG/1
20 TABLET ORAL 2 TIMES DAILY
Qty: 30 TABLET | Refills: 0 | OUTPATIENT
Start: 2023-10-07

## 2023-10-07 RX ORDER — LIOTHYRONINE SODIUM 5 UG/1
10 TABLET ORAL DAILY
Qty: 60 TABLET | Refills: 0 | Status: SHIPPED | OUTPATIENT
Start: 2023-10-07

## 2023-10-07 NOTE — TELEPHONE ENCOUNTER
LOV 4/15/23 for an ER f/u. Refused 4 days ago (10/3/2023):   Prescriber not at this practice   PROPRANOLOL 20 MG Oral Tab   Sig: TAKE 1 TABLET BY MOUTH 2 TIMES DAILY FOR 15 DAYS. Disp: 30 tablet    Refills: 0   Received from: Pharmacy   Encounter Details     Propranolol refused. Thyroid Supplements Protocol Qzvjmm93/07/2023 11:08 AM   Protocol Details TSH test in past 12 months    TSH value between 0.350 and 5.500 IU/ml    Appointment in past 12 or next 3 months        Thyroid med sent.   Future Appointments   Date Time Provider Joel Chisholm   10/9/2023  1:00 PM Galdino Purple [facility] [place]   10/16/2023  1:00 PM Galdino Purple [facility] [place]   10/23/2023  9:20 AM JOSÉ Pendleton EMG[facility] [place]   10/23/2023  1:00 PM Galdino Purple [facility] [place]   11/6/2023  1:00 PM Galdino Purple [facility] [place]   11/13/2023  1:00 PM Galdino Purple [facility] [place]   11/20/2023  1:00 PM Galdino Purple [facility] [place]   11/27/2023  1:00 PM Aydee Hull, [place] [address]

## 2023-10-09 RX ORDER — PRAVASTATIN SODIUM 20 MG
TABLET ORAL
Qty: 90 TABLET | Refills: 0 | Status: SHIPPED | OUTPATIENT
Start: 2023-10-09

## 2023-10-09 RX ORDER — PROPRANOLOL HYDROCHLORIDE 20 MG/1
20 TABLET ORAL 2 TIMES DAILY
Qty: 30 TABLET | Refills: 0 | OUTPATIENT
Start: 2023-10-09

## 2023-10-09 NOTE — TELEPHONE ENCOUNTER
Hypertension Medications Protocol Mikmsc74/09/2   holesterol Medication Protocol Passed   Propranolol not filled by our office-refused  Pravastatin last refilled    PRAVASTATIN SODIUM 20 MG TAB 09/14/2023 03/10/2023  30 each  27 Kenneth Pablo [id number] ... Dispensed Written Strength Quantity Refills Days Supply Provider Pharmacy    PROPRANOLOL 20 MG TABLET 09/02/2023 08/22/2023  30 each  15 Ana Maria Phan [id number] ...

## 2023-10-11 ENCOUNTER — TELEPHONE (OUTPATIENT)
Dept: FAMILY MEDICINE CLINIC | Facility: CLINIC | Age: 23
End: 2023-10-11

## 2023-10-11 RX ORDER — PROPRANOLOL HYDROCHLORIDE 10 MG/1
10 TABLET ORAL DAILY
Qty: 30 TABLET | Refills: 2 | Status: SHIPPED | OUTPATIENT
Start: 2023-10-11

## 2023-10-11 NOTE — TELEPHONE ENCOUNTER
Called mom to discuss.   Initially prescribed by: Unknown (possibly Lenard Rose)  Reason: heart rate fluctuation and unstable BP  Dose: Mom thinks it is 10mg daily    Future Appointments   Date Time Provider Joel Chisholm   10/12/2023  1:00 PM JOSÉ [facility] [place]   10/16/2023  1:00 PM Crissy [address], [address], [place] [address]   10/23/2023  9:20 AM JOSÉ Saez [facility] [place]   10/23/2023  [facility] [place]   11/6/2023  1:00 PM [facility] [place]   11/13/2023  1:00 PM [facility] [place]   11/20/2023  1:00 PM [facility] [place]   11/27/2023  1:00 PM [place], [address], [place] [address]

## 2023-10-11 NOTE — TELEPHONE ENCOUNTER
I can fill it for her but need more information  Who initially prescribed it? Indication? Dose? How long has she been on it?

## 2023-10-11 NOTE — TELEPHONE ENCOUNTER
Pt's mom called and states when she scheduled px for pt, she was told there would be no issues getting refills for metformin and propanolol. Per pt's mom, they received metformin but propranolol has been denied. Pt's mom wondering why it is being denied and if it can be sent to     [address]     Pt's mom requesting a call back if rx is ordered.  Please advise

## 2023-10-11 NOTE — TELEPHONE ENCOUNTER
The Metformin was sent on 10/4/23. Do you want to manage the Propranolol? If so, we need to confirm if pt is taking 10mg or 20mg because there are several doses in her chart.     Future Appointments   Date Time Provider Joel Chisholm   10/12/2023  1:00 PM JOSÉ Dill [facility] [place]   10/16/2023  1:00 PM Aydee Dean, [place] [address]   10/23/2023  9:20 AM JOSÉ Mi [facility] [place]   10/23/2023  1:00 PM [facility] [place]   11/6/2023  1:00 PM [facility] [place]   11/13/2023  1:00 PM [facility] [place]   11/20/2023  1:00 PM [facility] [place]   11/27/2023  1:00 PM Aydee Hull, [place] [address]

## 2023-10-14 RX ORDER — PRAVASTATIN SODIUM 20 MG
20 TABLET ORAL NIGHTLY
Qty: 90 TABLET | Refills: 0 | Status: SHIPPED | OUTPATIENT
Start: 2023-10-14

## 2023-10-14 NOTE — TELEPHONE ENCOUNTER
LOV: 4/15/23 tahycardia    PRAVASTATIN 20 MG Oral Tab  TAKE 1 TABLET BY MOUTH EVERY DAY AT NIGHT Dispense: 90 tablet, Refills: 0 ordered       10/09/2023     Future Appointments   Date Time Provider Joel Chisholm   10/16/2023  1:00 PM [facility] [place]   10/23/2023  9:20 AM JOSÉ Lanier [facility] [place]   10/23/2023  1:00 [facility] [place]   10/26/2023  2:00 PM JOSÉ Cook [facility] [place]   11/6/2023  1:00 PM [facility] [place]   11/13/2023  1:00 PM [facility] [place]   11/20/2023  1:00 PM [place] [facility] [place]   11/27/2023  1:00 PM Aydee Hull, [place] [address]

## 2023-10-23 ENCOUNTER — OFFICE VISIT (OUTPATIENT)
Dept: FAMILY MEDICINE CLINIC | Facility: CLINIC | Age: 23
End: 2023-10-23
Payer: COMMERCIAL

## 2023-10-23 ENCOUNTER — LAB ENCOUNTER (OUTPATIENT)
Dept: LAB | Age: 23
End: 2023-10-23
Attending: NURSE PRACTITIONER

## 2023-10-23 VITALS
HEART RATE: 78 BPM | OXYGEN SATURATION: 100 % | SYSTOLIC BLOOD PRESSURE: 112 MMHG | WEIGHT: 293 LBS | HEIGHT: 64 IN | TEMPERATURE: 97 F | BODY MASS INDEX: 50.02 KG/M2 | DIASTOLIC BLOOD PRESSURE: 70 MMHG

## 2023-10-23 DIAGNOSIS — F31.4 SEVERE DEPRESSED BIPOLAR I DISORDER WITHOUT PSYCHOTIC FEATURES (HCC): ICD-10-CM

## 2023-10-23 DIAGNOSIS — G89.29 CHRONIC JOINT PAIN: ICD-10-CM

## 2023-10-23 DIAGNOSIS — I10 HYPERTENSION, UNSPECIFIED TYPE: ICD-10-CM

## 2023-10-23 DIAGNOSIS — F50.9 EATING DISORDER, UNSPECIFIED TYPE: ICD-10-CM

## 2023-10-23 DIAGNOSIS — E78.5 HYPERLIPIDEMIA, UNSPECIFIED HYPERLIPIDEMIA TYPE: ICD-10-CM

## 2023-10-23 DIAGNOSIS — E66.01 MORBID OBESITY (HCC): ICD-10-CM

## 2023-10-23 DIAGNOSIS — F60.3 BORDERLINE PERSONALITY DISORDER (HCC): ICD-10-CM

## 2023-10-23 DIAGNOSIS — F84.0 AUTISM SPECTRUM DISORDER: ICD-10-CM

## 2023-10-23 DIAGNOSIS — I42.9 FAMILIAL CARDIOMYOPATHY (HCC): ICD-10-CM

## 2023-10-23 DIAGNOSIS — F41.1 GAD (GENERALIZED ANXIETY DISORDER): ICD-10-CM

## 2023-10-23 DIAGNOSIS — E78.00 HYPERCHOLESTEREMIA: ICD-10-CM

## 2023-10-23 DIAGNOSIS — E55.9 VITAMIN D DEFICIENCY: ICD-10-CM

## 2023-10-23 DIAGNOSIS — F43.10 PTSD (POST-TRAUMATIC STRESS DISORDER): ICD-10-CM

## 2023-10-23 DIAGNOSIS — Z00.00 ANNUAL PHYSICAL EXAM: ICD-10-CM

## 2023-10-23 DIAGNOSIS — R73.03 PREDIABETES: ICD-10-CM

## 2023-10-23 DIAGNOSIS — F33.2 MDD (MAJOR DEPRESSIVE DISORDER), RECURRENT SEVERE, WITHOUT PSYCHOSIS (HCC): ICD-10-CM

## 2023-10-23 DIAGNOSIS — F90.2 ATTENTION DEFICIT HYPERACTIVITY DISORDER (ADHD), COMBINED TYPE: ICD-10-CM

## 2023-10-23 DIAGNOSIS — J35.1 TONSILLAR ENLARGEMENT: ICD-10-CM

## 2023-10-23 DIAGNOSIS — M25.50 CHRONIC JOINT PAIN: ICD-10-CM

## 2023-10-23 DIAGNOSIS — Z23 NEED FOR VACCINATION: ICD-10-CM

## 2023-10-23 DIAGNOSIS — Z00.00 ANNUAL PHYSICAL EXAM: Primary | ICD-10-CM

## 2023-10-23 PROBLEM — Z87.898 HISTORY OF SEXUAL VIOLENCE: Status: ACTIVE | Noted: 2023-10-23

## 2023-10-23 LAB
CHOLEST SERPL-MCNC: 227 MG/DL (ref ?–200)
CRP SERPL-MCNC: 1.88 MG/DL (ref ?–0.3)
ERYTHROCYTE [SEDIMENTATION RATE] IN BLOOD: 47 MM/HR
EST. AVERAGE GLUCOSE BLD GHB EST-MCNC: 114 MG/DL (ref 68–126)
FASTING PATIENT LIPID ANSWER: YES
HBA1C MFR BLD: 5.6 % (ref ?–5.7)
HDLC SERPL-MCNC: 46 MG/DL (ref 40–59)
LDLC SERPL CALC-MCNC: 154 MG/DL (ref ?–100)
NONHDLC SERPL-MCNC: 181 MG/DL (ref ?–130)
RHEUMATOID FACT SERPL-ACNC: <10 IU/ML (ref ?–15)
TRIGL SERPL-MCNC: 151 MG/DL (ref 30–149)
VLDLC SERPL CALC-MCNC: 29 MG/DL (ref 0–30)

## 2023-10-23 PROCEDURE — 86038 ANTINUCLEAR ANTIBODIES: CPT | Performed by: NURSE PRACTITIONER

## 2023-10-23 PROCEDURE — 86431 RHEUMATOID FACTOR QUANT: CPT | Performed by: NURSE PRACTITIONER

## 2023-10-23 PROCEDURE — 85652 RBC SED RATE AUTOMATED: CPT | Performed by: NURSE PRACTITIONER

## 2023-10-23 PROCEDURE — 83036 HEMOGLOBIN GLYCOSYLATED A1C: CPT | Performed by: NURSE PRACTITIONER

## 2023-10-23 PROCEDURE — 80061 LIPID PANEL: CPT | Performed by: NURSE PRACTITIONER

## 2023-10-23 PROCEDURE — 86225 DNA ANTIBODY NATIVE: CPT | Performed by: NURSE PRACTITIONER

## 2023-10-23 PROCEDURE — 86140 C-REACTIVE PROTEIN: CPT | Performed by: NURSE PRACTITIONER

## 2023-10-23 RX ORDER — PROPRANOLOL HYDROCHLORIDE 20 MG/1
20 TABLET ORAL 2 TIMES DAILY
Qty: 180 TABLET | Refills: 0 | Status: SHIPPED | OUTPATIENT
Start: 2023-10-23 | End: 2024-01-21

## 2023-10-23 RX ORDER — PRAVASTATIN SODIUM 20 MG
20 TABLET ORAL NIGHTLY
Qty: 90 TABLET | Refills: 0 | Status: SHIPPED | OUTPATIENT
Start: 2023-10-23

## 2023-10-24 ENCOUNTER — TELEPHONE (OUTPATIENT)
Dept: FAMILY MEDICINE CLINIC | Facility: CLINIC | Age: 23
End: 2023-10-24

## 2023-10-24 DIAGNOSIS — E78.5 HYPERLIPIDEMIA, UNSPECIFIED HYPERLIPIDEMIA TYPE: Primary | ICD-10-CM

## 2023-10-24 LAB
DSDNA IGG SERPL IA-ACNC: 1.1 IU/ML
ENA AB SER QL IA: <0.09 UG/L
ENA AB SER QL IA: NEGATIVE

## 2023-10-24 NOTE — TELEPHONE ENCOUNTER
Needs to be back on meds for at least 6 weeks before we should recheck  I don't see that she has a BP recheck visit scheduled.  Should schedule that

## 2023-10-24 NOTE — TELEPHONE ENCOUNTER
----- Message from JOSÉ Baltazar sent at 10/24/2023  8:10 AM CDT -----  Mildly elevated CRP. I would recommend seeing Rheumatology for further investigation into joint pain. Refer to Dr. Merly Perez  Cholesterol elevated. Has patient been taking Pravastatin regularly?  If so, needs dose adjustment  A1C normal range  Rheumatoid factor negative, SHARRI pending

## 2023-10-25 ENCOUNTER — TELEPHONE (OUTPATIENT)
Dept: FAMILY MEDICINE CLINIC | Facility: CLINIC | Age: 23
End: 2023-10-25

## 2023-10-25 NOTE — TELEPHONE ENCOUNTER
At patient's appt they inquired about seeing specialist to start testosterone/gender affirming care. Please let them know I reached out to our endocrinologist. She stated that she will see patients who have already transitioned and need surveillance of their hormone therapy but they did not initiate. Endocrinologist recommended an [facility] center for this, such as [facility] or [facility] in [facility]. Please let patient know. Could also send in [employer] message. Thanks!

## 2023-10-25 NOTE — TELEPHONE ENCOUNTER
Agree  Can also check insurance coverage for providers in-network but outside of the [facility] system.

## 2023-10-26 NOTE — ED NOTES
Xuan Correa RN regarding the transfer Dermal Autograft Text: The defect edges were debeveled with a #15 scalpel blade.  Given the location of the defect, shape of the defect and the proximity to free margins a dermal autograft was deemed most appropriate.  Using a sterile surgical marker, the primary defect shape was transferred to the donor site. The area thus outlined was incised deep to adipose tissue with a #15 scalpel blade.  The harvested graft was then trimmed of adipose and epidermal tissue until only dermis was left.  The skin graft was then placed in the primary defect and oriented appropriately.

## 2023-11-01 RX ORDER — LIOTHYRONINE SODIUM 5 UG/1
10 TABLET ORAL DAILY
Qty: 60 TABLET | Refills: 0 | Status: SHIPPED | OUTPATIENT
Start: 2023-11-01

## 2023-11-01 NOTE — TELEPHONE ENCOUNTER
Thyroid Supplements Protocol Wgumzy7711/01/2023 12:38 AM   Protocol Details TSH test in past 12 months    TSH value between 0.350 and 5.500 IU/ml    Appointment in past 12 or next 3 months   Refilled per protocol  LIOTHYRONINE 5 MCG Oral Tab   Last refilled on 10/7/23 #60 with 0 rf.   LOV- 10/23/23  Last labs- 10/23/23    Sent to pharmacy

## 2023-11-09 ENCOUNTER — NURSE ONLY (OUTPATIENT)
Dept: FAMILY MEDICINE CLINIC | Facility: CLINIC | Age: 23
End: 2023-11-09
Payer: COMMERCIAL

## 2023-11-09 ENCOUNTER — TELEPHONE (OUTPATIENT)
Dept: FAMILY MEDICINE CLINIC | Facility: CLINIC | Age: 23
End: 2023-11-09

## 2023-11-09 VITALS — SYSTOLIC BLOOD PRESSURE: 112 MMHG | DIASTOLIC BLOOD PRESSURE: 72 MMHG

## 2023-11-09 PROCEDURE — 3078F DIAST BP <80 MM HG: CPT | Performed by: NURSE PRACTITIONER

## 2023-11-09 PROCEDURE — 3074F SYST BP LT 130 MM HG: CPT | Performed by: NURSE PRACTITIONER

## 2023-11-09 NOTE — PROGRESS NOTES
Patient here for BP check  Per patient readings at home have been good  Systolic ranging from   Diastolic ranging from 32-41    BP in office today 112/72 with thigh cuff    Patient states feeling well  No side effects    Advised will let Ligia know     Left office in stable condition.

## 2023-11-09 NOTE — TELEPHONE ENCOUNTER
Patient here for BP check  Per patient readings at home have been good  Systolic ranging from   Diastolic ranging from 01-65    BP in office today 112/72 with thigh cuff    Patient states feeling well  No side effects    Advised will let Ligia know     Left office in stable condition.

## 2023-11-13 ENCOUNTER — TELEPHONE (OUTPATIENT)
Dept: FAMILY MEDICINE CLINIC | Facility: CLINIC | Age: 23
End: 2023-11-13

## 2023-11-16 ENCOUNTER — EXTERNAL RECORD (OUTPATIENT)
Dept: HEALTH INFORMATION MANAGEMENT | Facility: OTHER | Age: 23
End: 2023-11-16

## 2023-11-20 ENCOUNTER — EXTERNAL RECORD (OUTPATIENT)
Dept: HEALTH INFORMATION MANAGEMENT | Facility: OTHER | Age: 23
End: 2023-11-20

## 2023-11-21 ENCOUNTER — OFFICE VISIT (OUTPATIENT)
Facility: LOCATION | Age: 23
End: 2023-11-21
Payer: COMMERCIAL

## 2023-11-21 DIAGNOSIS — J35.3 TONSILLAR AND ADENOID HYPERTROPHY: Primary | ICD-10-CM

## 2023-11-21 DIAGNOSIS — G47.33 OBSTRUCTIVE SLEEP APNEA SYNDROME: ICD-10-CM

## 2023-11-21 PROCEDURE — 99203 OFFICE O/P NEW LOW 30 MIN: CPT | Performed by: OTOLARYNGOLOGY

## 2023-11-22 NOTE — PROGRESS NOTES
Uvaldo Andres is a 26year old adult. Chief Complaint   Patient presents with    Tonsil Problem     HPI:   Patient has a history of swollen tonsils. The patient does not get recurrent tonsillitis. The patient has snoring and concerns for sleep apnea and tiredness. Current Outpatient Medications   Medication Sig Dispense Refill    LIOTHYRONINE 5 MCG Oral Tab TAKE 2 TABLETS BY MOUTH EVERY DAY 60 tablet 0    hydrOXYzine 50 MG Oral Tab Take 1 tablet (50 mg total) by mouth 3 (three) times daily as needed for Anxiety. 90 tablet 0    clomiPRAMINE 50 MG Oral Cap Take 2 capsules (100 mg total) by mouth at bedtime. 60 capsule 0    propranolol 20 MG Oral Tab Take 1 tablet (20 mg total) by mouth 2 (two) times daily. 180 tablet 0    pravastatin 20 MG Oral Tab Take 1 tablet (20 mg total) by mouth nightly. 90 tablet 0    Probiotic Product (DIGESTIVE ADVANTAGE GUMMIES) Oral Chew Tab       trihexyphenidyl 2 MG Oral Tab Take 2 tablets (4 mg total) by mouth 3 (three) times daily with meals. 180 tablet 1    OXcarbazepine 600 MG Oral Tab Take 1 tablet (600 mg total) by mouth 2 (two) times daily. 60 tablet 1    naltrexone 50 MG Oral Tab Take 2 tablets (100 mg total) by mouth at bedtime. 60 tablet 1    lurasidone 60 MG Oral Tab Take 1 tablet (60 mg total) by mouth at bedtime. 30 tablet 1    gabapentin 400 MG Oral Cap Take 1 capsule (400 mg total) by mouth nightly. 30 capsule 1    metFORMIN  MG Oral Tablet 24 Hr Take 1 tablet (500 mg total) by mouth 2 (two) times daily with meals. 60 tablet 0    cholecalciferol 125 MCG (5000 UT) Oral Tab Take 6,000 Units by mouth daily. 30 tablet 0    ibuprofen 600 MG Oral Tab Take 1 tablet (600 mg total) by mouth every 6 (six) hours as needed for Pain. Ferrous Sulfate (IRON) 325 (65 Fe) MG Oral Tab Take by mouth.  Twice a day        Past Medical History:   Diagnosis Date    ADHD (attention deficit hyperactivity disorder)     Anemia     Anxiety     Arthritis     Autism 2020 Borderline personality disorder ([facility] [address]     Depression     Essential hypertension     Hyperlipidemia     Obesity     Pre-diabetes     PTSD (post-traumatic stress disorder)     Suicidal behavior with attempted self-injury ([facility] [address] 2021    Tachycardia       Social History:  Social History     Socioeconomic History    Marital status: Single   Tobacco Use    Smoking status: Never     Passive exposure: Never    Smokeless tobacco: Never    Tobacco comments:     non-smoker   Vaping Use    Vaping Use: Never used   Substance and Sexual Activity    Alcohol use: No    Drug use: No      History reviewed. No pertinent surgical history. REVIEW OF SYSTEMS:   GENERAL HEALTH: feels well otherwise  GENERAL : denies fever, chills, sweats, weight loss, weight gain  SKIN: denies any unusual skin lesions or rashes  RESPIRATORY: denies shortness of breath with exertion  NEURO: denies headaches    EXAM:   LMP 10/07/2023 (Exact Date)     System Findings Details   Constitutional  Overall appearance - Normal.   Psychiatric  Orientation - Oriented to time, place, person & situation. Appropriate mood and affect. Head/Face  Facial features -- Normal. Skull - Normal.   Eyes  Pupils equal ,round ,react to light and accomidate   Ears, Nose, Throat, Neck  Ears clear nose congestion oropharynx +3-4 tonsils neck no masses   Neurological  Memory - Normal. Cranial nerves - Cranial nerves II through XII grossly intact. Lymph Detail  Submental. Submandibular. Anterior cervical. Posterior cervical. Supraclavicular. ASSESSMENT AND PLAN:   1. Tonsillar and adenoid hypertrophy  This could be contributing to sleep apnea. - At Home Sleep Test - Non-Medicare    2. Obstructive sleep apnea syndrome  The patient will undergo a dedicated sleep study. The patient will then consider following up with a sleep specialist.  Colectomy and adenoidectomy may be needed in the treatment process.   - At Home Sleep Test - Non-Medicare      The patient indicates understanding of these issues and agrees to the plan. No follow-ups on file.     Hugo Rogel MD  11/21/2023  6:01 PM

## 2023-11-27 RX ORDER — METFORMIN HYDROCHLORIDE 500 MG/1
500 TABLET, EXTENDED RELEASE ORAL 2 TIMES DAILY WITH MEALS
Qty: 180 TABLET | Refills: 1 | Status: SHIPPED | OUTPATIENT
Start: 2023-11-27 | End: 2024-02-25

## 2023-11-27 NOTE — TELEPHONE ENCOUNTER
LOV 10/23/23 with Ligia for a px.     Diabetic Medication Protocol Atoivm4611/26/2023 07:39 AM   Protocol Details Microalbumin procedure in past 12 months or taking ACE/ARB    HgBA1C procedure resulted in past 6 months    Last HgBA1C < 7.5    Appointment in past 6 or next 3 months     Future Appointments   Date Time Provider Joel Chisholm   12/4/2023  1:00 PM [place] [address], [address], [place] [address]   12/5/2023  3:00 PM JOSÉ Brewster [facility]   2/15/2024  4:00 PM Rhonda De La Cruz MD [facility] EM[facility] [address]

## 2023-12-04 ENCOUNTER — TELEPHONE (OUTPATIENT)
Dept: FAMILY MEDICINE CLINIC | Facility: CLINIC | Age: 23
End: 2023-12-04

## 2023-12-07 RX ORDER — LIOTHYRONINE SODIUM 5 UG/1
10 TABLET ORAL DAILY
Qty: 60 TABLET | Refills: 0 | Status: SHIPPED | OUTPATIENT
Start: 2023-12-07

## 2023-12-07 NOTE — TELEPHONE ENCOUNTER
LOV: 10/23/23 for physical      LIOTHYRONINE 5 MCG Oral Tab  TAKE 2 TABLETS BY MOUTH EVERY DAY Dispense: 60 tablet, Refills: 0 ordered       11/01/2023     Future Appointments   Date Time Provider Joel Chisholm   12/18/2023  1:00 PM Renetta VEGA [facility] [place]   12/26/2023  2:30 PM JOSÉ Mcintosh [facility]   2/15/2024  4:00 PM Kathy Miramontes MD [facility] EMG [address]       Thyroid Supplements Protocol Ggkjhg4312/07/2023 03:56 PM   Protocol Details TSH test in past 12 months    TSH value between 0.350 and 5.500 IU/ml    Appointment in past 12 or next 3 months

## 2023-12-11 ENCOUNTER — ORDER TRANSCRIPTION (OUTPATIENT)
Dept: SLEEP CENTER | Age: 23
End: 2023-12-11

## 2023-12-11 DIAGNOSIS — R00.0 TACHYCARDIA: ICD-10-CM

## 2023-12-11 DIAGNOSIS — R73.03 PREDIABETES: ICD-10-CM

## 2023-12-11 DIAGNOSIS — I10 HTN (HYPERTENSION): ICD-10-CM

## 2023-12-11 DIAGNOSIS — E66.9 OBESITY: ICD-10-CM

## 2023-12-11 DIAGNOSIS — R06.81 APNEA: ICD-10-CM

## 2023-12-11 DIAGNOSIS — R06.83 SNORING: Primary | ICD-10-CM

## 2023-12-12 PROBLEM — F32.A DEPRESSION WITH SUICIDAL IDEATION: Status: ACTIVE | Noted: 2023-12-12

## 2023-12-12 PROBLEM — U07.1 COVID-19: Status: ACTIVE | Noted: 2023-12-12

## 2023-12-12 PROBLEM — R45.851 DEPRESSION WITH SUICIDAL IDEATION: Status: ACTIVE | Noted: 2023-12-12

## 2023-12-13 ENCOUNTER — APPOINTMENT (OUTPATIENT)
Dept: GENERAL RADIOLOGY | Facility: HOSPITAL | Age: 23
End: 2023-12-13
Attending: INTERNAL MEDICINE
Payer: COMMERCIAL

## 2023-12-13 PROCEDURE — 71045 X-RAY EXAM CHEST 1 VIEW: CPT | Performed by: INTERNAL MEDICINE

## 2023-12-14 PROBLEM — F33.2 MAJOR DEPRESSIVE DISORDER, RECURRENT SEVERE WITHOUT PSYCHOTIC FEATURES (HCC): Status: ACTIVE | Noted: 2023-12-14

## 2023-12-15 PROBLEM — F32.A DEPRESSION WITH SUICIDAL IDEATION: Status: RESOLVED | Noted: 2023-12-12 | Resolved: 2023-12-15

## 2023-12-15 PROBLEM — R45.851 DEPRESSION WITH SUICIDAL IDEATION: Status: RESOLVED | Noted: 2023-12-12 | Resolved: 2023-12-15

## 2023-12-18 ENCOUNTER — PATIENT OUTREACH (OUTPATIENT)
Dept: CASE MANAGEMENT | Age: 23
End: 2023-12-18

## 2023-12-20 ENCOUNTER — TELEPHONE (OUTPATIENT)
Dept: FAMILY MEDICINE CLINIC | Facility: CLINIC | Age: 23
End: 2023-12-20

## 2023-12-20 ENCOUNTER — OFFICE VISIT (OUTPATIENT)
Dept: FAMILY MEDICINE CLINIC | Facility: CLINIC | Age: 23
End: 2023-12-20
Payer: COMMERCIAL

## 2023-12-20 VITALS
BODY MASS INDEX: 50.02 KG/M2 | DIASTOLIC BLOOD PRESSURE: 70 MMHG | TEMPERATURE: 97 F | WEIGHT: 293 LBS | SYSTOLIC BLOOD PRESSURE: 112 MMHG | HEIGHT: 64 IN | OXYGEN SATURATION: 96 % | HEART RATE: 84 BPM | RESPIRATION RATE: 20 BRPM

## 2023-12-20 DIAGNOSIS — J02.9 SORE THROAT: Primary | ICD-10-CM

## 2023-12-20 DIAGNOSIS — J04.0 LARYNGITIS: ICD-10-CM

## 2023-12-20 PROCEDURE — 3078F DIAST BP <80 MM HG: CPT | Performed by: PHYSICIAN ASSISTANT

## 2023-12-20 PROCEDURE — 3008F BODY MASS INDEX DOCD: CPT | Performed by: PHYSICIAN ASSISTANT

## 2023-12-20 PROCEDURE — 99213 OFFICE O/P EST LOW 20 MIN: CPT | Performed by: PHYSICIAN ASSISTANT

## 2023-12-20 PROCEDURE — 87880 STREP A ASSAY W/OPTIC: CPT | Performed by: PHYSICIAN ASSISTANT

## 2023-12-20 PROCEDURE — 3074F SYST BP LT 130 MM HG: CPT | Performed by: PHYSICIAN ASSISTANT

## 2023-12-20 RX ORDER — METHYLPREDNISOLONE 4 MG/1
TABLET ORAL
Qty: 21 EACH | Refills: 0 | Status: SHIPPED | OUTPATIENT
Start: 2023-12-20

## 2023-12-20 NOTE — TELEPHONE ENCOUNTER
Patient should be seen. Recommend [address]  Patient was hospitalized last week.  May have new infection

## 2023-12-20 NOTE — TELEPHONE ENCOUNTER
Pt's mom called and states pt had tested positive for covid on 11/27 and tested negative on 12/05. Per pt's mom, they tested positive again on 12/11 and was negative on 12/15. Pt's mom states that pt has a lingering scratchy voice that is just not seeming to get better. Pt's mom wondering what remedies pt can do to help.  Please advise

## 2023-12-20 NOTE — TELEPHONE ENCOUNTER
Tea with honey/vocal rest?  OV to discuss?     Future Appointments   Date Time Provider Joel Chisholm   12/26/2023  2:30 PM JOSÉ Muñoz [facility]   2/6/2024  3:00 PM [facility]   2/15/2024  4:00 PM Lauren Wood MD [facility] EM[facility] [address]

## 2024-01-03 NOTE — TELEPHONE ENCOUNTER
Fax from [facility] requesting refill  No future appt with pcp  LOV with MANUELA 10/23/23  Protocol: none  Routed to pcp to advise

## 2024-01-04 PROBLEM — E03.9 ACQUIRED HYPOTHYROIDISM: Status: ACTIVE | Noted: 2024-01-04

## 2024-01-04 RX ORDER — LIOTHYRONINE SODIUM 5 UG/1
10 TABLET ORAL DAILY
Qty: 180 TABLET | Refills: 0 | Status: SHIPPED | OUTPATIENT
Start: 2024-01-04 | End: 2024-04-03

## 2024-01-04 NOTE — TELEPHONE ENCOUNTER
Mom says they will be canceling this endo apt  - this was scheduled for consultation on starting testosterone  Patient is going to put that on hold for now according to mom  Liothyrnonine was initially prescribed at inpatient behavioral health stay for hypothyroidism  Mom is requesting refill for patient

## 2024-01-04 NOTE — TELEPHONE ENCOUNTER
I am not sure why patient is this medication. I cannot get clear answer in chart  Looks like patient will be seeing endocrinology next week. Do they have enough to get to that appt and then have endo take over?

## 2024-02-06 ENCOUNTER — OFFICE VISIT (OUTPATIENT)
Dept: SLEEP CENTER | Age: 24
End: 2024-02-06
Attending: NURSE PRACTITIONER
Payer: COMMERCIAL

## 2024-02-06 DIAGNOSIS — I10 HTN (HYPERTENSION): ICD-10-CM

## 2024-02-06 DIAGNOSIS — R06.83 SNORING: ICD-10-CM

## 2024-02-06 DIAGNOSIS — R73.03 PREDIABETES: ICD-10-CM

## 2024-02-06 DIAGNOSIS — E66.9 OBESITY: ICD-10-CM

## 2024-02-06 DIAGNOSIS — R00.0 TACHYCARDIA: ICD-10-CM

## 2024-02-06 DIAGNOSIS — R06.81 APNEA: ICD-10-CM

## 2024-02-06 PROCEDURE — 95806 SLEEP STUDY UNATT&RESP EFFT: CPT

## 2024-02-08 NOTE — PROCEDURES
[facility]  Accredited by the American Academy of Sleep Medicine (AASM)    PATIENT'S NAME: DARBY TAMAYO   ATTENDING PHYSICIAN: Jarett Irving MD   REFERRING PHYSICIAN: JOSÉ Lennon   PATIENT ACCOUNT #: [id number] LOCATION: Sleep Center   MEDICAL RECORD #: [id number] YOB: 2000   DATE OF STUDY: 02/06/2024       SLEEP STUDY REPORT    STUDY TYPE:  Home sleep test.      ORDERING PROVIDER:  JOSÉ Coleman.    INDICATION:  Suspected obstructive sleep apnea (ICD-10 code G47.33) in patient with difficulty initiating and maintaining sleep, snoring, nocturnal awakenings, daytime fatigue, and a body mass index 59.6.    RESULTS:  The patient underwent home sleep test with measurement of the nasal airflow, nasal air pressure, snoring, chest and abdominal wall motion, oximetry, and body position.  I have reviewed the entirety of the raw data of this study.  During this study, the total recording time is 584 minutes.  The lights-out clock time is 12:14 a.m., the lights-on clock time is 9:58 a.m.  The apnea plus hypopnea index is 8.6 events per hour.  The supine apnea plus hypopnea index is 23.3 events per hour.  The average oxygen saturation is 97%, the lowest oxygen saturation is 88%, and the patient spent 0.7% of the testing with saturations 88% or less.  The average heart rate was 75 beats per minute, and the patient spent approximately 30% of the test in the supine position.    INTERPRETATION:  The data generated from this study is consistent with mild obstructive sleep apnea which becomes moderately severe in the supine position (ICD-10 code G47.33).    RECOMMENDATIONS:    1.    CPAP titration.    2.   Avoid the supine position.    3.   Weight loss.    4.   Avoid alcohol.    5.   Avoid sedating drug.   6.   Patient should not drive if at all sleepy.      Please do not hesitate to contact me if there is any question whatsoever regarding interpretation of this  study.    Dictated By Issa Shi MD  d: 02/07/2024 18:33:36  t: 02/07/2024 18:56:02  [facility] [id number]  PJ/    cc: JOSÉ Ceja

## 2024-02-09 ENCOUNTER — TELEPHONE (OUTPATIENT)
Facility: LOCATION | Age: 24
End: 2024-02-09

## 2024-02-09 ENCOUNTER — TELEPHONE (OUTPATIENT)
Dept: FAMILY MEDICINE CLINIC | Facility: CLINIC | Age: 24
End: 2024-02-09

## 2024-02-09 NOTE — TELEPHONE ENCOUNTER
Patient's mother advised and verbalized understanding.  Mom states she wants to consult with Dr. Call regarding results prior to seeing Dr. Glasgow  Results faxed to Dr. Call's office   Dutasteride Male Counseling: Dustasteride Counseling:  I discussed with the patient the risks of use of dutasteride including but not limited to decreased libido, decreased ejaculate volume, and gynecomastia. Women who can become pregnant should not handle medication.  All of the patient's questions and concerns were addressed. Dutasteride Counseling: Dustasteride Counseling:  I discussed with the patient the risks of use of dutasteride including but not limited to decreased libido, decreased ejaculate volume, and gynecomastia. Women who can become pregnant should not handle medication.  All of the patient's questions and concerns were addressed.

## 2024-02-09 NOTE — TELEPHONE ENCOUNTER
----- Message from JOSÉ Black sent at 2/9/2024  9:29 AM CST -----  Patient does have ELLA  See Dr. Glasgow for evaluation

## 2024-02-09 NOTE — TELEPHONE ENCOUNTER
Pt's mother called. Pt had sleep study done 2//7/24 ordered by you- under procedure tab. Appt made 3/7/24. Pt's mother would like to know if pt needs to be seen sooner? Mp    Please call mother only. Pt will not answer phone, mother has permission to speak with you. Thank you  Mother Carolina [phone]

## 2024-02-10 ENCOUNTER — MOBILE ENCOUNTER (OUTPATIENT)
Facility: LOCATION | Age: 24
End: 2024-02-10

## 2024-02-10 NOTE — PROGRESS NOTES
I spoke with mom. Sleep study does show sleep apnea with an index of approximately 23 on the back and 8 on the side. The patient does have enlarged tonsils and adenoids. We have discussed surgery and weight loss versus other forms of treatment for sleep apnea. They will follow-up with pulmonary medicine and myself we will make a plan.

## 2024-02-12 DIAGNOSIS — F41.1 GAD (GENERALIZED ANXIETY DISORDER): ICD-10-CM

## 2024-02-12 DIAGNOSIS — I10 HYPERTENSION, UNSPECIFIED TYPE: ICD-10-CM

## 2024-02-12 RX ORDER — PROPRANOLOL HYDROCHLORIDE 20 MG/1
20 TABLET ORAL 2 TIMES DAILY
Qty: 180 TABLET | Refills: 0 | Status: SHIPPED | OUTPATIENT
Start: 2024-02-12

## 2024-02-12 NOTE — TELEPHONE ENCOUNTER
Hypertension Medications Protocol Vjvzns7402/12/2024 09:03 AM   Protocol Details CMP or BMP in past 12 months    Last BP reading less than 140/90    In person appointment or virtual visit in the past 12 mos or appointment in next 3 mos    EGFRCR or GFRNAA > 50

## 2024-02-15 ENCOUNTER — OFFICE VISIT (OUTPATIENT)
Dept: RHEUMATOLOGY | Facility: CLINIC | Age: 24
End: 2024-02-15
Payer: COMMERCIAL

## 2024-02-15 ENCOUNTER — TELEPHONE (OUTPATIENT)
Dept: FAMILY MEDICINE CLINIC | Facility: CLINIC | Age: 24
End: 2024-02-15

## 2024-02-15 VITALS
HEART RATE: 70 BPM | OXYGEN SATURATION: 99 % | RESPIRATION RATE: 18 BRPM | TEMPERATURE: 98 F | BODY MASS INDEX: 50.02 KG/M2 | HEIGHT: 64 IN | DIASTOLIC BLOOD PRESSURE: 80 MMHG | SYSTOLIC BLOOD PRESSURE: 118 MMHG | WEIGHT: 293 LBS

## 2024-02-15 DIAGNOSIS — M25.561 CHRONIC PAIN OF BOTH KNEES: ICD-10-CM

## 2024-02-15 DIAGNOSIS — M25.562 CHRONIC PAIN OF BOTH KNEES: ICD-10-CM

## 2024-02-15 DIAGNOSIS — M76.71 PERONEAL TENDINITIS OF RIGHT LOWER EXTREMITY: ICD-10-CM

## 2024-02-15 DIAGNOSIS — M76.72 PERONEAL TENDINITIS OF LEFT LOWER EXTREMITY: ICD-10-CM

## 2024-02-15 DIAGNOSIS — M25.572 CHRONIC PAIN OF BOTH ANKLES: ICD-10-CM

## 2024-02-15 DIAGNOSIS — M25.571 CHRONIC PAIN OF BOTH ANKLES: ICD-10-CM

## 2024-02-15 DIAGNOSIS — R29.6 FREQUENT FALLS: ICD-10-CM

## 2024-02-15 DIAGNOSIS — Z87.310 H/O HEALED FRAGILITY FRACTURE: ICD-10-CM

## 2024-02-15 DIAGNOSIS — E83.50 CALCIUM METABOLISM DISORDER: ICD-10-CM

## 2024-02-15 DIAGNOSIS — G89.29 CHRONIC PAIN OF BOTH ANKLES: ICD-10-CM

## 2024-02-15 DIAGNOSIS — M25.50 HYPERMOBILITY ARTHRALGIA: ICD-10-CM

## 2024-02-15 DIAGNOSIS — G89.29 CHRONIC PAIN OF BOTH KNEES: ICD-10-CM

## 2024-02-15 DIAGNOSIS — M85.9 LOW BONE DENSITY FOR AGE: Primary | ICD-10-CM

## 2024-02-15 RX ORDER — SODIUM FLUORIDE 6 MG/ML
PASTE, DENTIFRICE DENTAL
COMMUNITY
Start: 2024-02-13

## 2024-02-15 RX ORDER — MELOXICAM 15 MG/1
15 TABLET ORAL
Qty: 90 TABLET | Refills: 1 | Status: SHIPPED | OUTPATIENT
Start: 2024-02-15 | End: 2024-05-15

## 2024-02-15 NOTE — PATIENT INSTRUCTIONS
[employer] Osteogenesis Imperfecta and Bone Fragility Panel  Test catalog  [employer]     Take meloxicam 15 mg daily with food, don't take ibuprofen, naproxen, voltaren the same day, they are in the same family.    More reading about hypermobility  Books - Dr. Colton Martinez M.D., Ph.D. ([email or web address])     “Joint Hypermobility Handbook 10th Anniversary Edition: A Guide for the Issues & Management of Stephon-Danlos Syndrome Hypermobility Type and the Hypermobility Syndrome” Juan RAMIREZ  https://www.C[employer]/books/      Here is a local physical therapist that has expertise in hypermobility. She is very good.   Thom Wade, PT, MPT    [email or web address]  Ph [phone]    Fax [phone] [address] [place] [address]  [email or web address]

## 2024-02-15 NOTE — PROGRESS NOTES
Rheumatology New Patient Note  =====================================================================================================    Date of visit: 2/15/2024  ?  Chief complaint: Polyarthralgia  Chief Complaint   Patient presents with    [facility]     New pt. Dr. Aguilar referral. Had physical in October and had some ongoing chronic pain. Lab work done and inflammatory levels elevated. Knee and ankle pain. No rashes. No swelling. Converted rapid score of 2.7     Referring (will send letter)  PCP  Kiah Aguilar MD  Fax: [phone]  Phone: [phone]  =====================================================================================================  [facility]    Roxane Tyler is a 23 year old adult     Patient here for further evaluation of chronic lower extremity polyarthralgia.  Here with mother  Broken multiple bones > 10: elbow, bilateral wrists, ankles/foot in the past several years.  Has fractured both ankles in the past.  These were all ground level falls.  Patient with frequent falls.  -has completed physical therapy.  Partial relief.  -Knees do hyperextend.     Pain is chronic for many years. Takes 600 mg twice daily of ibuprofen with partial relief.  -gabapentin 400 mg at bedtime; for mood disorder, does not improve pain.  -Denies any back pain, personal history of psoriasis    Intermittent abdominal issues.     Fhx:  Maternal grandmother with psoriatic arthritis, psoriasis, ulcerative colitis    14 point ROS negative except noted above    Medications:  Current Outpatient Medications on File Prior to Visit   Medication Sig Dispense Refill    SODIUM FLUORIDE 5000 PPM 1.1 % Dental Paste       CLOMIPRAMINE 50 MG Oral Cap TAKE 2 CAPSULES (100 MG TOTAL) BY MOUTH AT BEDTIME. 60 capsule 0    TRIHEXYPHENIDYL 2 MG Oral Tab TAKE 2 TABLETS (4 MG TOTAL) BY MOUTH 3 (THREE) TIMES DAILY WITH MEALS. 180 tablet 1    propranolol 20 MG Oral Tab TAKE 1 TABLET BY MOUTH TWICE A  tablet 0    liothyronine  5 MCG Oral Tab Take 2 tablets (10 mcg total) by mouth daily. 180 tablet 0    Multiple Vitamins-Minerals (MULTIVITAMIN GUMMIES WOMENS OR) Take 1 tablet by mouth daily.      Melatonin 5 MG Oral Tab Take 1 tablet (5 mg total) by mouth at bedtime.      gabapentin 400 MG Oral Cap Take 1 capsule (400 mg total) by mouth nightly. 30 capsule 1    hydrOXYzine 50 MG Oral Tab Take 1 tablet (50 mg total) by mouth 3 (three) times daily as needed for Anxiety. 90 tablet 0    lurasidone 60 MG Oral Tab Take 1 tablet (60 mg total) by mouth at bedtime. 30 tablet 1    naltrexone 50 MG Oral Tab Take 2 tablets (100 mg total) by mouth at bedtime. 60 tablet 1    OXcarbazepine 600 MG Oral Tab Take 1 tablet (600 mg total) by mouth 2 (two) times daily. 60 tablet 1    metFORMIN  MG Oral Tablet 24 Hr Take 1 tablet (500 mg total) by mouth 2 (two) times daily with meals. 180 tablet 1    pravastatin 20 MG Oral Tab Take 1 tablet (20 mg total) by mouth nightly. 90 tablet 0    Probiotic Product (DIGESTIVE ADVANTAGE GUMMIES) Oral Chew Tab Chew 2 tablets by mouth every evening.      Cholecalciferol 75 MCG (3000 UT) Oral Tab Take 6,000 Units by mouth daily. 30 tablet 0    Ferrous Sulfate (IRON) 325 (65 Fe) MG Oral Tab Take 1 tablet by mouth 2 (two) times daily.       No current facility-administered medications on file prior to visit.       Past Medical History:  Past Medical History:   Diagnosis Date    ADHD (attention deficit hyperactivity disorder)     Anemia     Anxiety     Arthritis     Autism 2020    Borderline personality disorder (HCC)     Depression     Essential hypertension     Hyperlipidemia     Obesity     Pre-diabetes     PTSD (post-traumatic stress disorder)     Suicidal behavior with attempted self-injury (HCC) 2021    Tachycardia      Past Surgical History:  History reviewed. No pertinent surgical history.  Family History:  Family History   Problem Relation Age of Onset    Heart Disorder Father         CARDIOMYOPATHY    Heart  Disease Father         CARDIOMYOPATHY    Depression Father     ADHD Father     Diabetes Mother         type 2    Other (Other) Maternal Grandmother         psoriatic arthritis    Bipolar Disorder Sister     Cancer Neg     Stroke Neg      Social History:  Social History     Socioeconomic History    Marital status: Single   Tobacco Use    Smoking status: Never     Passive exposure: Never    Smokeless tobacco: Never    Tobacco comments:     non-smoker   Vaping Use    Vaping Use: Never used   Substance and Sexual Activity    Alcohol use: No    Drug use: No     Social Determinants of Health     Food Insecurity: No Food Insecurity (12/12/2023)    Food Insecurity     Food Insecurity: Never true   Transportation Needs: No Transportation Needs (12/12/2023)    Transportation Needs     Lack of Transportation: No   Housing Stability: Low Risk  (12/12/2023)    Housing Stability     Housing Instability: No     ?  Allergies:  Allergies   Allergen Reactions    Adhesive Tape HIVES     \"break out\"    Seasonal OTHER (SEE COMMENTS)     Congestion, runny nose, nose bleeds         Objective    Vitals:    02/15/24 1557   BP: 118/80   Pulse: 70   Resp: 18   Temp: 97.9 °F (36.6 °C)   SpO2: 99%   Weight: (!) 360 lb (163.3 kg)   Height: 5' 4\" (1.626 m)       GEN: NAD, well-nourished.   HEENT: Head: NCAT. Face: No lesions. Eyes: Conjunctiva clear. Sclera are anicteric. PERRLA. EOMs are full. Ears: The right and left ear canals are clear.  Nose: No external or internal nasal deformities. Nasal septum is midline. Mouth: The lips are within normal limits.  No oral ulcers Tongue is midline with no lesions. The oral cavity is clear.   Neck: Supple. No neck masses. No thyromegaly. No LAD, parotid or submandicular gland palpated.   CV: RRR, no mrg, S1/S2  PULM: CTAB, no wrr, easy effort  Extremities: No cyanosis, edema or deformities.   Neurologic: Strength, CN2-12 grossly intact   Psych: normal affect.   Skin: No lesions or rashes.  MSK: 28 joint  count performed. No evidence of synovitis in mcp, pip, dip, wrist, elbows, shoulders, hips, knees, ankles, mtp unless otherwise noted. Full ROM of elbows, wrists, knees.    Hands- No ulceration/lesions noted. No swelling in IPJs, no TTP or synovitis noted in joints of hand   Wrists- No pain with wrist flexion and extension. No swelling, erythema, or increased warmth.   Elbows- No swelling, erythema, or increased warmth.   Shoulders- FROM, abduction ~180 degrees bilaterally.    Knees- No swelling, erythema, or increased warmth. AROM flexion/extension ~0-180 degrees.    No valgus/varus laxities appreciated.   Ankles/Feet- No swelling, erythema, or increased warmth.    Beighton  ?Passive dorsiflexion of the fifth finger >90 degrees with forearm flat: no    ?Passive apposition of the thumb to the flexor aspect of the forearm: yes bilateral    ?Hyperextension of elbow >10 degrees: yes on the right     ?Hyperextensibility of the knee >10 degrees: yes bilateral    ?Flexion of waist with palms on the floor (and with the knees fully extended): no    Positive gorlin's  test  -Inversion and eversion of the ankles within normal range of motion    ?  Labs:      1/2024  TSH WNL  Urine pregnancy test is negative  CBC W differential WNL  Creatinine 0.67, rest of CMP WNL    10/2023  CRP 1.88 MGs/DL  Sed rate 47  RF, SHARRI negative    Lab Results   Component Value Date    WBC 8.2 01/21/2024    RBC 4.55 01/21/2024    HGB 12.9 01/21/2024    HCT 38.8 01/21/2024    .0 01/21/2024    MCV 85.3 01/21/2024    MCH 28.4 01/21/2024    MCHC 33.2 01/21/2024    RDW 12.6 01/21/2024    NEPRELIM 4.49 01/21/2024    NEPERCENT 54.6 01/21/2024    LYPERCENT 32.8 01/21/2024    MOPERCENT 5.3 01/21/2024    EOPERCENT 6.3 01/21/2024    BAPERCENT 0.6 01/21/2024    NE 4.49 01/21/2024    LYMABS 2.70 01/21/2024    MOABSO 0.44 01/21/2024    EOABSO 0.52 01/21/2024    BAABSO 0.05 01/21/2024     Lab Results   Component Value Date    GLU 91 01/21/2024    BUN 12  01/21/2024    BUNCREA 19.3 07/07/2021    CREATSERUM 0.67 01/21/2024    ANIONGAP 8 01/21/2024     04/03/2015    GFRNAA 126 07/18/2022    GFRAA 146 07/18/2022    CA 8.9 01/21/2024    OSMOCALC 289 01/21/2024    ALKPHO 80 01/21/2024    AST 20 01/21/2024    ALT  01/21/2024      Comment:      Due to  backorder we are temporarily unable to offer hospital-based ALT testing at [facility].   If urgently needed, please order ALT test code [id number].   The new order will need a new venipuncture and will be sent to [facility] for testing.   The expected turnaround time will be within 24 hours.     BILT 0.2 01/21/2024    TP 7.2 01/21/2024    ALB 3.5 01/21/2024    GLOBULIN 3.7 01/21/2024     01/21/2024    K 3.8 01/21/2024     01/21/2024    CO2 29.0 01/21/2024         No results found for: \"ANATI\", \"SHARRI\", \"ANAS\", \"ANASCRN\", \"ANASCRNRFLX\", \"YUKI\"  No results found for: \"SSA\", \"SSAUR\", \"ANTISSA\", \"SSA52\", \"SSA60\", \"SSADD\", \"SSB\", \"ANTISSB\"  No results found for: \"DSDNA\", \"ANTIDSDNA\", \"SMUD\", \"ANTISM\", \"SM\", \"RNP\", \"ANTIRNP\", \"SMITHRNP\"  No results found for: \"SCL70\", \"SCL\", \"SRZPQHK30\"  No results found for: \"C3\", \"C4\"  No results found for: \"DRVVT\", \"LAINT\", \"PTTLUPUS\", \"LUPUSINTERP\", \"LA\", \"Q2OI7DOETP\", \"W8LR4OGFTS\", \"W4NVVCTJZP\", \"F4SNVHAKLV\"  No results found for: \"CARDIOLIPIGG\", \"CARDIOLIPIGM\", \"CARDIOLIPIGA\", \"CARDIOIGA\", \"CARLIP\"      Additional Labs:    Radiology:    Radiology review:      =====================================================================================================  Assessment and Plan    Assessment:  1. Low bone density for age    2. H/O healed fragility fracture    3. Calcium metabolism disorder    4. Hypermobility arthralgia    5. Chronic pain of both knees    6. Chronic pain of both ankles    7. Peroneal tendinitis of right lower extremity    8. Peroneal tendinitis of left lower extremity    9. Frequent falls      Long history of frequent fragility fractures in  multiple joints including ankles, right elbow, wrists.  Fractures sustained after ground-level falls.  Unclear why the patient has a propensity for fragility fractures.    Chronic arthralgia appears to be related to mechanical issues prior fractures and soft tissue injuries sustained after multiple falls in the past.  Evidence of active peroneal tendinitis of the bilateral ankles noted today.  Requiring chronic ibuprofen 600 mg twice daily.  No clinical features that would be consistent with an inflammatory arthropathy    Elevated sed rate and C-reactive protein, this is nonspecific.  No clinical correlates that I can appreciate today.    Ciara's 5/9 today, however the right elbow which was fractured in the past is hypermobile but the left is not.  So the patient certainly has at least a localized hypermobility arthralgia in the knees and in the thumbs.  Perhaps this is contributing to the chronic pain the patient is having.  ?  Plan:  --If not yet completed. Osteoporosis and/or osteopenia work-up: CMP, CBC, vitamin D, PTH, TSH, mag, Phos, 24 hr calcium/Cr.  Obtain SPEP and celiac disease testing.   -Elevated inflammatory markers: Will repeat sed rate and C-reactive protein, obtain CCP and HLA-B27 to rule out possible systemic inflammatory arthritis  -Stop ibuprofen.  Take meloxicam 15 mg daily with food, don't take ibuprofen, naproxen, voltaren the same day, they are in the same family.  -Consider Cymbalta for chronic MSK pain and overlap mood disorder.  Psychiatry would need to prescribe given the patient's severe mood disorders  -If low bone density testing as noted above is negative consider the following test:  [employer] Osteogenesis Imperfecta and Bone Fragility Panel  Test catalog  [employer]   -Refer to physical therapy for the above mechanical issues and hypermobility    Rtc after the above  Diagnoses and all orders for this visit:    H/O healed fragility fracture  -     PTH, Intact; Future  -      Phosphorus; Future  -     Magnesium; Future  -     Calcium, 24Hr Urine; Future  -     Creatinine, 24-Hour Urine; Future  -     TSH W Reflex To Free T4; Future  -     Monoclonal Protein Study; Future  -     CELIAC DISEASE SCREEN Reflex; Future  -     Iron And Tibc; Future  -     Ferritin; Future  -     B12 AND FOLATE; Future  -     Vitamin D; Future  -     Sed Rate, Westergren (Automated); Future  -     C-Reactive Protein; Future  -     Cyclic Citrullinate Pep. IGG; Future  -     HLA B 27 Disease Association; Future  -     Meloxicam 15 MG Oral Tab; Take 1 tablet (15 mg total) by mouth daily with breakfast.    Low bone density for age  -     PTH, Intact; Future  -     Phosphorus; Future  -     Magnesium; Future  -     Calcium, 24Hr Urine; Future  -     Creatinine, 24-Hour Urine; Future  -     TSH W Reflex To Free T4; Future  -     Monoclonal Protein Study; Future  -     CELIAC DISEASE SCREEN Reflex; Future  -     Iron And Tibc; Future  -     Ferritin; Future  -     B12 AND FOLATE; Future  -     Vitamin D; Future  -     Sed Rate, Westergren (Automated); Future  -     C-Reactive Protein; Future  -     Cyclic Citrullinate Pep. IGG; Future  -     HLA B 27 Disease Association; Future  -     Meloxicam 15 MG Oral Tab; Take 1 tablet (15 mg total) by mouth daily with breakfast.    Calcium metabolism disorder  -     PTH, Intact; Future  -     Phosphorus; Future  -     Magnesium; Future  -     Calcium, 24Hr Urine; Future  -     Creatinine, 24-Hour Urine; Future  -     TSH W Reflex To Free T4; Future  -     Monoclonal Protein Study; Future  -     CELIAC DISEASE SCREEN Reflex; Future  -     Iron And Tibc; Future  -     Ferritin; Future  -     B12 AND FOLATE; Future  -     Vitamin D; Future  -     Sed Rate, UXCamergren (Automated); Future  -     C-Reactive Protein; Future  -     Cyclic Citrullinate Pep. IGG; Future  -     HLA B 27 Disease Association; Future  -     Meloxicam 15 MG Oral Tab; Take 1 tablet (15 mg total) by mouth daily  with breakfast.    Hypermobility arthralgia  -     Physical Therapy Referral - [facility]    Chronic pain of both knees  -     Physical Therapy Referral - [facility]    Chronic pain of both ankles  -     Physical Therapy Referral - [facility]    Peroneal tendinitis of right lower extremity  -     Physical Therapy Referral - [facility]    Peroneal tendinitis of left lower extremity  -     Physical Therapy Referral - [facility]    Frequent falls  -     Physical Therapy Referral - [facility]        No follow-ups on file.      The above plan of care, diagnosis, orders, and follow-up were discussed with the patient. Questions related to this recommended plan of care were answered.    Thank you for referring this delightful patient to me. Please feel free to contact me with any questions.     This report was performed utilizing speech recognition software technology. Despite proofreading, speech recognition errors could escape detection. If a word or phrase is confusing or out of context, please do not hesitate to call for   clarification.       Kind regards      Goldy Guerrero MD  [facility]

## 2024-02-16 ENCOUNTER — LAB ENCOUNTER (OUTPATIENT)
Dept: LAB | Age: 24
End: 2024-02-16
Attending: INTERNAL MEDICINE
Payer: COMMERCIAL

## 2024-02-16 DIAGNOSIS — M85.9 LOW BONE DENSITY FOR AGE: ICD-10-CM

## 2024-02-16 DIAGNOSIS — Z87.310 H/O HEALED FRAGILITY FRACTURE: ICD-10-CM

## 2024-02-16 DIAGNOSIS — E83.50 CALCIUM METABOLISM DISORDER: ICD-10-CM

## 2024-02-16 DIAGNOSIS — E78.5 HYPERLIPIDEMIA, UNSPECIFIED HYPERLIPIDEMIA TYPE: ICD-10-CM

## 2024-02-16 LAB
CHOLEST SERPL-MCNC: 231 MG/DL (ref ?–200)
CRP SERPL-MCNC: 1.97 MG/DL (ref ?–0.3)
DEPRECATED HBV CORE AB SER IA-ACNC: 44.7 NG/ML
ERYTHROCYTE [SEDIMENTATION RATE] IN BLOOD: 32 MM/HR
FASTING PATIENT LIPID ANSWER: NO
FOLATE SERPL-MCNC: 62.5 NG/ML (ref 8.7–?)
HDLC SERPL-MCNC: 46 MG/DL (ref 40–59)
IGA SERPL-MCNC: 75.4 MG/DL (ref 70–312)
IRON SATN MFR SERPL: 22 %
IRON SERPL-MCNC: 69 UG/DL
LDLC SERPL CALC-MCNC: 155 MG/DL (ref ?–100)
MAGNESIUM SERPL-MCNC: 1.9 MG/DL (ref 1.6–2.6)
NONHDLC SERPL-MCNC: 185 MG/DL (ref ?–130)
PHOSPHATE SERPL-MCNC: 4.6 MG/DL (ref 2.5–4.9)
PTH-INTACT SERPL-MCNC: 42.2 PG/ML (ref 18.5–88)
TIBC SERPL-MCNC: 314 UG/DL (ref 240–450)
TRANSFERRIN SERPL-MCNC: 211 MG/DL (ref 200–360)
TRIGL SERPL-MCNC: 163 MG/DL (ref 30–149)
TSI SER-ACNC: 1.69 MIU/ML (ref 0.36–3.74)
VIT B12 SERPL-MCNC: 472 PG/ML (ref 193–986)
VIT D+METAB SERPL-MCNC: 30.1 NG/ML (ref 30–100)
VLDLC SERPL CALC-MCNC: 31 MG/DL (ref 0–30)

## 2024-02-16 PROCEDURE — 36415 COLL VENOUS BLD VENIPUNCTURE: CPT

## 2024-02-16 PROCEDURE — 83550 IRON BINDING TEST: CPT

## 2024-02-16 PROCEDURE — 83540 ASSAY OF IRON: CPT

## 2024-02-16 PROCEDURE — 86364 TISS TRNSGLTMNASE EA IG CLAS: CPT

## 2024-02-16 PROCEDURE — 86200 CCP ANTIBODY: CPT

## 2024-02-16 PROCEDURE — 82728 ASSAY OF FERRITIN: CPT

## 2024-02-16 PROCEDURE — 83970 ASSAY OF PARATHORMONE: CPT

## 2024-02-16 PROCEDURE — 86334 IMMUNOFIX E-PHORESIS SERUM: CPT

## 2024-02-16 PROCEDURE — 80061 LIPID PANEL: CPT

## 2024-02-16 PROCEDURE — 82784 ASSAY IGA/IGD/IGG/IGM EACH: CPT

## 2024-02-16 PROCEDURE — 83521 IG LIGHT CHAINS FREE EACH: CPT

## 2024-02-16 PROCEDURE — 82746 ASSAY OF FOLIC ACID SERUM: CPT

## 2024-02-16 PROCEDURE — 84165 PROTEIN E-PHORESIS SERUM: CPT

## 2024-02-16 PROCEDURE — 85652 RBC SED RATE AUTOMATED: CPT

## 2024-02-16 PROCEDURE — 86140 C-REACTIVE PROTEIN: CPT

## 2024-02-16 PROCEDURE — 84100 ASSAY OF PHOSPHORUS: CPT

## 2024-02-16 PROCEDURE — 83735 ASSAY OF MAGNESIUM: CPT

## 2024-02-16 PROCEDURE — 82607 VITAMIN B-12: CPT

## 2024-02-16 PROCEDURE — 81374 HLA I TYPING 1 ANTIGEN LR: CPT

## 2024-02-16 PROCEDURE — 82306 VITAMIN D 25 HYDROXY: CPT

## 2024-02-16 PROCEDURE — 84443 ASSAY THYROID STIM HORMONE: CPT

## 2024-02-17 ENCOUNTER — TELEPHONE (OUTPATIENT)
Dept: FAMILY MEDICINE CLINIC | Facility: CLINIC | Age: 24
End: 2024-02-17

## 2024-02-17 DIAGNOSIS — E78.00 HYPERCHOLESTEREMIA: ICD-10-CM

## 2024-02-17 RX ORDER — PRAVASTATIN SODIUM 40 MG
40 TABLET ORAL NIGHTLY
COMMUNITY
Start: 2024-02-17

## 2024-02-17 NOTE — TELEPHONE ENCOUNTER
Mom advised and verbalized understanding  Med list updated  Reminder and order placed.    Lolita mancilla APRN  You12 minutes ago (9:54 AM)       Yes, increase to 40 mg nightly. Recheck lipid and lfts in 8 weeks

## 2024-02-17 NOTE — TELEPHONE ENCOUNTER
----- Message from JOSÉ Black sent at 2/17/2024  7:22 AM CST -----  Cholesterol is elevated. Would patient be interested in trying cholesterol lowering medication?

## 2024-02-17 NOTE — TELEPHONE ENCOUNTER
Patient's mother advised and verbalized understanding  Patient already taking pravastatin 20 mg  Would you like to increase dose?

## 2024-02-19 LAB
CCP IGG SERPL-ACNC: 1 U/ML (ref 0–6.9)
TTG IGA SER-ACNC: <0.2 U/ML (ref ?–7)

## 2024-02-22 LAB
ALBUMIN SERPL ELPH-MCNC: 4.07 G/DL (ref 3.75–5.21)
ALBUMIN/GLOB SERPL: 1.44 {RATIO} (ref 1–2)
ALPHA1 GLOB SERPL ELPH-MCNC: 0.3 G/DL (ref 0.19–0.46)
ALPHA2 GLOB SERPL ELPH-MCNC: 1.06 G/DL (ref 0.48–1.05)
B-GLOBULIN SERPL ELPH-MCNC: 0.69 G/DL (ref 0.68–1.23)
GAMMA GLOB SERPL ELPH-MCNC: 0.79 G/DL (ref 0.62–1.7)
KAPPA LC FREE SER-MCNC: 1.24 MG/DL (ref 0.33–1.94)
KAPPA LC FREE/LAMBDA FREE SER NEPH: 1.71 {RATIO} (ref 0.26–1.65)
LAMBDA LC FREE SERPL-MCNC: 0.72 MG/DL (ref 0.57–2.63)
PROT SERPL-MCNC: 6.9 G/DL (ref 5.7–8.2)

## 2024-02-23 LAB — HLA-B27: NEGATIVE

## 2024-02-27 ENCOUNTER — TELEPHONE (OUTPATIENT)
Dept: FAMILY MEDICINE CLINIC | Facility: CLINIC | Age: 24
End: 2024-02-27

## 2024-02-27 DIAGNOSIS — E78.00 HYPERCHOLESTEREMIA: ICD-10-CM

## 2024-02-27 RX ORDER — PRAVASTATIN SODIUM 40 MG
40 TABLET ORAL NIGHTLY
Qty: 90 TABLET | Refills: 0 | Status: SHIPPED | OUTPATIENT
Start: 2024-02-27

## 2024-02-27 NOTE — TELEPHONE ENCOUNTER
Refill sent  Future Appointments   Date Time Provider Department Center   2/28/2024 10:30 AM Camryn Vickers APRN [facility]   3/4/2024  1:00 PM Valentina Hull, LCSW [facility]   3/7/2024  3:20 PM Nader Call MD [id number]   3/11/2024  1:00 PM Valentina Hull LCSW [facility]   3/18/2024  1:00 PM Valentina Hull LCSW [facility]   3/25/2024  1:00 PM Valentina Hull LCSW [facility]      Lolita Johnson APRN   to Sindy Irwin, RN       2/17/24  9:54 AM   Yes, increase to 40 mg nightly. Recheck lipid and lfts in 8 weeks

## 2024-02-28 PROBLEM — E34.9 ENDOCRINE DISORDER: Status: ACTIVE | Noted: 2023-11-16

## 2024-02-28 PROBLEM — R73.03 PRE-DIABETES: Status: ACTIVE | Noted: 2021-06-15

## 2024-03-01 ENCOUNTER — LAB ENCOUNTER (OUTPATIENT)
Dept: LAB | Age: 24
End: 2024-03-01
Attending: INTERNAL MEDICINE
Payer: COMMERCIAL

## 2024-03-07 ENCOUNTER — OFFICE VISIT (OUTPATIENT)
Facility: LOCATION | Age: 24
End: 2024-03-07
Payer: COMMERCIAL

## 2024-03-07 DIAGNOSIS — J35.3 TONSILLAR AND ADENOID HYPERTROPHY: Primary | ICD-10-CM

## 2024-03-07 DIAGNOSIS — G47.33 OBSTRUCTIVE SLEEP APNEA SYNDROME: ICD-10-CM

## 2024-03-07 PROCEDURE — 99214 OFFICE O/P EST MOD 30 MIN: CPT | Performed by: OTOLARYNGOLOGY

## 2024-03-07 NOTE — PROGRESS NOTES
Roxane Tyler is a 23 year old adult.   Chief Complaint   Patient presents with    Results     HPI:   Patient has sleep apnea.  The patient denies nasal congestion.    REVIEW OF SYSTEMS:   GENERAL HEALTH: feels well otherwise  GENERAL : denies fever, chills, sweats, weight loss, weight gain  SKIN: denies any unusual skin lesions or rashes  RESPIRATORY: denies shortness of breath with exertion  NEURO: denies headaches    EXAM:   LMP 12/01/2023 (Exact Date)     System Findings Details   Constitutional  Overall appearance - Normal.   Psychiatric  Orientation - Oriented to time, place, person & situation. Appropriate mood and affect.   Head/Face  Facial features -- Normal. Skull - Normal.   Eyes  Pupils equal ,round ,react to light and accomidate   Ears, Nose, Throat, Neck  Ears clear nose mild congestion oropharynx +3/4 tonsils neck no masses   Neurological  Memory - Normal. Cranial nerves - Cranial nerves II through XII grossly intact.   Lymph Detail  Submental. Submandibular. Anterior cervical. Posterior cervical. Supraclavicular.       ASSESSMENT AND PLAN:   1. Tonsillar and adenoid hypertrophy  Attributing to obstructive sleep apnea.  Patient may need tonsillectomy and adenoidectomy.The risk benefits and alternatives were explained to the patient and/or parents.  The risks are to include not limited to bleeding infection recurrent bleeding which could be serious velopharyngeal insufficiency and nonresolution of symptoms.      2. Obstructive sleep apnea syndrome  Sleep study reviewed.  The patient will follow-up with pulmonary sleep specialist.  If they deem that tonsillectomy will make a significant impact in the sleep apnea we will then proceed with surgery.      The patient indicates understanding of these issues and agrees to the plan.    No follow-ups on file.    Nader Call MD  3/7/2024  3:35 PM

## 2024-03-29 RX ORDER — LIOTHYRONINE SODIUM 5 UG/1
10 TABLET ORAL DAILY
Qty: 180 TABLET | Refills: 0 | Status: SHIPPED | OUTPATIENT
Start: 2024-03-29

## 2024-03-29 NOTE — TELEPHONE ENCOUNTER
Last OV 10/23/23  Last refilled on 1/4/24 for # 180 with 0 refills  Future Appointments   Date Time Provider Department Center   4/1/2024  1:00 PM Valentina Hull LCSW [facility]   4/15/2024  1:00 PM Valentina Hull LCSW [facility]   5/1/2024  3:15 PM Camryn Vickers APRN [facility]   5/21/2024  1:00 PM Maddy Glasgow DO [facility]          Thyroid Medication Protocol Passed

## 2024-04-19 ENCOUNTER — TELEPHONE (OUTPATIENT)
Dept: FAMILY MEDICINE CLINIC | Facility: CLINIC | Age: 24
End: 2024-04-19

## 2024-04-19 NOTE — TELEPHONE ENCOUNTER
----- Message from Sindy Irwin RN sent at 2/17/2024 10:10 AM CST -----  Repeat lipid and lft in 2 months

## 2024-05-06 ENCOUNTER — OFFICE VISIT (OUTPATIENT)
Dept: FAMILY MEDICINE CLINIC | Facility: CLINIC | Age: 24
End: 2024-05-06
Payer: COMMERCIAL

## 2024-05-06 ENCOUNTER — LAB ENCOUNTER (OUTPATIENT)
Dept: LAB | Age: 24
End: 2024-05-06
Attending: NURSE PRACTITIONER
Payer: COMMERCIAL

## 2024-05-06 VITALS
SYSTOLIC BLOOD PRESSURE: 108 MMHG | HEIGHT: 64 IN | DIASTOLIC BLOOD PRESSURE: 70 MMHG | OXYGEN SATURATION: 100 % | BODY MASS INDEX: 50.02 KG/M2 | RESPIRATION RATE: 16 BRPM | TEMPERATURE: 97 F | WEIGHT: 293 LBS | HEART RATE: 87 BPM

## 2024-05-06 DIAGNOSIS — R45.851 SUICIDAL IDEATION: ICD-10-CM

## 2024-05-06 DIAGNOSIS — R73.03 PRE-DIABETES: ICD-10-CM

## 2024-05-06 DIAGNOSIS — E78.00 HYPERCHOLESTEREMIA: ICD-10-CM

## 2024-05-06 DIAGNOSIS — N30.00 ACUTE CYSTITIS WITHOUT HEMATURIA: ICD-10-CM

## 2024-05-06 DIAGNOSIS — F33.2 SEVERE EPISODE OF RECURRENT MAJOR DEPRESSIVE DISORDER, WITHOUT PSYCHOTIC FEATURES (HCC): ICD-10-CM

## 2024-05-06 DIAGNOSIS — E03.9 ACQUIRED HYPOTHYROIDISM: ICD-10-CM

## 2024-05-06 DIAGNOSIS — Z09 HOSPITAL DISCHARGE FOLLOW-UP: Primary | ICD-10-CM

## 2024-05-06 DIAGNOSIS — F33.2 MDD (MAJOR DEPRESSIVE DISORDER), RECURRENT SEVERE, WITHOUT PSYCHOSIS (HCC): ICD-10-CM

## 2024-05-06 PROBLEM — Z30.41 ORAL CONTRACEPTIVE USE: Status: RESOLVED | Noted: 2021-08-20 | Resolved: 2024-05-06

## 2024-05-06 LAB
ALBUMIN SERPL-MCNC: 3.6 G/DL (ref 3.4–5)
ALBUMIN/GLOB SERPL: 1 {RATIO} (ref 1–2)
ALP LIVER SERPL-CCNC: 65 U/L
ALT SERPL-CCNC: 28 U/L
ANION GAP SERPL CALC-SCNC: 7 MMOL/L (ref 0–18)
AST SERPL-CCNC: 23 U/L (ref 15–37)
BILIRUB SERPL-MCNC: 0.3 MG/DL (ref 0.1–2)
BUN BLD-MCNC: 15 MG/DL (ref 9–23)
CALCIUM BLD-MCNC: 9.1 MG/DL (ref 8.5–10.1)
CHLORIDE SERPL-SCNC: 104 MMOL/L (ref 98–112)
CHOLEST SERPL-MCNC: 215 MG/DL (ref ?–200)
CO2 SERPL-SCNC: 26 MMOL/L (ref 21–32)
CREAT BLD-MCNC: 0.65 MG/DL
EGFRCR SERPLBLD CKD-EPI 2021: 128 ML/MIN/1.73M2 (ref 60–?)
EST. AVERAGE GLUCOSE BLD GHB EST-MCNC: 114 MG/DL (ref 68–126)
FASTING PATIENT LIPID ANSWER: YES
FASTING STATUS PATIENT QL REPORTED: YES
GLOBULIN PLAS-MCNC: 3.6 G/DL (ref 2.8–4.4)
GLUCOSE BLD-MCNC: 83 MG/DL (ref 70–99)
HBA1C MFR BLD: 5.6 % (ref ?–5.7)
HDLC SERPL-MCNC: 52 MG/DL (ref 40–59)
LDLC SERPL CALC-MCNC: 143 MG/DL (ref ?–100)
NONHDLC SERPL-MCNC: 163 MG/DL (ref ?–130)
OSMOLALITY SERPL CALC.SUM OF ELEC: 284 MOSM/KG (ref 275–295)
POTASSIUM SERPL-SCNC: 3.9 MMOL/L (ref 3.5–5.1)
PROT SERPL-MCNC: 7.2 G/DL (ref 6.4–8.2)
SODIUM SERPL-SCNC: 137 MMOL/L (ref 136–145)
T4 FREE SERPL-MCNC: 0.6 NG/DL (ref 0.8–1.7)
TRIGL SERPL-MCNC: 110 MG/DL (ref 30–149)
TSI SER-ACNC: 2.18 MIU/ML (ref 0.36–3.74)
VLDLC SERPL CALC-MCNC: 20 MG/DL (ref 0–30)

## 2024-05-06 PROCEDURE — 99214 OFFICE O/P EST MOD 30 MIN: CPT | Performed by: NURSE PRACTITIONER

## 2024-05-06 PROCEDURE — 84439 ASSAY OF FREE THYROXINE: CPT | Performed by: NURSE PRACTITIONER

## 2024-05-06 PROCEDURE — 80061 LIPID PANEL: CPT | Performed by: NURSE PRACTITIONER

## 2024-05-06 PROCEDURE — 3078F DIAST BP <80 MM HG: CPT | Performed by: NURSE PRACTITIONER

## 2024-05-06 PROCEDURE — 87086 URINE CULTURE/COLONY COUNT: CPT | Performed by: NURSE PRACTITIONER

## 2024-05-06 PROCEDURE — 3008F BODY MASS INDEX DOCD: CPT | Performed by: NURSE PRACTITIONER

## 2024-05-06 PROCEDURE — 83036 HEMOGLOBIN GLYCOSYLATED A1C: CPT | Performed by: NURSE PRACTITIONER

## 2024-05-06 PROCEDURE — 3074F SYST BP LT 130 MM HG: CPT | Performed by: NURSE PRACTITIONER

## 2024-05-06 PROCEDURE — 80053 COMPREHEN METABOLIC PANEL: CPT | Performed by: NURSE PRACTITIONER

## 2024-05-06 PROCEDURE — 84443 ASSAY THYROID STIM HORMONE: CPT | Performed by: NURSE PRACTITIONER

## 2024-05-06 RX ORDER — METFORMIN HYDROCHLORIDE 500 MG/1
500 TABLET, EXTENDED RELEASE ORAL 2 TIMES DAILY WITH MEALS
COMMUNITY
Start: 2024-03-02

## 2024-05-06 NOTE — PROGRESS NOTES
HPI:     Patient here with mom for ER follow up. Went to [facility] ER 4/9/24 for Propranolol overdose. Patient was admitted to [facility] in [place]. While there, they developed elevated HR and terrible headaches. Taken to [facility] ER on 4/14. Was admitted for 3-4 days (records not available). One of the facilities change the thyroid medication and now that they are home, unsure what they should be taking. Also had UTI but no symptoms. Was given IV antibiotics. Patient is following with outpatient psychiatrist. None of the psych meds were adjusted during hospitalization. Denies thoughts of harming self or others at this time.     Current Outpatient Medications   Medication Sig Dispense Refill    metFORMIN  MG Oral Tablet 24 Hr Take 1 tablet (500 mg total) by mouth 2 (two) times daily with meals.      metoprolol tartrate 25 MG Oral Tab Take 1 tablet (25 mg total) by mouth 2 (two) times daily.      hydrOXYzine 50 MG Oral Tab Take 1 tablet (50 mg total) by mouth 3 (three) times daily as needed for Anxiety. 90 tablet 0    liothyronine 5 MCG Oral Tab TAKE 2 TABLETS BY MOUTH DAILY. 180 tablet 0    trihexyphenidyl 2 MG Oral Tab Take 2 tablets (4 mg total) by mouth 3 (three) times daily with meals. 180 tablet 1    OXcarbazepine 600 MG Oral Tab Take 1 tablet (600 mg total) by mouth 2 (two) times daily. 60 tablet 1    naltrexone 50 MG Oral Tab Take 2 tablets (100 mg total) by mouth at bedtime. 60 tablet 1    lurasidone 60 MG Oral Tab Take 1 tablet (60 mg total) by mouth at bedtime. 30 tablet 1    gabapentin 400 MG Oral Cap Take 1 capsule (400 mg total) by mouth nightly. 30 capsule 1    clomiPRAMINE 50 MG Oral Cap Take 2 capsules (100 mg total) by mouth at bedtime. 60 capsule 1    pravastatin 40 MG Oral Tab Take 1 tablet (40 mg total) by mouth nightly. 90 tablet 0    Meloxicam 15 MG Oral Tab Take 1 tablet (15 mg total) by mouth daily with breakfast. 90 tablet 1    Multiple  Vitamins-Minerals (MULTIVITAMIN GUMMIES WOMENS OR) Take 1 tablet by mouth daily.      Melatonin 5 MG Oral Tab Take 1 tablet (5 mg total) by mouth at bedtime.      Probiotic Product (DIGESTIVE ADVANTAGE GUMMIES) Oral Chew Tab Chew 2 tablets by mouth every evening.      Cholecalciferol 75 MCG (3000 UT) Oral Tab Take 6,000 Units by mouth daily. 30 tablet 0    Ferrous Sulfate (IRON) 325 (65 Fe) MG Oral Tab Take 1 tablet by mouth 2 (two) times daily.        Past Medical History:    ADHD (attention deficit hyperactivity disorder)    Anemia    Anxiety    Arthritis    Autism (HCC)    Borderline personality disorder (HCC)    Depression    Distal radius fracture, left    Essential hypertension    Hyperlipidemia    Obesity    Oral contraceptive use    Pre-diabetes    PTSD (post-traumatic stress disorder)    Suicidal behavior with attempted self-injury (HCC)    Tachycardia      History reviewed. No pertinent surgical history.   Family History   Problem Relation Age of Onset    Heart Disorder Father         CARDIOMYOPATHY    Heart Disease Father         CARDIOMYOPATHY    Depression Father     ADHD Father     Diabetes Mother         type 2    Other (Other) Maternal Grandmother         psoriatic arthritis    Bipolar Disorder Sister     Cancer Neg     Stroke Neg       Social History     Socioeconomic History    Marital status: Single   Tobacco Use    Smoking status: Never     Passive exposure: Never    Smokeless tobacco: Never    Tobacco comments:     non-smoker   Vaping Use    Vaping status: Never Used   Substance and Sexual Activity    Alcohol use: No    Drug use: No     Social Determinants of Health     Financial Resource Strain: Not on File (11/10/2023)    Received from [facility]     Financial Resource Strain     Financial Resource Strain: 0   Food Insecurity: No Food Insecurity (12/12/2023)    Food Insecurity     Food Insecurity: Never true   Transportation Needs: No Transportation Needs (12/12/2023)    Transportation  Needs     Lack of Transportation: No   Physical Activity: Not on File (11/10/2023)    Received from [facility] ROSARIO     Physical Activity     Physical Activity: 0   Stress: Not on File (11/10/2023)    Received from [facility]LEN[facility] ROSARIO     Stress     Stress: 0   Social Connections: Not on File (11/10/2023)    Received from [facility]LENE ROSARIO     Social Connections     Social Connections and Isolation: 0   Housing Stability: Low Risk  (12/12/2023)    Housing Stability     Housing Instability: No         REVIEW OF SYSTEMS:   Review of Systems   Constitutional:  Negative for chills, fatigue and fever.   Respiratory:  Negative for cough.    Cardiovascular:  Negative for chest pain and palpitations.   Genitourinary:  Negative for dysuria, frequency and hematuria.   Psychiatric/Behavioral:  Positive for dysphoric mood. Negative for self-injury and suicidal ideas. The patient is nervous/anxious.        EXAM:   /70 (BP Location: Radial)   Pulse 87   Temp 97.4 °F (36.3 °C) (Temporal)   Resp 16   Ht 5' 4\" (1.626 m)   Wt (!) 336 lb (152.4 kg)   LMP 04/10/2024 (Exact Date)   SpO2 100%   BMI 57.67 kg/m²   Physical Exam  Constitutional:       General: He is not in acute distress.     Appearance: Normal appearance. He is obese.   Cardiovascular:      Rate and Rhythm: Normal rate and regular rhythm.      Heart sounds: Normal heart sounds.   Pulmonary:      Effort: Pulmonary effort is normal.      Breath sounds: Normal breath sounds.   Neurological:      Mental Status: He is alert.   Psychiatric:         Attention and Perception: Attention normal.         Mood and Affect: Affect is flat.         Speech: Speech normal.         Behavior: Behavior is withdrawn (mildly). Behavior is cooperative.         Thought Content: Thought content normal.         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

## 2024-05-07 ENCOUNTER — TELEPHONE (OUTPATIENT)
Dept: FAMILY MEDICINE CLINIC | Facility: CLINIC | Age: 24
End: 2024-05-07

## 2024-05-07 DIAGNOSIS — E03.9 ACQUIRED HYPOTHYROIDISM: Primary | ICD-10-CM

## 2024-05-07 DIAGNOSIS — N30.00 ACUTE CYSTITIS WITHOUT HEMATURIA: ICD-10-CM

## 2024-05-07 DIAGNOSIS — E78.00 HYPERCHOLESTEREMIA: ICD-10-CM

## 2024-05-07 NOTE — TELEPHONE ENCOUNTER
----- Message from JOSÉ Chew sent at 5/7/2024  3:06 PM CDT -----  Contaminated urine culture.   Would recommend attempting clean catch again give recent UTI without symptoms

## 2024-05-07 NOTE — TELEPHONE ENCOUNTER
----- Message from JOSÉ Chew sent at 5/7/2024  9:18 AM CDT -----  TSH stable, free T4 mildly low. Continue current dose of Liothyronine and recheck TSH in 6 weeks  Cholesterol mildly elevated but may have been off statin while hospitalized. Recheck lipid in 6 weeks  Chemistries normal  No diabetes or prediabetes currently, continue current dose of Metformin

## 2024-05-20 NOTE — TELEPHONE ENCOUNTER
Hypertension Medications Protocol Passed      Last refill external  Last OV 5/6/24  Future Appointments   Date Time Provider Department Center   5/20/2024  2:00 PM Valentina Hull LCSW [facility]   5/21/2024  1:00 PM Maddy Glasgow DO [facility]   6/24/2024  4:00 PM Camryn Vickers APRN [facility]

## 2024-05-21 ENCOUNTER — OFFICE VISIT (OUTPATIENT)
Facility: CLINIC | Age: 24
End: 2024-05-21

## 2024-05-21 VITALS
SYSTOLIC BLOOD PRESSURE: 122 MMHG | WEIGHT: 293 LBS | RESPIRATION RATE: 16 BRPM | HEIGHT: 64 IN | OXYGEN SATURATION: 98 % | BODY MASS INDEX: 50.02 KG/M2 | DIASTOLIC BLOOD PRESSURE: 70 MMHG | HEART RATE: 102 BPM

## 2024-05-21 DIAGNOSIS — G47.33 OBSTRUCTIVE SLEEP APNEA: Primary | ICD-10-CM

## 2024-05-21 DIAGNOSIS — G47.10 HYPERSOMNIA: ICD-10-CM

## 2024-05-21 PROCEDURE — 3078F DIAST BP <80 MM HG: CPT | Performed by: OTHER

## 2024-05-21 PROCEDURE — 3008F BODY MASS INDEX DOCD: CPT | Performed by: OTHER

## 2024-05-21 PROCEDURE — 3074F SYST BP LT 130 MM HG: CPT | Performed by: OTHER

## 2024-05-21 PROCEDURE — 99204 OFFICE O/P NEW MOD 45 MIN: CPT | Performed by: OTHER
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
## 2024-06-06 ENCOUNTER — TELEPHONE (OUTPATIENT)
Dept: FAMILY MEDICINE CLINIC | Facility: CLINIC | Age: 24
End: 2024-06-06

## 2024-06-06 NOTE — TELEPHONE ENCOUNTER
Repeat urine culture due  MCM sent  Future Appointments   Date Time Provider Department Center   6/10/2024  2:00 PM Valentina Hull LCSW [facility]   6/17/2024  2:00 PM Valentina Hull LCSW [facility]   6/24/2024  2:00 PM Valentina Hull LCSW [facility]   6/24/2024  4:00 PM Camryn Vickers APRN [facility]   7/1/2024  2:00 PM Valentina Hull LCSW [facility]

## 2024-06-16 DIAGNOSIS — E78.00 HYPERCHOLESTEREMIA: ICD-10-CM

## 2024-06-17 RX ORDER — METOPROLOL TARTRATE 25 MG/1
25 TABLET, FILM COATED ORAL 2 TIMES DAILY
Qty: 180 TABLET | Refills: 0 | Status: ON HOLD | OUTPATIENT
Start: 2024-06-17 | End: 2024-07-25

## 2024-06-17 RX ORDER — PRAVASTATIN SODIUM 40 MG
40 TABLET ORAL NIGHTLY
Qty: 90 TABLET | Refills: 0 | Status: SHIPPED | OUTPATIENT
Start: 2024-06-17 | End: 2024-07-08 | Stop reason: DRUGHIGH

## 2024-06-17 NOTE — TELEPHONE ENCOUNTER
Hypertension Medications Protocol Passed          Cholesterol Medication Protocol Passed        Metoprolol Last refill 5/20/24 #30 0 refill    Pravastatin 2/27/24 90 0 refill

## 2024-06-19 ENCOUNTER — TELEPHONE (OUTPATIENT)
Dept: FAMILY MEDICINE CLINIC | Facility: CLINIC | Age: 24
End: 2024-06-19

## 2024-06-29 ENCOUNTER — LAB ENCOUNTER (OUTPATIENT)
Dept: LAB | Age: 24
End: 2024-06-29
Attending: NURSE PRACTITIONER
Payer: COMMERCIAL

## 2024-06-29 DIAGNOSIS — E78.00 HYPERCHOLESTEREMIA: ICD-10-CM

## 2024-06-29 DIAGNOSIS — E03.9 ACQUIRED HYPOTHYROIDISM: ICD-10-CM

## 2024-06-29 DIAGNOSIS — N30.00 ACUTE CYSTITIS WITHOUT HEMATURIA: ICD-10-CM

## 2024-06-29 LAB
ALBUMIN SERPL-MCNC: 3.9 G/DL (ref 3.4–5)
ALP LIVER SERPL-CCNC: 87 U/L
ALT SERPL-CCNC: 31 U/L
AST SERPL-CCNC: 17 U/L (ref 15–37)
BILIRUB DIRECT SERPL-MCNC: <0.1 MG/DL (ref 0–0.2)
BILIRUB SERPL-MCNC: 0.4 MG/DL (ref 0.1–2)
CHOLEST SERPL-MCNC: 225 MG/DL (ref ?–200)
FASTING PATIENT LIPID ANSWER: YES
HDLC SERPL-MCNC: 44 MG/DL (ref 40–59)
LDLC SERPL CALC-MCNC: 151 MG/DL (ref ?–100)
NONHDLC SERPL-MCNC: 181 MG/DL (ref ?–130)
PROT SERPL-MCNC: 7.6 G/DL (ref 6.4–8.2)
TRIGL SERPL-MCNC: 165 MG/DL (ref 30–149)
TSI SER-ACNC: 1.89 MIU/ML (ref 0.36–3.74)
VLDLC SERPL CALC-MCNC: 32 MG/DL (ref 0–30)

## 2024-06-29 PROCEDURE — 80076 HEPATIC FUNCTION PANEL: CPT

## 2024-06-29 PROCEDURE — 87086 URINE CULTURE/COLONY COUNT: CPT

## 2024-06-29 PROCEDURE — 84443 ASSAY THYROID STIM HORMONE: CPT

## 2024-06-29 PROCEDURE — 80061 LIPID PANEL: CPT

## 2024-06-29 PROCEDURE — 36415 COLL VENOUS BLD VENIPUNCTURE: CPT

## 2024-07-08 ENCOUNTER — TELEPHONE (OUTPATIENT)
Dept: FAMILY MEDICINE CLINIC | Facility: CLINIC | Age: 24
End: 2024-07-08

## 2024-07-08 DIAGNOSIS — E78.00 HYPERCHOLESTEREMIA: Primary | ICD-10-CM

## 2024-07-08 RX ORDER — PRAVASTATIN SODIUM 80 MG/1
80 TABLET ORAL NIGHTLY
Qty: 90 TABLET | Refills: 0 | Status: SHIPPED | OUTPATIENT
Start: 2024-07-08

## 2024-07-08 NOTE — TELEPHONE ENCOUNTER
Patient mom called requested refill said patient was told to up dose to 80 mg.  Patient is almost out of meds. Can the new dose be sent        PRAVASTATIN 40 MG Oral Tab     [id number] [place] [facility] [place] [place] - [address] [place] [address] 34 [phone], [phone] [[id number]]

## 2024-07-17 ENCOUNTER — APPOINTMENT (OUTPATIENT)
Dept: GENERAL RADIOLOGY | Facility: HOSPITAL | Age: 24
End: 2024-07-17
Attending: NURSE PRACTITIONER
Payer: COMMERCIAL

## 2024-07-17 ENCOUNTER — HOSPITAL ENCOUNTER (OUTPATIENT)
Dept: GENERAL RADIOLOGY | Facility: HOSPITAL | Age: 24
Discharge: HOME | End: 2024-07-17
Attending: NURSE PRACTITIONER
Payer: COMMERCIAL

## 2024-07-17 PROCEDURE — 71045 X-RAY EXAM CHEST 1 VIEW: CPT | Performed by: NURSE PRACTITIONER

## 2024-08-07 ENCOUNTER — TELEPHONE (OUTPATIENT)
Dept: FAMILY MEDICINE CLINIC | Facility: CLINIC | Age: 24
End: 2024-08-07

## 2024-08-07 NOTE — TELEPHONE ENCOUNTER
Received a call from Maddy cabrales  @ [facility] in [place], [place].   They are reaching out as a courtesy to inform Lolitakate Johnson that patient was admitted into the In-Patient Eating Disorder unit on 7/30/24.     Ph. # [phone] x [phone]  Unit # for patient. [phone]

## 2024-08-19 ENCOUNTER — PATIENT MESSAGE (OUTPATIENT)
Dept: FAMILY MEDICINE CLINIC | Facility: CLINIC | Age: 24
End: 2024-08-19

## 2024-08-19 ENCOUNTER — TELEPHONE (OUTPATIENT)
Dept: FAMILY MEDICINE CLINIC | Facility: CLINIC | Age: 24
End: 2024-08-19

## 2024-08-19 NOTE — ED NOTES
All of the patients belongings were removed per another PCT  Bag #[id number]  This bag includes    #shirt   #pants   #bra   #shoes   #socks   #underwear   #phone   #fitbit watch   #headband  Patient has a stress ball in her possession  The bag is labeled and electronic

## 2024-08-19 NOTE — TELEPHONE ENCOUNTER
From: Roxane Tyler  To: Lolita Johnson  Sent: 8/19/2024 10:42 AM CDT  Subject: Lab Work Needed    Rupert has been inpatient since July 9th. They were moved to [facility] in [place], [place] on July 26th. Whenever they get home I will make sure this gets completed.

## 2024-08-27 ENCOUNTER — TELEPHONE (OUTPATIENT)
Dept: FAMILY MEDICINE CLINIC | Facility: CLINIC | Age: 24
End: 2024-08-27

## 2024-08-27 NOTE — TELEPHONE ENCOUNTER
Adriane with [facility] called said that patient was at  admitted for an eating disorder on 08/22/24. Just and FYI

## 2024-09-30 ENCOUNTER — TELEPHONE (OUTPATIENT)
Dept: FAMILY MEDICINE CLINIC | Facility: CLINIC | Age: 24
End: 2024-09-30

## 2024-09-30 NOTE — TELEPHONE ENCOUNTER
Ginny MERA Therapist @ [facility] called, states Patient was discharged from the Eating Disorder Program on Friday, if Ann would like a copy of discharge will forward over,    Patient will be continuing out patient treatment in [place],  patient had an appointment today with Psychiatric NURSE PRACTITIONER    Ph. [phone]

## 2024-10-04 RX ORDER — LIOTHYRONINE SODIUM 5 UG/1
10 TABLET ORAL DAILY
Qty: 180 TABLET | Refills: 1 | Status: SHIPPED | OUTPATIENT
Start: 2024-10-04 | End: 2025-01-02

## 2024-10-04 NOTE — TELEPHONE ENCOUNTER
LOV: 5/6/24 for hospital follow up    liothyronine 5 MCG Oral Tab  Take 2 tablets (10 mcg total) by mouth daily. Dispense: 60 tablet, Refills: 0 ordered        07/26/2024       Thyroid Medication Protocol [facility]80/03/2024 07:44 PM   Protocol Details TSH in past 12 months    Last TSH value is normal    In person appointment or virtual visit in the past 12 mos or appointment in next 3 mos     Future Appointments   Date Time Provider Department Center   10/9/2024 11:45 AM Camryn Vickers APRN [facility] [place]

## 2024-10-10 NOTE — ED PROVIDER NOTES
Patient Seen in: [facility] Emergency Department      History   Patient presents with:  Poisoning/Overdose  Eval-P    Stated Complaint: intentional overdose 4 tabs, 60mg latuda    Subjective:   HPI    This is a pleasant 21-year-old who presents to t soft nontender with no rebound tenderness noted  Extremity exam shows no clubbing cyanosis or edema  Skin exam shows no rashes or lacerations  Neuro exam shows no focal deficits  Back exam shows no tenderness       ED Course     Labs Reviewed   COMP METABO Evaluation by crisis pending and disposition pending per crisis recommendation                             Disposition and Plan     Clinical Impression:  Suicidal ideation  (primary encounter diagnosis)     Disposition:  There is no disposition on file for Dialysis

## 2024-10-17 ENCOUNTER — TELEPHONE (OUTPATIENT)
Dept: FAMILY MEDICINE CLINIC | Facility: CLINIC | Age: 24
End: 2024-10-17

## 2024-10-17 NOTE — TELEPHONE ENCOUNTER
RONI    Meloxicam 15mg - Dr Guerrero filled in 2/15/24    Patient will be going to a hospital in [place] this coming Saturday.  Patient needs to bring 30 days worth of medication.    Mom is trying to refill this Rx, and wanted to know if Ann would refill this Rx.     This medication is discontinued and reason for Discontinue states \"Stop Taking @ Discharge\"  Informed mom to call Dr Bolanos office and ask if  wants patient to continue with this Medication.      Mom said she will call Dr Guerrero if he wont refill, she will call back and ask Ann.

## 2024-11-10 ENCOUNTER — HOSPITAL ENCOUNTER (INPATIENT)
Age: 24
LOS: 5 days | Discharge: HOME | DRG: 423 | End: 2024-11-15
Attending: STUDENT IN AN ORGANIZED HEALTH CARE EDUCATION/TRAINING PROGRAM | Admitting: INTERNAL MEDICINE

## 2024-11-10 DIAGNOSIS — E88.89 KETOSIS  (CMD): ICD-10-CM

## 2024-11-10 DIAGNOSIS — E87.20 ACIDOSIS: Primary | ICD-10-CM

## 2024-11-10 DIAGNOSIS — F50.9 EATING DISORDER, UNSPECIFIED TYPE: ICD-10-CM

## 2024-11-10 DIAGNOSIS — F41.1 GAD (GENERALIZED ANXIETY DISORDER): ICD-10-CM

## 2024-11-10 DIAGNOSIS — E87.29 HIGH ANION GAP METABOLIC ACIDOSIS: ICD-10-CM

## 2024-11-10 DIAGNOSIS — F60.3 BORDERLINE PERSONALITY DISORDER  (CMD): ICD-10-CM

## 2024-11-10 LAB
ALBUMIN SERPL-MCNC: 4.6 G/DL (ref 3.4–5)
ALBUMIN/GLOB SERPL: 1.2 {RATIO} (ref 1–2.4)
ALP SERPL-CCNC: 80 UNITS/L (ref 45–117)
ALT SERPL-CCNC: 17 UNITS/L
ANION GAP SERPL CALC-SCNC: 15 MMOL/L (ref 7–19)
ANION GAP SERPL CALC-SCNC: 16 MMOL/L (ref 7–19)
ANION GAP SERPL CALC-SCNC: 24 MMOL/L (ref 7–19)
ANION GAP SERPL CALC-SCNC: ABNORMAL MMOL/L
APPEARANCE UR: ABNORMAL
AST SERPL-CCNC: 35 UNITS/L
ATRIAL RATE (BPM): 114
B-OH-BUTYR SERPL-SCNC: 5.2 MMOL/L (ref 0–0.3)
BACTERIA #/AREA URNS HPF: ABNORMAL /HPF
BASE DEFICIT BLDA-SCNC: -12 MMOL/L (ref -2–3)
BASE DEFICIT BLDV-SCNC: -14 MMOL/L (ref -2–2)
BASOPHILS # BLD: 0 K/MCL (ref 0–0.3)
BASOPHILS NFR BLD: 0 %
BILIRUB SERPL-MCNC: 0.5 MG/DL (ref 0.2–1)
BILIRUB UR QL STRIP: NEGATIVE
BUN SERPL-MCNC: <5 MG/DL (ref 6–20)
BUN/CREAT SERPL: ABNORMAL
CALCIUM SERPL-MCNC: 8.1 MG/DL (ref 8.4–10.2)
CALCIUM SERPL-MCNC: 8.4 MG/DL (ref 8.4–10.2)
CALCIUM SERPL-MCNC: 9 MG/DL (ref 8.4–10.2)
CALCIUM SERPL-MCNC: 9.1 MG/DL (ref 8.4–10.2)
CHLORIDE SERPL-SCNC: 105 MMOL/L (ref 97–110)
CHLORIDE SERPL-SCNC: 106 MMOL/L (ref 97–110)
CHLORIDE SERPL-SCNC: 109 MMOL/L (ref 97–110)
CHLORIDE SERPL-SCNC: 109 MMOL/L (ref 97–110)
CO2 SERPL-SCNC: 13 MMOL/L (ref 21–32)
CO2 SERPL-SCNC: 18 MMOL/L (ref 21–32)
CO2 SERPL-SCNC: 18 MMOL/L (ref 21–32)
CO2 SERPL-SCNC: <10 MMOL/L (ref 21–32)
COLOR UR: ABNORMAL
CREAT SERPL-MCNC: 0.41 MG/DL (ref 0.51–0.95)
CREAT SERPL-MCNC: 0.44 MG/DL (ref 0.51–0.95)
CREAT SERPL-MCNC: 0.44 MG/DL (ref 0.51–0.95)
CREAT SERPL-MCNC: 0.47 MG/DL (ref 0.51–0.95)
DEPRECATED RDW RBC: 42.2 FL (ref 39–50)
EGFRCR SERPLBLD CKD-EPI 2021: >90 ML/MIN/{1.73_M2}
EOSINOPHIL # BLD: 0 K/MCL (ref 0–0.5)
EOSINOPHIL NFR BLD: 0 %
ERYTHROCYTE [DISTWIDTH] IN BLOOD: 13.1 % (ref 11–15)
ETHANOL SERPL-MCNC: NORMAL MG/DL
FASTING DURATION TIME PATIENT: ABNORMAL H
GLOBULIN SER-MCNC: 3.7 G/DL (ref 2–4)
GLUCOSE BLDC GLUCOMTR-MCNC: 158 MG/DL (ref 70–99)
GLUCOSE BLDC GLUCOMTR-MCNC: 76 MG/DL (ref 70–99)
GLUCOSE BLDC GLUCOMTR-MCNC: 82 MG/DL (ref 70–99)
GLUCOSE BLDC GLUCOMTR-MCNC: 83 MG/DL (ref 70–99)
GLUCOSE SERPL-MCNC: 106 MG/DL (ref 70–99)
GLUCOSE SERPL-MCNC: 81 MG/DL (ref 70–99)
GLUCOSE SERPL-MCNC: 85 MG/DL (ref 70–99)
GLUCOSE SERPL-MCNC: 85 MG/DL (ref 70–99)
GLUCOSE UR STRIP-MCNC: NEGATIVE MG/DL
HCG SERPL-ACNC: <3 MUNITS/ML
HCO3 BLDA-SCNC: 13 MMOL/L (ref 22–28)
HCO3 BLDV-SCNC: 14 MMOL/L (ref 22–28)
HCT VFR BLD CALC: 42.4 % (ref 36–46.5)
HGB BLD-MCNC: 13.9 G/DL (ref 12–15.5)
HGB BLDA-MCNC: 11.6 G/DL (ref 12–15.5)
HGB BLDA-MCNC: 13.8 G/DL (ref 12–15.5)
HGB UR QL STRIP: NEGATIVE
HYALINE CASTS #/AREA URNS LPF: ABNORMAL /LPF
IMM GRANULOCYTES # BLD AUTO: 0 K/MCL (ref 0–0.2)
IMM GRANULOCYTES # BLD: 0 %
KETONES UR STRIP-MCNC: >80 MG/DL
LACTATE BLDV-SCNC: 1.1 MMOL/L (ref 0–2)
LEUKOCYTE ESTERASE UR QL STRIP: ABNORMAL
LIPASE SERPL-CCNC: 81 UNITS/L (ref 15–77)
LYMPHOCYTES # BLD: 1.6 K/MCL (ref 1–4.8)
LYMPHOCYTES NFR BLD: 18 %
MAGNESIUM SERPL-MCNC: 1.4 MG/DL (ref 1.7–2.4)
MAGNESIUM SERPL-MCNC: 2.1 MG/DL (ref 1.7–2.4)
MCH RBC QN AUTO: 29.1 PG (ref 26–34)
MCHC RBC AUTO-ENTMCNC: 32.8 G/DL (ref 32–36.5)
MCV RBC AUTO: 88.7 FL (ref 78–100)
MONOCYTES # BLD: 0.7 K/MCL (ref 0.3–0.9)
MONOCYTES NFR BLD: 7 %
MUCOUS THREADS URNS QL MICRO: PRESENT
NEUTROPHILS # BLD: 6.9 K/MCL (ref 1.8–7.7)
NEUTROPHILS NFR BLD: 75 %
NITRITE UR QL STRIP: NEGATIVE
NRBC BLD MANUAL-RTO: 0 /100 WBC
OXYHGB MFR BLDA: 98 % (ref 94–98)
OXYHGB MFR BLDV: 58 % (ref 60–80)
P AXIS (DEGREES): 47
PCO2 BLDA: 27 MM HG (ref 32–45)
PCO2 BLDV: 37 MM HG (ref 38–51)
PH BLDA: 7.29 UNITS (ref 7.35–7.45)
PH BLDV: 7.18 UNITS (ref 7.35–7.45)
PH UR STRIP: 5 [PH] (ref 5–7)
PHOSPHATE SERPL-MCNC: 2.5 MG/DL (ref 2.4–4.7)
PHOSPHATE SERPL-MCNC: 2.7 MG/DL (ref 2.4–4.7)
PLATELET # BLD AUTO: 249 K/MCL (ref 140–450)
PO2 BLDA: 176 MM HG (ref 83–108)
PO2 BLDV: 40 MM HG (ref 35–42)
POTASSIUM SERPL-SCNC: 3.7 MMOL/L (ref 3.4–5.1)
POTASSIUM SERPL-SCNC: 3.8 MMOL/L (ref 3.4–5.1)
POTASSIUM SERPL-SCNC: 4.8 MMOL/L (ref 3.4–5.1)
POTASSIUM SERPL-SCNC: 5.6 MMOL/L (ref 3.4–5.1)
PR-INTERVAL (MSEC): 144
PROT SERPL-MCNC: 8.3 G/DL (ref 6.4–8.2)
PROT UR STRIP-MCNC: 30 MG/DL
QRS-INTERVAL (MSEC): 90
QT-INTERVAL (MSEC): 342
QTC: 471
R AXIS (DEGREES): 56
RBC # BLD: 4.78 MIL/MCL (ref 4–5.2)
RBC #/AREA URNS HPF: ABNORMAL /HPF
REPORT TEXT: NORMAL
SAO2 % BLDA: 100 % (ref 95–99)
SAO2 % BLDV: 59 % (ref 60–80)
SODIUM SERPL-SCNC: 134 MMOL/L (ref 135–145)
SODIUM SERPL-SCNC: 136 MMOL/L (ref 135–145)
SODIUM SERPL-SCNC: 138 MMOL/L (ref 135–145)
SODIUM SERPL-SCNC: 139 MMOL/L (ref 135–145)
SP GR UR STRIP: 1.02 (ref 1–1.03)
SQUAMOUS #/AREA URNS HPF: ABNORMAL /HPF
T AXIS (DEGREES): -16
UROBILINOGEN UR STRIP-MCNC: 0.2 MG/DL
VENTRICULAR RATE EKG/MIN (BPM): 114
WBC # BLD: 9.3 K/MCL (ref 4.2–11)
WBC #/AREA URNS HPF: ABNORMAL /HPF

## 2024-11-10 PROCEDURE — 80048 BASIC METABOLIC PNL TOTAL CA: CPT

## 2024-11-10 PROCEDURE — 93005 ELECTROCARDIOGRAM TRACING: CPT

## 2024-11-10 PROCEDURE — 10004651 HB RX, NO CHARGE ITEM

## 2024-11-10 PROCEDURE — 10002800 HB RX 250 W HCPCS

## 2024-11-10 PROCEDURE — 82803 BLOOD GASES ANY COMBINATION: CPT

## 2024-11-10 PROCEDURE — 83735 ASSAY OF MAGNESIUM: CPT | Performed by: INTERNAL MEDICINE

## 2024-11-10 PROCEDURE — 10002801 HB RX 250 W/O HCPCS: Performed by: INTERNAL MEDICINE

## 2024-11-10 PROCEDURE — 83735 ASSAY OF MAGNESIUM: CPT

## 2024-11-10 PROCEDURE — 96374 THER/PROPH/DIAG INJ IV PUSH: CPT

## 2024-11-10 PROCEDURE — 82010 KETONE BODYS QUAN: CPT

## 2024-11-10 PROCEDURE — 90792 PSYCH DIAG EVAL W/MED SRVCS: CPT | Performed by: PSYCHIATRY & NEUROLOGY

## 2024-11-10 PROCEDURE — 84100 ASSAY OF PHOSPHORUS: CPT

## 2024-11-10 PROCEDURE — 80053 COMPREHEN METABOLIC PANEL: CPT

## 2024-11-10 PROCEDURE — 10002807 HB RX 258: Performed by: INTERNAL MEDICINE

## 2024-11-10 PROCEDURE — 82962 GLUCOSE BLOOD TEST: CPT

## 2024-11-10 PROCEDURE — 83605 ASSAY OF LACTIC ACID: CPT

## 2024-11-10 PROCEDURE — 82077 ASSAY SPEC XCP UR&BREATH IA: CPT | Performed by: STUDENT IN AN ORGANIZED HEALTH CARE EDUCATION/TRAINING PROGRAM

## 2024-11-10 PROCEDURE — 10000008 HB ROOM CHARGE ICU OR CCU

## 2024-11-10 PROCEDURE — 87086 URINE CULTURE/COLONY COUNT: CPT | Performed by: STUDENT IN AN ORGANIZED HEALTH CARE EDUCATION/TRAINING PROGRAM

## 2024-11-10 PROCEDURE — 99223 1ST HOSP IP/OBS HIGH 75: CPT | Performed by: INTERNAL MEDICINE

## 2024-11-10 PROCEDURE — 84702 CHORIONIC GONADOTROPIN TEST: CPT | Performed by: STUDENT IN AN ORGANIZED HEALTH CARE EDUCATION/TRAINING PROGRAM

## 2024-11-10 PROCEDURE — 96375 TX/PRO/DX INJ NEW DRUG ADDON: CPT

## 2024-11-10 PROCEDURE — 84100 ASSAY OF PHOSPHORUS: CPT | Performed by: INTERNAL MEDICINE

## 2024-11-10 PROCEDURE — 81001 URINALYSIS AUTO W/SCOPE: CPT | Performed by: STUDENT IN AN ORGANIZED HEALTH CARE EDUCATION/TRAINING PROGRAM

## 2024-11-10 PROCEDURE — 96361 HYDRATE IV INFUSION ADD-ON: CPT

## 2024-11-10 PROCEDURE — 93010 ELECTROCARDIOGRAM REPORT: CPT | Performed by: INTERNAL MEDICINE

## 2024-11-10 PROCEDURE — 80048 BASIC METABOLIC PNL TOTAL CA: CPT | Performed by: INTERNAL MEDICINE

## 2024-11-10 PROCEDURE — 99285 EMERGENCY DEPT VISIT HI MDM: CPT

## 2024-11-10 PROCEDURE — 80048 BASIC METABOLIC PNL TOTAL CA: CPT | Performed by: STUDENT IN AN ORGANIZED HEALTH CARE EDUCATION/TRAINING PROGRAM

## 2024-11-10 PROCEDURE — 10002807 HB RX 258

## 2024-11-10 PROCEDURE — 10002803 HB RX 637: Performed by: PSYCHIATRY & NEUROLOGY

## 2024-11-10 PROCEDURE — 85025 COMPLETE CBC W/AUTO DIFF WBC: CPT

## 2024-11-10 PROCEDURE — 10002803 HB RX 637: Performed by: STUDENT IN AN ORGANIZED HEALTH CARE EDUCATION/TRAINING PROGRAM

## 2024-11-10 PROCEDURE — 10002807 HB RX 258: Performed by: STUDENT IN AN ORGANIZED HEALTH CARE EDUCATION/TRAINING PROGRAM

## 2024-11-10 PROCEDURE — 96372 THER/PROPH/DIAG INJ SC/IM: CPT

## 2024-11-10 PROCEDURE — 83690 ASSAY OF LIPASE: CPT

## 2024-11-10 PROCEDURE — 99291 CRITICAL CARE FIRST HOUR: CPT

## 2024-11-10 RX ORDER — METOCLOPRAMIDE HYDROCHLORIDE 5 MG/ML
10 INJECTION INTRAMUSCULAR; INTRAVENOUS ONCE
Status: COMPLETED | OUTPATIENT
Start: 2024-11-10 | End: 2024-11-10

## 2024-11-10 RX ORDER — SIMETHICONE 80 MG
80 TABLET,CHEWABLE ORAL 2 TIMES DAILY PRN
COMMUNITY
Start: 2024-08-22

## 2024-11-10 RX ORDER — METFORMIN HYDROCHLORIDE 500 MG/1
500 TABLET, EXTENDED RELEASE ORAL 2 TIMES DAILY WITH MEALS
Status: ON HOLD | COMMUNITY
Start: 2024-10-16 | End: 2024-11-15 | Stop reason: HOSPADM

## 2024-11-10 RX ORDER — ENOXAPARIN SODIUM 100 MG/ML
60 INJECTION SUBCUTANEOUS 2 TIMES DAILY
Status: DISCONTINUED | OUTPATIENT
Start: 2024-11-10 | End: 2024-11-15 | Stop reason: HOSPADM

## 2024-11-10 RX ORDER — ACETAMINOPHEN 325 MG/1
650 TABLET ORAL EVERY 4 HOURS PRN
Status: DISCONTINUED | OUTPATIENT
Start: 2024-11-10 | End: 2024-11-15 | Stop reason: HOSPADM

## 2024-11-10 RX ORDER — DIPHENHYDRAMINE HYDROCHLORIDE 50 MG/ML
12.5 INJECTION INTRAMUSCULAR; INTRAVENOUS ONCE
Status: COMPLETED | OUTPATIENT
Start: 2024-11-10 | End: 2024-11-10

## 2024-11-10 RX ORDER — CLOMIPRAMINE HYDROCHLORIDE 50 MG/1
100 CAPSULE ORAL AT BEDTIME
COMMUNITY
Start: 2024-10-10

## 2024-11-10 RX ORDER — 0.9 % SODIUM CHLORIDE 0.9 %
2 VIAL (ML) INJECTION EVERY 12 HOURS SCHEDULED
Status: DISCONTINUED | OUTPATIENT
Start: 2024-11-10 | End: 2024-11-15 | Stop reason: HOSPADM

## 2024-11-10 RX ORDER — POTASSIUM CHLORIDE 14.9 MG/ML
20 INJECTION INTRAVENOUS ONCE
Status: COMPLETED | OUTPATIENT
Start: 2024-11-10 | End: 2024-11-10

## 2024-11-10 RX ORDER — PRAVASTATIN SODIUM 80 MG/1
80 TABLET ORAL NIGHTLY
COMMUNITY

## 2024-11-10 RX ORDER — FLUTICASONE PROPIONATE 50 MCG
1 SPRAY, SUSPENSION (ML) NASAL EVERY 12 HOURS
COMMUNITY
Start: 2024-07-26

## 2024-11-10 RX ORDER — NYSTATIN 100000 [USP'U]/G
POWDER TOPICAL 3 TIMES DAILY PRN
COMMUNITY

## 2024-11-10 RX ORDER — NALTREXONE HYDROCHLORIDE 50 MG/1
50 TABLET, FILM COATED ORAL DAILY
COMMUNITY
Start: 2024-11-08

## 2024-11-10 RX ORDER — ENOXAPARIN SODIUM 100 MG/ML
40 INJECTION SUBCUTANEOUS EVERY 24 HOURS
Status: DISCONTINUED | OUTPATIENT
Start: 2024-11-10 | End: 2024-11-10

## 2024-11-10 RX ORDER — TRIHEXYPHENIDYL HYDROCHLORIDE 2 MG/1
4 TABLET ORAL 3 TIMES DAILY
COMMUNITY
Start: 2024-10-03

## 2024-11-10 RX ORDER — 0.9 % SODIUM CHLORIDE 0.9 %
10 VIAL (ML) INJECTION PRN
Status: DISCONTINUED | OUTPATIENT
Start: 2024-11-10 | End: 2024-11-15 | Stop reason: HOSPADM

## 2024-11-10 RX ORDER — ACETAMINOPHEN 325 MG/1
650 TABLET ORAL ONCE
Status: COMPLETED | OUTPATIENT
Start: 2024-11-10 | End: 2024-11-10

## 2024-11-10 RX ORDER — OXCARBAZEPINE 300 MG/1
600 TABLET, FILM COATED ORAL EVERY MORNING
Status: DISCONTINUED | OUTPATIENT
Start: 2024-11-11 | End: 2024-11-15 | Stop reason: HOSPADM

## 2024-11-10 RX ORDER — METHYLPHENIDATE HYDROCHLORIDE 36 MG/1
36 TABLET ORAL EVERY MORNING
COMMUNITY
Start: 2024-10-16

## 2024-11-10 RX ORDER — SODIUM CHLORIDE 9 MG/ML
INJECTION, SOLUTION INTRAVENOUS CONTINUOUS PRN
Status: DISCONTINUED | OUTPATIENT
Start: 2024-11-10 | End: 2024-11-15 | Stop reason: HOSPADM

## 2024-11-10 RX ORDER — HYDROXYZINE HYDROCHLORIDE 25 MG/1
25 TABLET, FILM COATED ORAL 4 TIMES DAILY PRN
Status: DISCONTINUED | OUTPATIENT
Start: 2024-11-10 | End: 2024-11-15 | Stop reason: HOSPADM

## 2024-11-10 RX ORDER — SODIUM FLUORIDE 5 MG/G
1 GEL, DENTIFRICE DENTAL 2 TIMES DAILY PRN
COMMUNITY
Start: 2024-07-26

## 2024-11-10 RX ORDER — DEXTROSE MONOHYDRATE AND SODIUM CHLORIDE 5; .9 G/100ML; G/100ML
INJECTION, SOLUTION INTRAVENOUS CONTINUOUS
Status: DISCONTINUED | OUTPATIENT
Start: 2024-11-10 | End: 2024-11-10

## 2024-11-10 RX ORDER — PNV NO.95/FERROUS FUM/FOLIC AC 28MG-0.8MG
1 TABLET ORAL DAILY
COMMUNITY
Start: 2024-07-26

## 2024-11-10 RX ORDER — OXCARBAZEPINE 300 MG/1
TABLET, FILM COATED ORAL
Status: ON HOLD | COMMUNITY
End: 2024-11-15 | Stop reason: HOSPADM

## 2024-11-10 RX ORDER — ENOXAPARIN SODIUM 100 MG/ML
40 INJECTION SUBCUTANEOUS 2 TIMES DAILY
Status: DISCONTINUED | OUTPATIENT
Start: 2024-11-10 | End: 2024-11-10

## 2024-11-10 RX ORDER — POTASSIUM CHLORIDE 1500 MG/1
20 TABLET, EXTENDED RELEASE ORAL ONCE
Status: COMPLETED | OUTPATIENT
Start: 2024-11-11 | End: 2024-11-11

## 2024-11-10 RX ORDER — LIOTHYRONINE SODIUM 5 UG/1
10 TABLET ORAL DAILY
COMMUNITY
Start: 2024-10-04 | End: 2025-01-02

## 2024-11-10 RX ORDER — ACETAMINOPHEN 500 MG
1000 TABLET ORAL 4 TIMES DAILY
Status: ON HOLD | COMMUNITY
End: 2024-11-15 | Stop reason: HOSPADM

## 2024-11-10 RX ORDER — CLOMIPRAMINE HYDROCHLORIDE 25 MG/1
100 CAPSULE ORAL NIGHTLY
Status: DISCONTINUED | OUTPATIENT
Start: 2024-11-10 | End: 2024-11-15 | Stop reason: HOSPADM

## 2024-11-10 RX ORDER — GABAPENTIN 100 MG/1
100 CAPSULE ORAL 2 TIMES DAILY PRN
COMMUNITY
Start: 2024-10-04

## 2024-11-10 RX ORDER — DOCUSATE SODIUM 100 MG/1
100 CAPSULE, LIQUID FILLED ORAL 3 TIMES DAILY PRN
COMMUNITY
Start: 2024-08-23

## 2024-11-10 RX ORDER — DEXTROSE, SODIUM CHLORIDE, SODIUM LACTATE, POTASSIUM CHLORIDE, AND CALCIUM CHLORIDE 5; .6; .31; .03; .02 G/100ML; G/100ML; G/100ML; G/100ML; G/100ML
INJECTION, SOLUTION INTRAVENOUS CONTINUOUS
Status: DISCONTINUED | OUTPATIENT
Start: 2024-11-10 | End: 2024-11-13

## 2024-11-10 RX ORDER — THIAMINE HYDROCHLORIDE 100 MG/ML
100 INJECTION, SOLUTION INTRAMUSCULAR; INTRAVENOUS DAILY
Status: DISCONTINUED | OUTPATIENT
Start: 2024-11-11 | End: 2024-11-13

## 2024-11-10 RX ORDER — METOPROLOL TARTRATE 25 MG/1
25 TABLET, FILM COATED ORAL 2 TIMES DAILY
COMMUNITY

## 2024-11-10 RX ORDER — OXCARBAZEPINE 600 MG/1
600 TABLET, FILM COATED ORAL 2 TIMES DAILY
Status: ON HOLD | COMMUNITY
Start: 2024-10-10 | End: 2024-11-15 | Stop reason: HOSPADM

## 2024-11-10 RX ORDER — OXCARBAZEPINE 300 MG/1
900 TABLET, FILM COATED ORAL AT BEDTIME
Status: DISCONTINUED | OUTPATIENT
Start: 2024-11-10 | End: 2024-11-15 | Stop reason: HOSPADM

## 2024-11-10 RX ORDER — MELOXICAM 15 MG/1
15 TABLET ORAL DAILY
COMMUNITY

## 2024-11-10 RX ADMIN — SODIUM CHLORIDE, SODIUM LACTATE, POTASSIUM CHLORIDE, CALCIUM CHLORIDE AND DEXTROSE MONOHYDRATE: 5; 600; 310; 30; 20 INJECTION, SOLUTION INTRAVENOUS at 23:33

## 2024-11-10 RX ADMIN — SODIUM CHLORIDE, SODIUM LACTATE, POTASSIUM CHLORIDE, CALCIUM CHLORIDE AND DEXTROSE MONOHYDRATE: 5; 600; 310; 30; 20 INJECTION, SOLUTION INTRAVENOUS at 14:12

## 2024-11-10 RX ADMIN — DEXTROSE AND SODIUM CHLORIDE: 5; 900 INJECTION, SOLUTION INTRAVENOUS at 11:39

## 2024-11-10 RX ADMIN — ENOXAPARIN SODIUM 60 MG: 100 INJECTION SUBCUTANEOUS at 20:42

## 2024-11-10 RX ADMIN — Medication 100 MG: at 11:39

## 2024-11-10 RX ADMIN — ACETAMINOPHEN 650 MG: 325 TABLET ORAL at 10:07

## 2024-11-10 RX ADMIN — MAGNESIUM SULFATE IN WATER 2 G: 40 INJECTION, SOLUTION INTRAVENOUS at 19:25

## 2024-11-10 RX ADMIN — DIPHENHYDRAMINE HYDROCHLORIDE 12.5 MG: 50 INJECTION, SOLUTION INTRAMUSCULAR; INTRAVENOUS at 10:09

## 2024-11-10 RX ADMIN — POTASSIUM CHLORIDE 20 MEQ: 14.9 INJECTION, SOLUTION INTRAVENOUS at 18:48

## 2024-11-10 RX ADMIN — OXCARBAZEPINE 900 MG: 300 TABLET, FILM COATED ORAL at 20:42

## 2024-11-10 RX ADMIN — SODIUM CHLORIDE, POTASSIUM CHLORIDE, SODIUM LACTATE AND CALCIUM CHLORIDE 1000 ML: 600; 310; 30; 20 INJECTION, SOLUTION INTRAVENOUS at 12:30

## 2024-11-10 RX ADMIN — SODIUM BICARBONATE 50 MEQ: 84 INJECTION, SOLUTION INTRAVENOUS at 13:56

## 2024-11-10 RX ADMIN — POTASSIUM CHLORIDE 20 MEQ: 14.9 INJECTION, SOLUTION INTRAVENOUS at 20:38

## 2024-11-10 RX ADMIN — CLOMIPRAMINE HYDROCHLORIDE 100 MG: 25 CAPSULE ORAL at 20:42

## 2024-11-10 RX ADMIN — METOCLOPRAMIDE HYDROCHLORIDE 10 MG: 5 INJECTION INTRAMUSCULAR; INTRAVENOUS at 10:07

## 2024-11-10 RX ADMIN — MAGNESIUM SULFATE IN WATER 2 G: 40 INJECTION, SOLUTION INTRAVENOUS at 18:04

## 2024-11-10 RX ADMIN — ACETAMINOPHEN 650 MG: 325 TABLET ORAL at 20:48

## 2024-11-10 RX ADMIN — SODIUM CHLORIDE, POTASSIUM CHLORIDE, SODIUM LACTATE AND CALCIUM CHLORIDE 1000 ML: 600; 310; 30; 20 INJECTION, SOLUTION INTRAVENOUS at 13:16

## 2024-11-10 SDOH — SOCIAL STABILITY: SOCIAL INSECURITY: HOW OFTEN DOES ANYONE, INCLUDING FAMILY AND FRIENDS, THREATEN YOU WITH HARM?: NEVER

## 2024-11-10 SDOH — SOCIAL STABILITY: SOCIAL NETWORK
HOW OFTEN DO YOU SEE OR TALK TO PEOPLE THAT YOU CARE ABOUT AND FEEL CLOSE TO? (FOR EXAMPLE: TALKING TO FRIENDS ON THE PHONE, VISITING FRIENDS OR FAMILY, GOING TO CHURCH OR CLUB MEETINGS): 5 OR MORE TIMES A WEEK

## 2024-11-10 SDOH — ECONOMIC STABILITY: HOUSING INSECURITY: WHAT IS YOUR LIVING SITUATION TODAY?: I HAVE A STEADY PLACE TO LIVE

## 2024-11-10 SDOH — ECONOMIC STABILITY: GENERAL: WOULD YOU LIKE HELP WITH ANY OF THE FOLLOWING NEEDS?: I DON'T WANT HELP WITH ANY OF THESE

## 2024-11-10 SDOH — ECONOMIC STABILITY: FOOD INSECURITY: WITHIN THE PAST 12 MONTHS, THE FOOD YOU BOUGHT JUST DIDN'T LAST AND YOU DIDN'T HAVE MONEY TO GET MORE.: NEVER TRUE

## 2024-11-10 SDOH — ECONOMIC STABILITY: HOUSING INSECURITY: WHAT IS YOUR LIVING SITUATION TODAY?: HOUSE

## 2024-11-10 SDOH — HEALTH STABILITY: GENERAL
BECAUSE OF A PHYSICAL, MENTAL, OR EMOTIONAL CONDITION, DO YOU HAVE SERIOUS DIFFICULTY CONCENTRATING, REMEMBERING OR MAKING DECISIONS?: NO

## 2024-11-10 SDOH — SOCIAL STABILITY: SOCIAL INSECURITY: HOW OFTEN DOES ANYONE, INCLUDING FAMILY AND FRIENDS, INSULT OR TALK DOWN TO YOU?: NEVER

## 2024-11-10 SDOH — SOCIAL STABILITY: SOCIAL INSECURITY: HOW OFTEN DOES ANYONE, INCLUDING FAMILY AND FRIENDS, PHYSICALLY HURT YOU?: NEVER

## 2024-11-10 SDOH — ECONOMIC STABILITY: TRANSPORTATION INSECURITY
IN THE PAST 12 MONTHS, HAS LACK OF RELIABLE TRANSPORTATION KEPT YOU FROM MEDICAL APPOINTMENTS, MEETINGS, WORK OR FROM GETTING THINGS NEEDED FOR DAILY LIVING?: NO

## 2024-11-10 SDOH — HEALTH STABILITY: PHYSICAL HEALTH: DO YOU HAVE DIFFICULTY DRESSING OR BATHING?: NO

## 2024-11-10 SDOH — ECONOMIC STABILITY: INCOME INSECURITY: IN THE PAST 12 MONTHS, HAS THE ELECTRIC, GAS, OIL, OR WATER COMPANY THREATENED TO SHUT OFF SERVICE IN YOUR HOME?: NO

## 2024-11-10 SDOH — HEALTH STABILITY: PHYSICAL HEALTH: DO YOU HAVE SERIOUS DIFFICULTY WALKING OR CLIMBING STAIRS?: NO

## 2024-11-10 SDOH — SOCIAL STABILITY: SOCIAL INSECURITY: HOW OFTEN DOES ANYONE, INCLUDING FAMILY AND FRIENDS, SCREAM OR CURSE AT YOU?: NEVER

## 2024-11-10 SDOH — ECONOMIC STABILITY: HOUSING INSECURITY: DO YOU HAVE PROBLEMS WITH ANY OF THE FOLLOWING?: NONE OF THE ABOVE

## 2024-11-10 SDOH — HEALTH STABILITY: GENERAL: BECAUSE OF A PHYSICAL, MENTAL, OR EMOTIONAL CONDITION, DO YOU HAVE DIFFICULTY DOING ERRANDS ALONE?: NO

## 2024-11-10 SDOH — ECONOMIC STABILITY: HOUSING INSECURITY: WHAT IS YOUR LIVING SITUATION TODAY?: PARENT

## 2024-11-10 SDOH — ECONOMIC STABILITY: GENERAL

## 2024-11-10 ASSESSMENT — PAIN SCALES - GENERAL
PAINLEVEL_OUTOF10: 7
PAINLEVEL_OUTOF10: 6
PAINLEVEL_OUTOF10: 0
PAINLEVEL_OUTOF10: 6

## 2024-11-10 ASSESSMENT — ACTIVITIES OF DAILY LIVING (ADL)
RECENT_DECLINE_ADL: NO
BATHING: INDEPENDENT
TOILETING: INDEPENDENT
FEEDING: INDEPENDENT
ADL_SCORE: 24
ADL_BEFORE_ADMISSION: INDEPENDENT
DRESSING: INDEPENDENT
ADL_SHORT_OF_BREATH: NO

## 2024-11-10 ASSESSMENT — ENCOUNTER SYMPTOMS
NAUSEA: 0
NAUSEA: 1
DIARRHEA: 0
ORTHOPNEA: 0
VOMITING: 0
DEPRESSION: 0
DIAPHORESIS: 0
VOMITING: 1
CHEST TIGHTNESS: 0
COUGH: 0
HEADACHES: 0
HEARTBURN: 0
BACK PAIN: 0
DIZZINESS: 0
FEVER: 0
SORE THROAT: 0
BLURRED VISION: 0
WEIGHT LOSS: 1
WEAKNESS: 0
CHILLS: 0
CONSTIPATION: 0
WHEEZING: 0
ABDOMINAL PAIN: 0
SHORTNESS OF BREATH: 0

## 2024-11-10 ASSESSMENT — PATIENT HEALTH QUESTIONNAIRE - PHQ9
IS PATIENT ABLE TO COMPLETE PHQ2 OR PHQ9: YES
CLINICAL INTERPRETATION OF PHQ2 SCORE: NO FURTHER SCREENING NEEDED
SUM OF ALL RESPONSES TO PHQ9 QUESTIONS 1 AND 2: 0

## 2024-11-11 LAB
ALBUMIN SERPL-MCNC: 3.7 G/DL (ref 3.4–5)
ALBUMIN/GLOB SERPL: 1.2 {RATIO} (ref 1–2.4)
ALP SERPL-CCNC: 68 UNITS/L (ref 45–117)
ALT SERPL-CCNC: 10 UNITS/L
ANION GAP SERPL CALC-SCNC: 12 MMOL/L (ref 7–19)
ANION GAP SERPL CALC-SCNC: 14 MMOL/L (ref 7–19)
AST SERPL-CCNC: 17 UNITS/L
BACTERIA UR CULT: NORMAL
BASOPHILS # BLD: 0 K/MCL (ref 0–0.3)
BASOPHILS NFR BLD: 1 %
BILIRUB SERPL-MCNC: 0.6 MG/DL (ref 0.2–1)
BUN SERPL-MCNC: <5 MG/DL (ref 6–20)
BUN SERPL-MCNC: <5 MG/DL (ref 6–20)
BUN/CREAT SERPL: ABNORMAL
BUN/CREAT SERPL: ABNORMAL
CA-I ADJ PH7.4 SERPL-SCNC: 1.17 MMOL/L (ref 1.15–1.29)
CA-I SERPL ISE-SCNC: 1.2 MMOL/L (ref 1.15–1.29)
CALCIUM SERPL-MCNC: 8.4 MG/DL (ref 8.4–10.2)
CALCIUM SERPL-MCNC: 8.7 MG/DL (ref 8.4–10.2)
CHLORIDE SERPL-SCNC: 107 MMOL/L (ref 97–110)
CHLORIDE SERPL-SCNC: 108 MMOL/L (ref 97–110)
CO2 SERPL-SCNC: 22 MMOL/L (ref 21–32)
CO2 SERPL-SCNC: 23 MMOL/L (ref 21–32)
CREAT SERPL-MCNC: 0.41 MG/DL (ref 0.51–0.95)
CREAT SERPL-MCNC: 0.51 MG/DL (ref 0.51–0.95)
DEPRECATED RDW RBC: 42.5 FL (ref 39–50)
EGFRCR SERPLBLD CKD-EPI 2021: >90 ML/MIN/{1.73_M2}
EGFRCR SERPLBLD CKD-EPI 2021: >90 ML/MIN/{1.73_M2}
EOSINOPHIL # BLD: 0.2 K/MCL (ref 0–0.5)
EOSINOPHIL NFR BLD: 3 %
ERYTHROCYTE [DISTWIDTH] IN BLOOD: 13.3 % (ref 11–15)
FASTING DURATION TIME PATIENT: ABNORMAL H
FASTING DURATION TIME PATIENT: ABNORMAL H
GLOBULIN SER-MCNC: 3.1 G/DL (ref 2–4)
GLUCOSE BLDC GLUCOMTR-MCNC: 102 MG/DL (ref 70–99)
GLUCOSE BLDC GLUCOMTR-MCNC: 110 MG/DL (ref 70–99)
GLUCOSE BLDC GLUCOMTR-MCNC: 111 MG/DL (ref 70–99)
GLUCOSE BLDC GLUCOMTR-MCNC: 120 MG/DL (ref 70–99)
GLUCOSE BLDC GLUCOMTR-MCNC: 99 MG/DL (ref 70–99)
GLUCOSE SERPL-MCNC: 105 MG/DL (ref 70–99)
GLUCOSE SERPL-MCNC: 105 MG/DL (ref 70–99)
HCT VFR BLD CALC: 35.9 % (ref 36–51)
HGB BLD-MCNC: 11.8 G/DL (ref 12–17)
IMM GRANULOCYTES # BLD AUTO: 0 K/MCL (ref 0–0.2)
IMM GRANULOCYTES # BLD: 0 %
LYMPHOCYTES # BLD: 2.5 K/MCL (ref 1–4.8)
LYMPHOCYTES NFR BLD: 44 %
MAGNESIUM SERPL-MCNC: 1.8 MG/DL (ref 1.7–2.4)
MAGNESIUM SERPL-MCNC: 2 MG/DL (ref 1.7–2.4)
MCH RBC QN AUTO: 28.7 PG (ref 26–34)
MCHC RBC AUTO-ENTMCNC: 32.9 G/DL (ref 32–36.5)
MCV RBC AUTO: 87.3 FL (ref 78–100)
MONOCYTES # BLD: 0.5 K/MCL (ref 0.3–0.9)
MONOCYTES NFR BLD: 9 %
NEUTROPHILS # BLD: 2.6 K/MCL (ref 1.8–7.7)
NEUTROPHILS NFR BLD: 43 %
NRBC BLD MANUAL-RTO: 0 /100 WBC
PHOSPHATE SERPL-MCNC: 2.2 MG/DL (ref 2.4–4.7)
PHOSPHATE SERPL-MCNC: 2.6 MG/DL (ref 2.4–4.7)
PLATELET # BLD AUTO: 231 K/MCL (ref 140–450)
POTASSIUM SERPL-SCNC: 3.7 MMOL/L (ref 3.4–5.1)
POTASSIUM SERPL-SCNC: 3.8 MMOL/L (ref 3.4–5.1)
PROT SERPL-MCNC: 6.8 G/DL (ref 6.4–8.2)
RBC # BLD: 4.11 MIL/MCL (ref 4–5.2)
SODIUM SERPL-SCNC: 138 MMOL/L (ref 135–145)
SODIUM SERPL-SCNC: 140 MMOL/L (ref 135–145)
WBC # BLD: 5.8 K/MCL (ref 4.2–11)

## 2024-11-11 PROCEDURE — 84100 ASSAY OF PHOSPHORUS: CPT

## 2024-11-11 PROCEDURE — 10004651 HB RX, NO CHARGE ITEM

## 2024-11-11 PROCEDURE — 96372 THER/PROPH/DIAG INJ SC/IM: CPT

## 2024-11-11 PROCEDURE — 83735 ASSAY OF MAGNESIUM: CPT

## 2024-11-11 PROCEDURE — 99233 SBSQ HOSP IP/OBS HIGH 50: CPT | Performed by: INTERNAL MEDICINE

## 2024-11-11 PROCEDURE — 10002803 HB RX 637

## 2024-11-11 PROCEDURE — 10002800 HB RX 250 W HCPCS

## 2024-11-11 PROCEDURE — 85025 COMPLETE CBC W/AUTO DIFF WBC: CPT

## 2024-11-11 PROCEDURE — 10002800 HB RX 250 W HCPCS: Performed by: INTERNAL MEDICINE

## 2024-11-11 PROCEDURE — 10002803 HB RX 637: Performed by: PSYCHIATRY & NEUROLOGY

## 2024-11-11 PROCEDURE — 80048 BASIC METABOLIC PNL TOTAL CA: CPT

## 2024-11-11 PROCEDURE — 80053 COMPREHEN METABOLIC PANEL: CPT

## 2024-11-11 PROCEDURE — 82330 ASSAY OF CALCIUM: CPT | Performed by: INTERNAL MEDICINE

## 2024-11-11 PROCEDURE — 10004651 HB RX, NO CHARGE ITEM: Performed by: INTERNAL MEDICINE

## 2024-11-11 PROCEDURE — 10006031 HB ROOM CHARGE TELEMETRY

## 2024-11-11 PROCEDURE — 10002807 HB RX 258

## 2024-11-11 PROCEDURE — 10002801 HB RX 250 W/O HCPCS

## 2024-11-11 RX ORDER — POTASSIUM CHLORIDE 1500 MG/1
40 TABLET, EXTENDED RELEASE ORAL ONCE
Status: COMPLETED | OUTPATIENT
Start: 2024-11-11 | End: 2024-11-11

## 2024-11-11 RX ORDER — POLYETHYLENE GLYCOL 3350 17 G/17G
17 POWDER, FOR SOLUTION ORAL DAILY PRN
Status: DISCONTINUED | OUTPATIENT
Start: 2024-11-11 | End: 2024-11-15 | Stop reason: HOSPADM

## 2024-11-11 RX ORDER — AMOXICILLIN 250 MG
1 CAPSULE ORAL NIGHTLY PRN
Status: DISCONTINUED | OUTPATIENT
Start: 2024-11-11 | End: 2024-11-15 | Stop reason: HOSPADM

## 2024-11-11 RX ADMIN — CLOMIPRAMINE HYDROCHLORIDE 100 MG: 25 CAPSULE ORAL at 21:39

## 2024-11-11 RX ADMIN — POTASSIUM CHLORIDE 40 MEQ: 1500 TABLET, EXTENDED RELEASE ORAL at 11:48

## 2024-11-11 RX ADMIN — POTASSIUM PHOSPHATE, MONOBASIC POTASSIUM PHOSPHATE, DIBASIC 20 MMOL: 224; 236 INJECTION, SOLUTION, CONCENTRATE INTRAVENOUS at 22:40

## 2024-11-11 RX ADMIN — SODIUM CHLORIDE 10 ML: 9 INJECTION, SOLUTION INTRAVENOUS at 22:32

## 2024-11-11 RX ADMIN — LURASIDONE HYDROCHLORIDE 60 MG: 40 TABLET, FILM COATED ORAL at 09:58

## 2024-11-11 RX ADMIN — ENOXAPARIN SODIUM 60 MG: 100 INJECTION SUBCUTANEOUS at 21:44

## 2024-11-11 RX ADMIN — ENOXAPARIN SODIUM 60 MG: 100 INJECTION SUBCUTANEOUS at 09:48

## 2024-11-11 RX ADMIN — SODIUM CHLORIDE, PRESERVATIVE FREE 2 ML: 5 INJECTION INTRAVENOUS at 09:42

## 2024-11-11 RX ADMIN — OXCARBAZEPINE 600 MG: 300 TABLET, FILM COATED ORAL at 06:19

## 2024-11-11 RX ADMIN — THIAMINE HYDROCHLORIDE 100 MG: 100 INJECTION, SOLUTION INTRAMUSCULAR; INTRAVENOUS at 09:46

## 2024-11-11 RX ADMIN — OXCARBAZEPINE 900 MG: 300 TABLET, FILM COATED ORAL at 21:41

## 2024-11-11 RX ADMIN — POTASSIUM CHLORIDE 20 MEQ: 1500 TABLET, EXTENDED RELEASE ORAL at 00:29

## 2024-11-11 RX ADMIN — MAGNESIUM SULFATE IN WATER 2 G: 40 INJECTION, SOLUTION INTRAVENOUS at 11:46

## 2024-11-11 RX ADMIN — SODIUM CHLORIDE, PRESERVATIVE FREE 2 ML: 5 INJECTION INTRAVENOUS at 21:39

## 2024-11-11 RX ADMIN — SODIUM CHLORIDE, PRESERVATIVE FREE 2 ML: 5 INJECTION INTRAVENOUS at 21:38

## 2024-11-11 SDOH — ECONOMIC STABILITY: HOUSING INSECURITY: WHAT IS YOUR LIVING SITUATION TODAY?: I HAVE A STEADY PLACE TO LIVE

## 2024-11-11 ASSESSMENT — ENCOUNTER SYMPTOMS
FATIGUE: 1
GASTROINTESTINAL NEGATIVE: 1
ALLERGIC/IMMUNOLOGIC NEGATIVE: 1
HEMATOLOGIC/LYMPHATIC NEGATIVE: 1
EYES NEGATIVE: 1
ENDOCRINE NEGATIVE: 1
NEUROLOGICAL NEGATIVE: 1
VOMITING: 0
NAUSEA: 0
PSYCHIATRIC NEGATIVE: 1
RESPIRATORY NEGATIVE: 1

## 2024-11-11 ASSESSMENT — COGNITIVE AND FUNCTIONAL STATUS - GENERAL
BECAUSE OF A PHYSICAL, MENTAL, OR EMOTIONAL CONDITION, DO YOU HAVE SERIOUS DIFFICULTY CONCENTRATING, REMEMBERING OR MAKING DECISIONS: NO
BECAUSE OF A PHYSICAL, MENTAL, OR EMOTIONAL CONDITION, DO YOU HAVE DIFFICULTY DOING ERRANDS ALONE: NO
DO YOU HAVE DIFFICULTY DRESSING OR BATHING: NO
DO YOU HAVE SERIOUS DIFFICULTY WALKING OR CLIMBING STAIRS: NO

## 2024-11-11 ASSESSMENT — PAIN SCALES - GENERAL: PAINLEVEL_OUTOF10: 0

## 2024-11-12 PROBLEM — E03.9 ACQUIRED HYPOTHYROIDISM: Status: ACTIVE | Noted: 2024-11-12

## 2024-11-12 PROBLEM — Z78.9 FEMALE-TO-MALE TRANSGENDER PERSON: Status: ACTIVE | Noted: 2024-11-12

## 2024-11-12 PROBLEM — T73.0XXA STARVATION KETOACIDOSIS: Status: ACTIVE | Noted: 2024-11-12

## 2024-11-12 PROBLEM — E66.01 CLASS 3 SEVERE OBESITY WITH BODY MASS INDEX (BMI) OF 50.0 TO 59.9 IN ADULT (CMD): Status: ACTIVE | Noted: 2024-11-12

## 2024-11-12 PROBLEM — E83.42 HYPOMAGNESEMIA: Status: ACTIVE | Noted: 2024-11-12

## 2024-11-12 PROBLEM — F50.012: Status: ACTIVE | Noted: 2024-11-12

## 2024-11-12 PROBLEM — E66.813 CLASS 3 SEVERE OBESITY WITH BODY MASS INDEX (BMI) OF 50.0 TO 59.9 IN ADULT (CMD): Status: ACTIVE | Noted: 2024-11-12

## 2024-11-12 PROBLEM — E87.29 STARVATION KETOACIDOSIS: Status: ACTIVE | Noted: 2024-11-12

## 2024-11-12 LAB
ALBUMIN SERPL-MCNC: 3.5 G/DL (ref 3.4–5)
ALBUMIN/GLOB SERPL: 1.1 {RATIO} (ref 1–2.4)
ALP SERPL-CCNC: 64 UNITS/L (ref 45–117)
ALT SERPL-CCNC: 14 UNITS/L
ANION GAP SERPL CALC-SCNC: 12 MMOL/L (ref 7–19)
ANION GAP SERPL CALC-SCNC: 13 MMOL/L (ref 7–19)
AST SERPL-CCNC: 12 UNITS/L
BASOPHILS # BLD: 0 K/MCL (ref 0–0.3)
BASOPHILS NFR BLD: 1 %
BILIRUB SERPL-MCNC: 0.3 MG/DL (ref 0.2–1)
BUN SERPL-MCNC: <5 MG/DL (ref 6–20)
BUN SERPL-MCNC: <5 MG/DL (ref 6–20)
BUN/CREAT SERPL: ABNORMAL
BUN/CREAT SERPL: ABNORMAL
CALCIUM SERPL-MCNC: 8.9 MG/DL (ref 8.4–10.2)
CALCIUM SERPL-MCNC: 8.9 MG/DL (ref 8.4–10.2)
CHLORIDE SERPL-SCNC: 106 MMOL/L (ref 97–110)
CHLORIDE SERPL-SCNC: 107 MMOL/L (ref 97–110)
CO2 SERPL-SCNC: 26 MMOL/L (ref 21–32)
CO2 SERPL-SCNC: 27 MMOL/L (ref 21–32)
CREAT SERPL-MCNC: 0.42 MG/DL (ref 0.51–0.95)
CREAT SERPL-MCNC: 0.45 MG/DL (ref 0.51–0.95)
DEPRECATED RDW RBC: 41.9 FL (ref 39–50)
EGFRCR SERPLBLD CKD-EPI 2021: >90 ML/MIN/{1.73_M2}
EGFRCR SERPLBLD CKD-EPI 2021: >90 ML/MIN/{1.73_M2}
EOSINOPHIL # BLD: 0.2 K/MCL (ref 0–0.5)
EOSINOPHIL NFR BLD: 4 %
ERYTHROCYTE [DISTWIDTH] IN BLOOD: 13.6 % (ref 11–15)
FASTING DURATION TIME PATIENT: ABNORMAL H
FASTING DURATION TIME PATIENT: ABNORMAL H
GLOBULIN SER-MCNC: 3.1 G/DL (ref 2–4)
GLUCOSE BLDC GLUCOMTR-MCNC: 104 MG/DL (ref 70–99)
GLUCOSE BLDC GLUCOMTR-MCNC: 111 MG/DL (ref 70–99)
GLUCOSE BLDC GLUCOMTR-MCNC: 122 MG/DL (ref 70–99)
GLUCOSE BLDC GLUCOMTR-MCNC: 123 MG/DL (ref 70–99)
GLUCOSE BLDC GLUCOMTR-MCNC: 86 MG/DL (ref 70–99)
GLUCOSE SERPL-MCNC: 107 MG/DL (ref 70–99)
GLUCOSE SERPL-MCNC: 115 MG/DL (ref 70–99)
HCT VFR BLD CALC: 34.5 % (ref 36–51)
HGB BLD-MCNC: 11.8 G/DL (ref 12–17)
IMM GRANULOCYTES # BLD AUTO: 0 K/MCL (ref 0–0.2)
IMM GRANULOCYTES # BLD: 0 %
LMWH PPP CHRO-ACNC: 0.31 UNITS/ML
LYMPHOCYTES # BLD: 2.5 K/MCL (ref 1–4.8)
LYMPHOCYTES NFR BLD: 40 %
MAGNESIUM SERPL-MCNC: 1.6 MG/DL (ref 1.7–2.4)
MAGNESIUM SERPL-MCNC: 2 MG/DL (ref 1.7–2.4)
MCH RBC QN AUTO: 29.3 PG (ref 26–34)
MCHC RBC AUTO-ENTMCNC: 34.2 G/DL (ref 32–36.5)
MCV RBC AUTO: 85.6 FL (ref 78–100)
MONOCYTES # BLD: 0.5 K/MCL (ref 0.3–0.9)
MONOCYTES NFR BLD: 9 %
NEUTROPHILS # BLD: 2.9 K/MCL (ref 1.8–7.7)
NEUTROPHILS NFR BLD: 46 %
NRBC BLD MANUAL-RTO: 0 /100 WBC
PHOSPHATE SERPL-MCNC: 2.4 MG/DL (ref 2.4–4.7)
PHOSPHATE SERPL-MCNC: 3 MG/DL (ref 2.4–4.7)
PLATELET # BLD AUTO: 224 K/MCL (ref 140–450)
POTASSIUM SERPL-SCNC: 3.6 MMOL/L (ref 3.4–5.1)
POTASSIUM SERPL-SCNC: 3.9 MMOL/L (ref 3.4–5.1)
PROT SERPL-MCNC: 6.6 G/DL (ref 6.4–8.2)
RBC # BLD: 4.03 MIL/MCL (ref 4–5.2)
SODIUM SERPL-SCNC: 141 MMOL/L (ref 135–145)
SODIUM SERPL-SCNC: 142 MMOL/L (ref 135–145)
WBC # BLD: 6.2 K/MCL (ref 4.2–11)

## 2024-11-12 PROCEDURE — 10002803 HB RX 637: Performed by: INTERNAL MEDICINE

## 2024-11-12 PROCEDURE — 96372 THER/PROPH/DIAG INJ SC/IM: CPT

## 2024-11-12 PROCEDURE — 80053 COMPREHEN METABOLIC PANEL: CPT

## 2024-11-12 PROCEDURE — 83735 ASSAY OF MAGNESIUM: CPT

## 2024-11-12 PROCEDURE — 10002800 HB RX 250 W HCPCS: Performed by: INTERNAL MEDICINE

## 2024-11-12 PROCEDURE — 85520 HEPARIN ASSAY: CPT | Performed by: INTERNAL MEDICINE

## 2024-11-12 PROCEDURE — 85025 COMPLETE CBC W/AUTO DIFF WBC: CPT

## 2024-11-12 PROCEDURE — 10004651 HB RX, NO CHARGE ITEM

## 2024-11-12 PROCEDURE — 84100 ASSAY OF PHOSPHORUS: CPT

## 2024-11-12 PROCEDURE — 10002807 HB RX 258

## 2024-11-12 PROCEDURE — 80048 BASIC METABOLIC PNL TOTAL CA: CPT

## 2024-11-12 PROCEDURE — 10006031 HB ROOM CHARGE TELEMETRY

## 2024-11-12 PROCEDURE — 10002800 HB RX 250 W HCPCS

## 2024-11-12 PROCEDURE — 99233 SBSQ HOSP IP/OBS HIGH 50: CPT | Performed by: INTERNAL MEDICINE

## 2024-11-12 PROCEDURE — 10002803 HB RX 637: Performed by: PSYCHIATRY & NEUROLOGY

## 2024-11-12 PROCEDURE — 10004651 HB RX, NO CHARGE ITEM: Performed by: INTERNAL MEDICINE

## 2024-11-12 PROCEDURE — 36415 COLL VENOUS BLD VENIPUNCTURE: CPT | Performed by: INTERNAL MEDICINE

## 2024-11-12 RX ORDER — METOPROLOL TARTRATE 25 MG/1
25 TABLET, FILM COATED ORAL 2 TIMES DAILY
Status: DISCONTINUED | OUTPATIENT
Start: 2024-11-12 | End: 2024-11-15 | Stop reason: HOSPADM

## 2024-11-12 RX ORDER — MAGNESIUM SULFATE 1 G/100ML
1 INJECTION INTRAVENOUS ONCE
Status: DISCONTINUED | OUTPATIENT
Start: 2024-11-12 | End: 2024-11-12

## 2024-11-12 RX ORDER — LIOTHYRONINE SODIUM 5 UG/1
10 TABLET ORAL DAILY
Status: DISCONTINUED | OUTPATIENT
Start: 2024-11-12 | End: 2024-11-15 | Stop reason: HOSPADM

## 2024-11-12 RX ORDER — POTASSIUM CHLORIDE 1500 MG/1
40 TABLET, EXTENDED RELEASE ORAL ONCE
Status: COMPLETED | OUTPATIENT
Start: 2024-11-12 | End: 2024-11-12

## 2024-11-12 RX ADMIN — CLOMIPRAMINE HYDROCHLORIDE 100 MG: 25 CAPSULE ORAL at 20:33

## 2024-11-12 RX ADMIN — THIAMINE HYDROCHLORIDE 100 MG: 100 INJECTION, SOLUTION INTRAMUSCULAR; INTRAVENOUS at 09:46

## 2024-11-12 RX ADMIN — POTASSIUM CHLORIDE 40 MEQ: 1500 TABLET, EXTENDED RELEASE ORAL at 02:41

## 2024-11-12 RX ADMIN — OXCARBAZEPINE 600 MG: 300 TABLET, FILM COATED ORAL at 06:15

## 2024-11-12 RX ADMIN — SODIUM CHLORIDE, SODIUM LACTATE, POTASSIUM CHLORIDE, CALCIUM CHLORIDE AND DEXTROSE MONOHYDRATE: 5; 600; 310; 30; 20 INJECTION, SOLUTION INTRAVENOUS at 15:37

## 2024-11-12 RX ADMIN — METOPROLOL TARTRATE 25 MG: 25 TABLET, FILM COATED ORAL at 20:35

## 2024-11-12 RX ADMIN — LURASIDONE HYDROCHLORIDE 60 MG: 40 TABLET, FILM COATED ORAL at 09:45

## 2024-11-12 RX ADMIN — SODIUM CHLORIDE, SODIUM LACTATE, POTASSIUM CHLORIDE, CALCIUM CHLORIDE AND DEXTROSE MONOHYDRATE: 5; 600; 310; 30; 20 INJECTION, SOLUTION INTRAVENOUS at 02:04

## 2024-11-12 RX ADMIN — OXCARBAZEPINE 900 MG: 300 TABLET, FILM COATED ORAL at 20:33

## 2024-11-12 RX ADMIN — SODIUM CHLORIDE, PRESERVATIVE FREE 2 ML: 5 INJECTION INTRAVENOUS at 20:38

## 2024-11-12 RX ADMIN — SODIUM CHLORIDE, PRESERVATIVE FREE 2 ML: 5 INJECTION INTRAVENOUS at 09:47

## 2024-11-12 RX ADMIN — ENOXAPARIN SODIUM 60 MG: 100 INJECTION SUBCUTANEOUS at 09:50

## 2024-11-12 RX ADMIN — ENOXAPARIN SODIUM 60 MG: 100 INJECTION SUBCUTANEOUS at 20:35

## 2024-11-12 RX ADMIN — MAGNESIUM SULFATE IN WATER 2 G: 40 INJECTION, SOLUTION INTRAVENOUS at 02:12

## 2024-11-12 RX ADMIN — LIOTHYRONINE SODIUM 10 MCG: 5 TABLET ORAL at 09:45

## 2024-11-12 ASSESSMENT — PAIN SCALES - GENERAL
PAINLEVEL_OUTOF10: 0

## 2024-11-13 LAB
ALBUMIN SERPL-MCNC: 3.6 G/DL (ref 3.4–5)
ALBUMIN/GLOB SERPL: 1.3 {RATIO} (ref 1–2.4)
ALP SERPL-CCNC: 61 UNITS/L (ref 45–117)
ALT SERPL-CCNC: 11 UNITS/L
ANION GAP SERPL CALC-SCNC: 11 MMOL/L (ref 7–19)
AST SERPL-CCNC: 11 UNITS/L
BASOPHILS # BLD: 0 K/MCL (ref 0–0.3)
BASOPHILS NFR BLD: 1 %
BILIRUB SERPL-MCNC: 0.3 MG/DL (ref 0.2–1)
BUN SERPL-MCNC: <5 MG/DL (ref 6–20)
BUN/CREAT SERPL: ABNORMAL
CALCIUM SERPL-MCNC: 9.1 MG/DL (ref 8.4–10.2)
CHLORIDE SERPL-SCNC: 107 MMOL/L (ref 97–110)
CO2 SERPL-SCNC: 28 MMOL/L (ref 21–32)
CREAT SERPL-MCNC: 0.49 MG/DL (ref 0.51–0.95)
DEPRECATED RDW RBC: 43.7 FL (ref 39–50)
EGFRCR SERPLBLD CKD-EPI 2021: >90 ML/MIN/{1.73_M2}
EOSINOPHIL # BLD: 0.2 K/MCL (ref 0–0.5)
EOSINOPHIL NFR BLD: 4 %
ERYTHROCYTE [DISTWIDTH] IN BLOOD: 13.7 % (ref 11–15)
FASTING DURATION TIME PATIENT: ABNORMAL H
GLOBULIN SER-MCNC: 2.8 G/DL (ref 2–4)
GLUCOSE SERPL-MCNC: 102 MG/DL (ref 70–99)
HCT VFR BLD CALC: 36.5 % (ref 36–51)
HGB BLD-MCNC: 12 G/DL (ref 12–17)
IMM GRANULOCYTES # BLD AUTO: 0 K/MCL (ref 0–0.2)
IMM GRANULOCYTES # BLD: 0 %
LYMPHOCYTES # BLD: 2.6 K/MCL (ref 1–4.8)
LYMPHOCYTES NFR BLD: 43 %
MAGNESIUM SERPL-MCNC: 1.6 MG/DL (ref 1.7–2.4)
MCH RBC QN AUTO: 28.9 PG (ref 26–34)
MCHC RBC AUTO-ENTMCNC: 32.9 G/DL (ref 32–36.5)
MCV RBC AUTO: 88 FL (ref 78–100)
MONOCYTES # BLD: 0.5 K/MCL (ref 0.3–0.9)
MONOCYTES NFR BLD: 8 %
NEUTROPHILS # BLD: 2.7 K/MCL (ref 1.8–7.7)
NEUTROPHILS NFR BLD: 44 %
NRBC BLD MANUAL-RTO: 0 /100 WBC
PHOSPHATE SERPL-MCNC: 3.8 MG/DL (ref 2.4–4.7)
PLATELET # BLD AUTO: 213 K/MCL (ref 140–450)
POTASSIUM SERPL-SCNC: 3.7 MMOL/L (ref 3.4–5.1)
PROT SERPL-MCNC: 6.4 G/DL (ref 6.4–8.2)
RBC # BLD: 4.15 MIL/MCL (ref 4–5.2)
SODIUM SERPL-SCNC: 142 MMOL/L (ref 135–145)
WBC # BLD: 6 K/MCL (ref 4.2–11)

## 2024-11-13 PROCEDURE — 96372 THER/PROPH/DIAG INJ SC/IM: CPT

## 2024-11-13 PROCEDURE — 10002800 HB RX 250 W HCPCS: Performed by: INTERNAL MEDICINE

## 2024-11-13 PROCEDURE — 36415 COLL VENOUS BLD VENIPUNCTURE: CPT

## 2024-11-13 PROCEDURE — 10002803 HB RX 637: Performed by: PSYCHIATRY & NEUROLOGY

## 2024-11-13 PROCEDURE — 10004651 HB RX, NO CHARGE ITEM: Performed by: INTERNAL MEDICINE

## 2024-11-13 PROCEDURE — 85025 COMPLETE CBC W/AUTO DIFF WBC: CPT

## 2024-11-13 PROCEDURE — 10002803 HB RX 637

## 2024-11-13 PROCEDURE — 10002807 HB RX 258

## 2024-11-13 PROCEDURE — 10000002 HB ROOM CHARGE MED SURG

## 2024-11-13 PROCEDURE — 10002803 HB RX 637: Performed by: INTERNAL MEDICINE

## 2024-11-13 PROCEDURE — 80053 COMPREHEN METABOLIC PANEL: CPT

## 2024-11-13 PROCEDURE — 84100 ASSAY OF PHOSPHORUS: CPT

## 2024-11-13 PROCEDURE — 10002800 HB RX 250 W HCPCS

## 2024-11-13 PROCEDURE — 10004651 HB RX, NO CHARGE ITEM

## 2024-11-13 PROCEDURE — 99233 SBSQ HOSP IP/OBS HIGH 50: CPT | Performed by: INTERNAL MEDICINE

## 2024-11-13 PROCEDURE — 83735 ASSAY OF MAGNESIUM: CPT

## 2024-11-13 RX ORDER — NYSTATIN 100000 [USP'U]/G
POWDER TOPICAL 3 TIMES DAILY
Status: DISCONTINUED | OUTPATIENT
Start: 2024-11-13 | End: 2024-11-15 | Stop reason: HOSPADM

## 2024-11-13 RX ORDER — 0.9 % SODIUM CHLORIDE 0.9 %
10 VIAL (ML) INJECTION PRN
Status: DISCONTINUED | OUTPATIENT
Start: 2024-11-13 | End: 2024-11-15 | Stop reason: HOSPADM

## 2024-11-13 RX ORDER — DOCUSATE SODIUM 100 MG/1
100 CAPSULE, LIQUID FILLED ORAL 3 TIMES DAILY PRN
Status: DISCONTINUED | OUTPATIENT
Start: 2024-11-13 | End: 2024-11-15 | Stop reason: HOSPADM

## 2024-11-13 RX ADMIN — MAGNESIUM SULFATE IN WATER 2 G: 40 INJECTION, SOLUTION INTRAVENOUS at 17:11

## 2024-11-13 RX ADMIN — LIOTHYRONINE SODIUM 10 MCG: 5 TABLET ORAL at 10:40

## 2024-11-13 RX ADMIN — METOPROLOL TARTRATE 25 MG: 25 TABLET, FILM COATED ORAL at 10:40

## 2024-11-13 RX ADMIN — SODIUM CHLORIDE, SODIUM LACTATE, POTASSIUM CHLORIDE, CALCIUM CHLORIDE AND DEXTROSE MONOHYDRATE: 5; 600; 310; 30; 20 INJECTION, SOLUTION INTRAVENOUS at 06:17

## 2024-11-13 RX ADMIN — OXCARBAZEPINE 900 MG: 300 TABLET, FILM COATED ORAL at 20:08

## 2024-11-13 RX ADMIN — THIAMINE HYDROCHLORIDE 100 MG: 100 INJECTION, SOLUTION INTRAMUSCULAR; INTRAVENOUS at 10:41

## 2024-11-13 RX ADMIN — OXCARBAZEPINE 600 MG: 300 TABLET, FILM COATED ORAL at 06:17

## 2024-11-13 RX ADMIN — ENOXAPARIN SODIUM 60 MG: 100 INJECTION SUBCUTANEOUS at 10:39

## 2024-11-13 RX ADMIN — SODIUM CHLORIDE, PRESERVATIVE FREE 2 ML: 5 INJECTION INTRAVENOUS at 10:42

## 2024-11-13 RX ADMIN — METOPROLOL TARTRATE 25 MG: 25 TABLET, FILM COATED ORAL at 20:08

## 2024-11-13 RX ADMIN — SODIUM CHLORIDE, PRESERVATIVE FREE 2 ML: 5 INJECTION INTRAVENOUS at 20:07

## 2024-11-13 RX ADMIN — Medication 6 MG: at 20:15

## 2024-11-13 RX ADMIN — ENOXAPARIN SODIUM 60 MG: 100 INJECTION SUBCUTANEOUS at 20:08

## 2024-11-13 RX ADMIN — CLOMIPRAMINE HYDROCHLORIDE 100 MG: 25 CAPSULE ORAL at 20:07

## 2024-11-13 RX ADMIN — LURASIDONE HYDROCHLORIDE 60 MG: 40 TABLET, FILM COATED ORAL at 10:40

## 2024-11-13 ASSESSMENT — PAIN SCALES - GENERAL
PAINLEVEL_OUTOF10: 0
PAINLEVEL_OUTOF10: 0

## 2024-11-14 LAB
ANION GAP SERPL CALC-SCNC: 10 MMOL/L (ref 7–19)
BUN SERPL-MCNC: 8 MG/DL (ref 6–20)
BUN/CREAT SERPL: 16 (ref 7–25)
CALCIUM SERPL-MCNC: 9 MG/DL (ref 8.4–10.2)
CHLORIDE SERPL-SCNC: 105 MMOL/L (ref 97–110)
CO2 SERPL-SCNC: 28 MMOL/L (ref 21–32)
CREAT SERPL-MCNC: 0.5 MG/DL (ref 0.51–0.95)
EGFRCR SERPLBLD CKD-EPI 2021: >90 ML/MIN/{1.73_M2}
FASTING DURATION TIME PATIENT: ABNORMAL H
GLUCOSE SERPL-MCNC: 114 MG/DL (ref 70–99)
MAGNESIUM SERPL-MCNC: 1.7 MG/DL (ref 1.7–2.4)
PHOSPHATE SERPL-MCNC: 5.9 MG/DL (ref 2.4–4.7)
POTASSIUM SERPL-SCNC: 3.4 MMOL/L (ref 3.4–5.1)
SODIUM SERPL-SCNC: 140 MMOL/L (ref 135–145)

## 2024-11-14 PROCEDURE — 10004651 HB RX, NO CHARGE ITEM: Performed by: INTERNAL MEDICINE

## 2024-11-14 PROCEDURE — 10004651 HB RX, NO CHARGE ITEM

## 2024-11-14 PROCEDURE — 96372 THER/PROPH/DIAG INJ SC/IM: CPT

## 2024-11-14 PROCEDURE — 36415 COLL VENOUS BLD VENIPUNCTURE: CPT | Performed by: INTERNAL MEDICINE

## 2024-11-14 PROCEDURE — 83735 ASSAY OF MAGNESIUM: CPT

## 2024-11-14 PROCEDURE — 10002800 HB RX 250 W HCPCS

## 2024-11-14 PROCEDURE — 97161 PT EVAL LOW COMPLEX 20 MIN: CPT

## 2024-11-14 PROCEDURE — 10002803 HB RX 637: Performed by: PSYCHIATRY & NEUROLOGY

## 2024-11-14 PROCEDURE — 10002803 HB RX 637

## 2024-11-14 PROCEDURE — 10000002 HB ROOM CHARGE MED SURG

## 2024-11-14 PROCEDURE — 99233 SBSQ HOSP IP/OBS HIGH 50: CPT | Performed by: INTERNAL MEDICINE

## 2024-11-14 PROCEDURE — 10002803 HB RX 637: Performed by: INTERNAL MEDICINE

## 2024-11-14 PROCEDURE — 97530 THERAPEUTIC ACTIVITIES: CPT

## 2024-11-14 PROCEDURE — 84100 ASSAY OF PHOSPHORUS: CPT

## 2024-11-14 PROCEDURE — 97116 GAIT TRAINING THERAPY: CPT

## 2024-11-14 PROCEDURE — 80048 BASIC METABOLIC PNL TOTAL CA: CPT | Performed by: INTERNAL MEDICINE

## 2024-11-14 RX ORDER — LANOLIN ALCOHOL/MO/W.PET/CERES
400 CREAM (GRAM) TOPICAL ONCE
Status: COMPLETED | OUTPATIENT
Start: 2024-11-15 | End: 2024-11-15

## 2024-11-14 RX ORDER — POTASSIUM CHLORIDE 1500 MG/1
40 TABLET, EXTENDED RELEASE ORAL ONCE
Status: COMPLETED | OUTPATIENT
Start: 2024-11-14 | End: 2024-11-14

## 2024-11-14 RX ORDER — LANOLIN ALCOHOL/MO/W.PET/CERES
400 CREAM (GRAM) TOPICAL ONCE
Status: COMPLETED | OUTPATIENT
Start: 2024-11-14 | End: 2024-11-14

## 2024-11-14 RX ADMIN — METOPROLOL TARTRATE 25 MG: 25 TABLET, FILM COATED ORAL at 20:58

## 2024-11-14 RX ADMIN — OXCARBAZEPINE 900 MG: 300 TABLET, FILM COATED ORAL at 20:58

## 2024-11-14 RX ADMIN — LIOTHYRONINE SODIUM 10 MCG: 5 TABLET ORAL at 10:23

## 2024-11-14 RX ADMIN — METOPROLOL TARTRATE 25 MG: 25 TABLET, FILM COATED ORAL at 10:23

## 2024-11-14 RX ADMIN — ENOXAPARIN SODIUM 60 MG: 100 INJECTION SUBCUTANEOUS at 10:42

## 2024-11-14 RX ADMIN — LURASIDONE HYDROCHLORIDE 60 MG: 40 TABLET, FILM COATED ORAL at 10:23

## 2024-11-14 RX ADMIN — CLOMIPRAMINE HYDROCHLORIDE 100 MG: 25 CAPSULE ORAL at 20:58

## 2024-11-14 RX ADMIN — Medication 400 MG: at 19:08

## 2024-11-14 RX ADMIN — NYSTATIN: 100000 POWDER TOPICAL at 10:24

## 2024-11-14 RX ADMIN — ENOXAPARIN SODIUM 60 MG: 100 INJECTION SUBCUTANEOUS at 20:59

## 2024-11-14 RX ADMIN — Medication 6 MG: at 23:24

## 2024-11-14 RX ADMIN — NYSTATIN: 100000 POWDER TOPICAL at 21:05

## 2024-11-14 RX ADMIN — POTASSIUM CHLORIDE 40 MEQ: 1500 TABLET, EXTENDED RELEASE ORAL at 11:24

## 2024-11-14 RX ADMIN — OXCARBAZEPINE 600 MG: 300 TABLET, FILM COATED ORAL at 06:42

## 2024-11-14 RX ADMIN — SODIUM CHLORIDE, PRESERVATIVE FREE 2 ML: 5 INJECTION INTRAVENOUS at 21:03

## 2024-11-14 RX ADMIN — SODIUM CHLORIDE, PRESERVATIVE FREE 2 ML: 5 INJECTION INTRAVENOUS at 09:25

## 2024-11-14 RX ADMIN — SODIUM CHLORIDE, PRESERVATIVE FREE 2 ML: 5 INJECTION INTRAVENOUS at 20:58

## 2024-11-14 ASSESSMENT — PATIENT HEALTH QUESTIONNAIRE - PHQ9: IS PATIENT ABLE TO COMPLETE PHQ2 OR PHQ9: YES

## 2024-11-14 ASSESSMENT — COGNITIVE AND FUNCTIONAL STATUS - GENERAL
BASIC_MOBILITY_RAW_SCORE: 18
BASIC_MOBILITY_CONVERTED_SCORE: 41.05

## 2024-11-14 ASSESSMENT — PAIN SCALES - GENERAL
PAINLEVEL_OUTOF10: 0

## 2024-11-15 VITALS
WEIGHT: 293 LBS | OXYGEN SATURATION: 98 % | DIASTOLIC BLOOD PRESSURE: 60 MMHG | TEMPERATURE: 97 F | HEIGHT: 65 IN | SYSTOLIC BLOOD PRESSURE: 110 MMHG | BODY MASS INDEX: 48.82 KG/M2 | HEART RATE: 95 BPM | RESPIRATION RATE: 16 BRPM

## 2024-11-15 PROBLEM — E83.39 HYPOPHOSPHATEMIA: Status: ACTIVE | Noted: 2024-11-15

## 2024-11-15 PROBLEM — B37.2 CANDIDA INFECTION OF FLEXURAL SKIN: Status: ACTIVE | Noted: 2024-11-15

## 2024-11-15 LAB
ANION GAP SERPL CALC-SCNC: 12 MMOL/L (ref 7–19)
BUN SERPL-MCNC: 12 MG/DL (ref 6–20)
BUN/CREAT SERPL: 23 (ref 7–25)
CALCIUM SERPL-MCNC: 9.4 MG/DL (ref 8.4–10.2)
CHLORIDE SERPL-SCNC: 103 MMOL/L (ref 97–110)
CO2 SERPL-SCNC: 31 MMOL/L (ref 21–32)
CREAT SERPL-MCNC: 0.53 MG/DL (ref 0.51–0.95)
EGFRCR SERPLBLD CKD-EPI 2021: >90 ML/MIN/{1.73_M2}
FASTING DURATION TIME PATIENT: ABNORMAL H
GLUCOSE SERPL-MCNC: 111 MG/DL (ref 70–99)
MAGNESIUM SERPL-MCNC: 1.7 MG/DL (ref 1.7–2.4)
PHOSPHATE SERPL-MCNC: 5.8 MG/DL (ref 2.4–4.7)
POTASSIUM SERPL-SCNC: 3.8 MMOL/L (ref 3.4–5.1)
SODIUM SERPL-SCNC: 142 MMOL/L (ref 135–145)

## 2024-11-15 PROCEDURE — 10004651 HB RX, NO CHARGE ITEM: Performed by: INTERNAL MEDICINE

## 2024-11-15 PROCEDURE — 10002803 HB RX 637: Performed by: INTERNAL MEDICINE

## 2024-11-15 PROCEDURE — 80048 BASIC METABOLIC PNL TOTAL CA: CPT | Performed by: INTERNAL MEDICINE

## 2024-11-15 PROCEDURE — 99239 HOSP IP/OBS DSCHRG MGMT >30: CPT | Performed by: INTERNAL MEDICINE

## 2024-11-15 PROCEDURE — 84100 ASSAY OF PHOSPHORUS: CPT

## 2024-11-15 PROCEDURE — 10002800 HB RX 250 W HCPCS

## 2024-11-15 PROCEDURE — 99232 SBSQ HOSP IP/OBS MODERATE 35: CPT | Performed by: PSYCHIATRY & NEUROLOGY

## 2024-11-15 PROCEDURE — 10004651 HB RX, NO CHARGE ITEM

## 2024-11-15 PROCEDURE — 83735 ASSAY OF MAGNESIUM: CPT

## 2024-11-15 PROCEDURE — 10002803 HB RX 637: Performed by: PSYCHIATRY & NEUROLOGY

## 2024-11-15 PROCEDURE — 96372 THER/PROPH/DIAG INJ SC/IM: CPT

## 2024-11-15 PROCEDURE — 36415 COLL VENOUS BLD VENIPUNCTURE: CPT | Performed by: INTERNAL MEDICINE

## 2024-11-15 RX ORDER — ACETAMINOPHEN 325 MG/1
650 TABLET ORAL EVERY 6 HOURS PRN
Status: SHIPPED | COMMUNITY
Start: 2024-11-15

## 2024-11-15 RX ORDER — LANOLIN ALCOHOL/MO/W.PET/CERES
400 CREAM (GRAM) TOPICAL ONCE
Status: COMPLETED | OUTPATIENT
Start: 2024-11-15 | End: 2024-11-15

## 2024-11-15 RX ORDER — HYDROXYZINE HYDROCHLORIDE 25 MG/1
25 TABLET, FILM COATED ORAL 4 TIMES DAILY PRN
Status: SHIPPED | COMMUNITY
Start: 2024-11-15

## 2024-11-15 RX ORDER — OXCARBAZEPINE 600 MG/1
600 TABLET, FILM COATED ORAL EVERY MORNING
Status: SHIPPED | COMMUNITY
Start: 2024-11-16

## 2024-11-15 RX ORDER — ELECTROLYTES/DEXTROSE
1 SOLUTION, ORAL ORAL DAILY
Status: SHIPPED | COMMUNITY
Start: 2024-11-15

## 2024-11-15 RX ORDER — OXCARBAZEPINE 300 MG/1
900 TABLET, FILM COATED ORAL AT BEDTIME
Status: SHIPPED | COMMUNITY
Start: 2024-11-15

## 2024-11-15 RX ORDER — LANOLIN ALCOHOL/MO/W.PET/CERES
400 CREAM (GRAM) TOPICAL ONCE
Status: DISCONTINUED | OUTPATIENT
Start: 2024-11-15 | End: 2024-11-15 | Stop reason: HOSPADM

## 2024-11-15 RX ADMIN — NYSTATIN: 100000 POWDER TOPICAL at 08:27

## 2024-11-15 RX ADMIN — SODIUM CHLORIDE, PRESERVATIVE FREE 2 ML: 5 INJECTION INTRAVENOUS at 08:27

## 2024-11-15 RX ADMIN — Medication 100 MG: at 08:23

## 2024-11-15 RX ADMIN — Medication 400 MG: at 10:18

## 2024-11-15 RX ADMIN — LURASIDONE HYDROCHLORIDE 60 MG: 40 TABLET, FILM COATED ORAL at 08:23

## 2024-11-15 RX ADMIN — ENOXAPARIN SODIUM 60 MG: 100 INJECTION SUBCUTANEOUS at 08:25

## 2024-11-15 RX ADMIN — METOPROLOL TARTRATE 25 MG: 25 TABLET, FILM COATED ORAL at 08:23

## 2024-11-15 RX ADMIN — Medication 400 MG: at 03:00

## 2024-11-15 RX ADMIN — LIOTHYRONINE SODIUM 10 MCG: 5 TABLET ORAL at 08:23

## 2024-11-15 RX ADMIN — NYSTATIN: 100000 POWDER TOPICAL at 16:50

## 2024-11-15 RX ADMIN — OXCARBAZEPINE 600 MG: 300 TABLET, FILM COATED ORAL at 06:38

## 2024-11-15 ASSESSMENT — PAIN SCALES - GENERAL
PAINLEVEL_OUTOF10: 0

## 2024-11-17 ENCOUNTER — TELEPHONE (OUTPATIENT)
Dept: CARE COORDINATION | Age: 24
End: 2024-11-17

## 2024-11-18 ENCOUNTER — TELEPHONE (OUTPATIENT)
Dept: CARE COORDINATION | Age: 24
End: 2024-11-18

## 2024-11-22 ENCOUNTER — OFFICE VISIT (OUTPATIENT)
Dept: FAMILY MEDICINE CLINIC | Facility: CLINIC | Age: 24
End: 2024-11-22
Payer: COMMERCIAL

## 2024-11-22 VITALS
HEART RATE: 91 BPM | RESPIRATION RATE: 16 BRPM | TEMPERATURE: 98 F | SYSTOLIC BLOOD PRESSURE: 100 MMHG | BODY MASS INDEX: 57 KG/M2 | DIASTOLIC BLOOD PRESSURE: 60 MMHG | OXYGEN SATURATION: 99 % | WEIGHT: 293 LBS

## 2024-11-22 DIAGNOSIS — F60.3 BORDERLINE PERSONALITY DISORDER (HCC): ICD-10-CM

## 2024-11-22 DIAGNOSIS — F43.10 PTSD (POST-TRAUMATIC STRESS DISORDER): ICD-10-CM

## 2024-11-22 DIAGNOSIS — E83.42 HYPOMAGNESEMIA: ICD-10-CM

## 2024-11-22 DIAGNOSIS — Z11.1 SCREENING-PULMONARY TB: ICD-10-CM

## 2024-11-22 DIAGNOSIS — E87.29 STARVATION KETOACIDOSIS: ICD-10-CM

## 2024-11-22 DIAGNOSIS — I42.9 FAMILIAL CARDIOMYOPATHY (HCC): ICD-10-CM

## 2024-11-22 DIAGNOSIS — Z78.9 FEMALE-TO-MALE TRANSGENDER PERSON: ICD-10-CM

## 2024-11-22 DIAGNOSIS — E83.39 HYPOPHOSPHATEMIA: ICD-10-CM

## 2024-11-22 DIAGNOSIS — F90.2 ATTENTION DEFICIT HYPERACTIVITY DISORDER (ADHD), COMBINED TYPE: ICD-10-CM

## 2024-11-22 DIAGNOSIS — E66.01 MORBID OBESITY (HCC): ICD-10-CM

## 2024-11-22 DIAGNOSIS — T73.0XXA STARVATION KETOACIDOSIS: ICD-10-CM

## 2024-11-22 DIAGNOSIS — Z09 HOSPITAL DISCHARGE FOLLOW-UP: Primary | ICD-10-CM

## 2024-11-22 DIAGNOSIS — F50.00 ANOREXIA NERVOSA (HCC): ICD-10-CM

## 2024-11-22 DIAGNOSIS — F50.82 AVOIDANT-RESTRICTIVE FOOD INTAKE DISORDER (ARFID): ICD-10-CM

## 2024-11-22 DIAGNOSIS — R73.03 PREDIABETES: ICD-10-CM

## 2024-11-22 DIAGNOSIS — Z02.89 ENCOUNTER FOR COMPLETION OF FORM WITH PATIENT: ICD-10-CM

## 2024-11-22 LAB
ATRIAL RATE: 78 BPM
P AXIS: 53 DEGREES
P-R INTERVAL: 122 MS
Q-T INTERVAL: 398 MS
QRS DURATION: 86 MS
QTC CALCULATION (BEZET): 453 MS
R AXIS: 23 DEGREES
T AXIS: 12 DEGREES
VENTRICULAR RATE: 78 BPM

## 2024-11-22 NOTE — PROGRESS NOTES
Subjective:   Roxane Tyler is a 24 year old adult who presents for hospital follow up.   He was discharged from Inpatient hospital,  [facility]  to Home   Admit Date: 11/10/24  Discharge Date: 11/15/24  Hospital Discharge Diagnosis:   Eating disorder  Starvation ketoacidosis  Hypophosphatemia  Hypomagnesemia  Prediabetes  Morbid obesity  BPD  PTSD  ADHD      During the visit, the following was completed:  Obtained and reviewed discharge summary, continuity of care documents, and Hospitalist notes  Reviewed Labs (CBC, CMP)    HPI:   Was at inpatient for eating disorder at psychiatric facility. Did not eat and minimal drinking for 6 days.     Has been eating since being home. He is forcing himself to eat. Trying to keep up with fluids. Normal urination. Occasionally feels light headed so will sit down and rest. While hospitalized they stopped the Metformin. Unsure if he should resume    Hospitalist was concerned he may have POTS. Does see [facility]ologist at [facility]. Will schedule follow up.     Working getting into residential program for eating disorder. Has not followed up with outpatient psychiatrist yet.     History/Other:   Current Medications:  Medication Reconciliation:  I am aware of an inpatient discharge within the last 30 days.  The discharge medication list has been reconciled with the patient's current medication list and reviewed by me. See medication list for additions of new medication, and changes to current doses of medications and discontinued medications.  Outpatient Medications Marked as Taking for the 11/22/24 encounter (Office Visit) with Lolita Johnson APRN   Medication Sig    Meloxicam 15 MG Oral Tab Take 1 tablet (15 mg total) by mouth daily.    acetaminophen 500 MG Oral Tab Take 2 tablets (1,000 mg total) by mouth in the morning, at noon, and at bedtime. Am and 4 pm and bedtime    methylphenidate ER 36 MG Oral Tab CR Take 1 tablet (36 mg total) by mouth daily.    clomiPRAMINE 50 MG  Oral Cap Take 2 capsules (100 mg total) by mouth at bedtime.    hydrOXYzine 25 MG Oral Tab Take 1 tablet (25 mg total) by mouth 3 (three) times daily as needed for Anxiety.    naltrexone 50 MG Oral Tab Take 1 tablet (50 mg total) by mouth at bedtime.    OXcarbazepine 300 MG Oral Tab Take 1 tablet (300 mg total) by mouth at bedtime. Take with 600 mg for total 900 mg at HS    OXcarbazepine 600 MG Oral Tab Take 1 tablet (600 mg total) by mouth 2 (two) times daily.    liothyronine 5 MCG Oral Tab Take 2 tablets (10 mcg total) by mouth daily.    gabapentin 100 MG Oral Cap Take 1 capsule (100 mg total) by mouth daily as needed.    lurasidone 60 MG Oral Tab Take 1 tablet (60 mg total) by mouth at bedtime.    trihexyphenidyl 2 MG Oral Tab Take 2 tablets (4 mg total) by mouth 3 (three) times daily with meals.    Ferrous Sulfate (IRON) 325 (65 Fe) MG Oral Tab Take 1 tablet by mouth 2 (two) times daily.    Melatonin 5 MG Oral Tab Take 1 tablet (5 mg total) by mouth at bedtime.    metoprolol tartrate 25 MG Oral Tab Take 1 tablet (25 mg total) by mouth 2 (two) times daily.    Pravastatin Sodium 80 MG Oral Tab Take 1 tablet (80 mg total) by mouth nightly.    Probiotic Product (DIGESTIVE ADVANTAGE GUMMIES) Oral Chew Tab Chew 2 tablets by mouth every evening. CHEW & SWALLOW    multivitamin Oral Tab Take 1 tablet by mouth daily.    Cholecalciferol 75 MCG (3000 UT) Oral Tab Take 6,000 Units by mouth daily.       Review of Systems   Constitutional:  Positive for appetite change. Negative for chills and unexpected weight change.   Respiratory:  Negative for cough.    Cardiovascular:  Negative for chest pain.   Gastrointestinal:  Negative for abdominal pain, constipation (did have some constipation last week but feeling better this week), diarrhea, nausea and vomiting.   Genitourinary:  Negative for menstrual problem.   Psychiatric/Behavioral:  Positive for dysphoric mood. Negative for self-injury and suicidal ideas. The patient is  nervous/anxious.      Objective:   Physical Exam  Constitutional:       General: He is not in acute distress.     Appearance: He is obese. He is not ill-appearing.   Cardiovascular:      Rate and Rhythm: Normal rate and regular rhythm.      Heart sounds: Normal heart sounds.   Pulmonary:      Effort: Pulmonary effort is normal.      Breath sounds: Normal breath sounds.   Neurological:      Mental Status: He is alert.   Psychiatric:         Attention and Perception: Attention normal.         Mood and Affect: Mood and affect normal.         Speech: Speech normal.         Behavior: Behavior normal. Behavior is cooperative.         Thought Content: Thought content normal.         Cognition and Memory: Cognition normal.       /60 (BP Location: Right arm, Patient Position: Sitting)   Pulse 91   Temp 97.7 °F (36.5 °C)   Resp 16   Wt (!) 332 lb (150.6 kg)   LMP  (LMP Unknown)   SpO2 99%   BMI 56.99 kg/m²  Estimated body mass index is 56.99 kg/m² as calculated from the following:    Height as of 7/10/24: 5' 4\" (1.626 m).    Weight as of this encounter: 332 lb (150.6 kg).    Assessment & Plan:   1. Hospital discharge follow-up (Primary)  -     CBC With Differential With Platelet; Future; Expected date: 11/22/2024  -     Comp Metabolic Panel (14); Future; Expected date: 11/22/2024  -     Calcium Ionized-Out Patient; Future; Expected date: 11/22/2024  -     Magnesium; Future; Expected date: 11/22/2024  -     Phosphorus; Future; Expected date: 11/22/2024  -     Vitamin D; Future; Expected date: 11/22/2024  -     TSH and Free T4; Future; Expected date: 11/22/2024  -     EKG with interpretation and Report -IN OFFICE [[id number]]  -     Drug screen 8 w/out confirmation, urine; Future; Expected date: 11/22/2024  -     Urinalysis, Routine; Future; Expected date: 11/22/2024  2. Avoidant-restrictive food intake disorder (ARFID)  -     EKG with interpretation and Report -IN OFFICE [[id number]]  -     Drug screen 8 w/out confirmation,  urine; Future; Expected date: 11/22/2024  -     Urinalysis, Routine; Future; Expected date: 11/22/2024  3. Starvation ketoacidosis  -     Comp Metabolic Panel (14); Future; Expected date: 11/22/2024  -     Calcium Ionized-Out Patient; Future; Expected date: 11/22/2024  -     Sed Rate, Westergren (Automated); Future; Expected date: 11/22/2024  -     EKG with interpretation and Report -IN OFFICE [[id number]]  -     Drug screen 8 w/out confirmation, urine; Future; Expected date: 11/22/2024  -     Urinalysis, Routine; Future; Expected date: 11/22/2024  4. Hypophosphatemia  -     Comp Metabolic Panel (14); Future; Expected date: 11/22/2024  -     Phosphorus; Future; Expected date: 11/22/2024  -     EKG with interpretation and Report -IN OFFICE [[id number]]  5. Hypomagnesemia  -     Comp Metabolic Panel (14); Future; Expected date: 11/22/2024  -     Magnesium; Future; Expected date: 11/22/2024  -     EKG with interpretation and Report -IN OFFICE [[id number]]  6. Prediabetes  -     Hemoglobin A1C; Future; Expected date: 11/22/2024  -     EKG with interpretation and Report -IN OFFICE [[id number]]  7. Morbid obesity (HCC)  -     EKG with interpretation and Report -IN OFFICE [[id number]]  8. Borderline personality disorder (HCC)  9. PTSD (post-traumatic stress disorder)  10. Attention deficit hyperactivity disorder (ADHD), combined type  11. Female-to-male transgender person  12. Screening-pulmonary TB  -     Quantiferon TB Plus; Future; Expected date: 11/22/2024  13. Encounter for completion of form with patient  14. Anorexia nervosa (HCC)  15. Familial cardiomyopathy (HCC)    Reviewed hospitalization  Needs form completed so he can enroll in new eating disorder program  Lab work, EKG needed      Return in 4 weeks (on 12/20/2024).

## 2024-11-23 ENCOUNTER — LAB ENCOUNTER (OUTPATIENT)
Dept: LAB | Age: 24
End: 2024-11-23
Attending: NURSE PRACTITIONER
Payer: COMMERCIAL

## 2024-11-23 DIAGNOSIS — F50.82 AVOIDANT-RESTRICTIVE FOOD INTAKE DISORDER (ARFID): ICD-10-CM

## 2024-11-23 DIAGNOSIS — Z11.1 SCREENING-PULMONARY TB: ICD-10-CM

## 2024-11-23 DIAGNOSIS — T73.0XXA STARVATION KETOACIDOSIS: ICD-10-CM

## 2024-11-23 DIAGNOSIS — Z09 HOSPITAL DISCHARGE FOLLOW-UP: ICD-10-CM

## 2024-11-23 DIAGNOSIS — E83.42 HYPOMAGNESEMIA: ICD-10-CM

## 2024-11-23 DIAGNOSIS — R73.03 PREDIABETES: ICD-10-CM

## 2024-11-23 DIAGNOSIS — E87.29 STARVATION KETOACIDOSIS: ICD-10-CM

## 2024-11-23 DIAGNOSIS — E83.39 HYPOPHOSPHATEMIA: ICD-10-CM

## 2024-11-23 LAB
ALBUMIN SERPL-MCNC: 4.3 G/DL (ref 3.2–4.8)
ALBUMIN/GLOB SERPL: 1.3 {RATIO} (ref 1–2)
ALP LIVER SERPL-CCNC: 68 U/L
ALT SERPL-CCNC: 22 U/L
AMPHET UR QL SCN: NEGATIVE
ANION GAP SERPL CALC-SCNC: 8 MMOL/L (ref 0–18)
AST SERPL-CCNC: 18 U/L (ref ?–34)
BASOPHILS # BLD AUTO: 0.06 X10(3) UL (ref 0–0.2)
BASOPHILS NFR BLD AUTO: 0.9 %
BENZODIAZ UR QL SCN: NEGATIVE
BILIRUB SERPL-MCNC: 0.3 MG/DL (ref 0.3–1.2)
BILIRUB UR QL STRIP.AUTO: NEGATIVE
BUN BLD-MCNC: 10 MG/DL (ref 9–23)
CALCIUM BLD-MCNC: 10.1 MG/DL (ref 8.7–10.4)
CANNABINOIDS UR QL SCN: NEGATIVE
CHLORIDE SERPL-SCNC: 102 MMOL/L (ref 98–112)
CLARITY UR REFRACT.AUTO: CLEAR
CO2 SERPL-SCNC: 28 MMOL/L (ref 21–32)
COCAINE UR QL: NEGATIVE
COLOR UR AUTO: YELLOW
CREAT BLD-MCNC: 0.7 MG/DL
CREAT UR-SCNC: 138.8 MG/DL
EGFRCR SERPLBLD CKD-EPI 2021: 125 ML/MIN/1.73M2 (ref 60–?)
EOSINOPHIL # BLD AUTO: 0.2 X10(3) UL (ref 0–0.7)
EOSINOPHIL NFR BLD AUTO: 2.9 %
ERYTHROCYTE [DISTWIDTH] IN BLOOD BY AUTOMATED COUNT: 13.5 %
ERYTHROCYTE [SEDIMENTATION RATE] IN BLOOD: 36 MM/HR
EST. AVERAGE GLUCOSE BLD GHB EST-MCNC: 105 MG/DL (ref 68–126)
FASTING STATUS PATIENT QL REPORTED: YES
FENTANYL UR QL SCN: NEGATIVE
GLOBULIN PLAS-MCNC: 3.3 G/DL (ref 2–3.5)
GLUCOSE BLD-MCNC: 92 MG/DL (ref 70–99)
GLUCOSE UR STRIP.AUTO-MCNC: NORMAL MG/DL
HBA1C MFR BLD: 5.3 % (ref ?–5.7)
HCT VFR BLD AUTO: 39.7 %
HGB BLD-MCNC: 12.8 G/DL
IMM GRANULOCYTES # BLD AUTO: 0.01 X10(3) UL (ref 0–1)
IMM GRANULOCYTES NFR BLD: 0.1 %
KETONES UR STRIP.AUTO-MCNC: NEGATIVE MG/DL
LEUKOCYTE ESTERASE UR QL STRIP.AUTO: 250
LYMPHOCYTES # BLD AUTO: 2.23 X10(3) UL (ref 1–4)
LYMPHOCYTES NFR BLD AUTO: 32.6 %
MAGNESIUM SERPL-MCNC: 1.8 MG/DL (ref 1.6–2.6)
MCH RBC QN AUTO: 28.6 PG (ref 26–34)
MCHC RBC AUTO-ENTMCNC: 32.2 G/DL (ref 31–37)
MCV RBC AUTO: 88.6 FL
MDMA UR QL SCN: NEGATIVE
MONOCYTES # BLD AUTO: 0.47 X10(3) UL (ref 0.1–1)
MONOCYTES NFR BLD AUTO: 6.9 %
NEUTROPHILS # BLD AUTO: 3.88 X10 (3) UL (ref 1.5–7.7)
NEUTROPHILS # BLD AUTO: 3.88 X10(3) UL (ref 1.5–7.7)
NEUTROPHILS NFR BLD AUTO: 56.6 %
NITRITE UR QL STRIP.AUTO: NEGATIVE
OPIATES UR QL SCN: NEGATIVE
OSMOLALITY SERPL CALC.SUM OF ELEC: 285 MOSM/KG (ref 275–295)
OXYCODONE UR QL SCN: NEGATIVE
PH UR STRIP.AUTO: 7 [PH] (ref 5–8)
PHOSPHATE SERPL-MCNC: 4.3 MG/DL (ref 2.4–5.1)
PLATELET # BLD AUTO: 270 10(3)UL (ref 150–450)
POTASSIUM SERPL-SCNC: 4.2 MMOL/L (ref 3.5–5.1)
PROT SERPL-MCNC: 7.6 G/DL (ref 5.7–8.2)
RBC # BLD AUTO: 4.48 X10(6)UL
RBC UR QL AUTO: NEGATIVE
SODIUM SERPL-SCNC: 138 MMOL/L (ref 136–145)
SP GR UR STRIP.AUTO: 1.02 (ref 1–1.03)
T4 FREE SERPL-MCNC: 0.8 NG/DL (ref 0.8–1.7)
TSI SER-ACNC: 0.45 UIU/ML (ref 0.55–4.78)
UROBILINOGEN UR STRIP.AUTO-MCNC: NORMAL MG/DL
VIT D+METAB SERPL-MCNC: 41.8 NG/ML (ref 30–100)
WBC # BLD AUTO: 6.9 X10(3) UL (ref 4–11)

## 2024-11-23 PROCEDURE — 82330 ASSAY OF CALCIUM: CPT

## 2024-11-23 PROCEDURE — 36415 COLL VENOUS BLD VENIPUNCTURE: CPT

## 2024-11-23 PROCEDURE — 85652 RBC SED RATE AUTOMATED: CPT

## 2024-11-23 PROCEDURE — 84439 ASSAY OF FREE THYROXINE: CPT

## 2024-11-23 PROCEDURE — 80053 COMPREHEN METABOLIC PANEL: CPT

## 2024-11-23 PROCEDURE — 83036 HEMOGLOBIN GLYCOSYLATED A1C: CPT

## 2024-11-23 PROCEDURE — 80307 DRUG TEST PRSMV CHEM ANLYZR: CPT

## 2024-11-23 PROCEDURE — 84443 ASSAY THYROID STIM HORMONE: CPT

## 2024-11-23 PROCEDURE — 81001 URINALYSIS AUTO W/SCOPE: CPT

## 2024-11-23 PROCEDURE — 85025 COMPLETE CBC W/AUTO DIFF WBC: CPT

## 2024-11-23 PROCEDURE — 84100 ASSAY OF PHOSPHORUS: CPT

## 2024-11-23 PROCEDURE — 82306 VITAMIN D 25 HYDROXY: CPT

## 2024-11-23 PROCEDURE — 86480 TB TEST CELL IMMUN MEASURE: CPT

## 2024-11-23 PROCEDURE — 83735 ASSAY OF MAGNESIUM: CPT

## 2024-11-24 ENCOUNTER — TELEPHONE (OUTPATIENT)
Dept: CARE COORDINATION | Age: 24
End: 2024-11-24

## 2024-11-25 ENCOUNTER — TELEPHONE (OUTPATIENT)
Dept: FAMILY MEDICINE CLINIC | Facility: CLINIC | Age: 24
End: 2024-11-25

## 2024-11-25 ENCOUNTER — TELEPHONE (OUTPATIENT)
Dept: CARE COORDINATION | Age: 24
End: 2024-11-25

## 2024-11-25 DIAGNOSIS — R79.89 DECREASED THYROID STIMULATING HORMONE (TSH) LEVEL: Primary | ICD-10-CM

## 2024-11-25 LAB
M TB IFN-G CD4+ T-CELLS BLD-ACNC: 0.01 IU/ML
M TB TUBERC IFN-G BLD QL: NEGATIVE
M TB TUBERC IGNF/MITOGEN IGNF CONTROL: >10 IU/ML
QFT TB1 AG MINUS NIL: 0 IU/ML
QFT TB2 AG MINUS NIL: 0 IU/ML

## 2024-11-25 RX ORDER — MELOXICAM 15 MG/1
15 TABLET ORAL DAILY
Qty: 30 TABLET | Refills: 0 | Status: SHIPPED | OUTPATIENT
Start: 2024-11-25

## 2024-11-25 NOTE — TELEPHONE ENCOUNTER
Labs printed  Faxed along with paperwork to [facility] at [phone]    Copy in scan pending  -copy also sent to scan

## 2024-11-25 NOTE — TELEPHONE ENCOUNTER
Last office visit on 11/22/2024 with JOSÉ Williamson, [facility] follow-up    Meloxicam  - patient reported last ordered 11/6/2024?

## 2024-11-25 NOTE — TELEPHONE ENCOUNTER
Meloxicam 15 MG Oral Tab     Patient was seen on Friday, states she asked for rx to be renewed, has not been sent yet    Uses [facility] [place]

## 2024-11-25 NOTE — TELEPHONE ENCOUNTER
----- Message from Lolita Johnson sent at 11/25/2024  8:19 AM CST -----  Results reviewed.   TSH mildly low. Repeat in 1 month for comparison.  UA stable, not great clean catch. Is she having any unusual urinary symptoms?   No diabetes or prediabetes. Okay to stay off Metformin  Chemistries normal  Magnesium normal  Drug screen negative  Sed rate normal  No anemia  Vitamin D normal    TB screening pending.

## 2024-11-25 NOTE — TELEPHONE ENCOUNTER
----- Message from Lolita Johnson sent at 11/25/2024 11:30 AM CST -----  Results reviewed.   TB screening negative  Please print all labs. Patient can  medical screening form

## 2024-11-26 ENCOUNTER — TELEPHONE (OUTPATIENT)
Dept: FAMILY MEDICINE CLINIC | Facility: CLINIC | Age: 24
End: 2024-11-26

## 2024-11-26 LAB — LC CALCIUM, IONIZED: 5.1 MG/DL

## 2024-11-26 NOTE — TELEPHONE ENCOUNTER
Per provider note on 11/25/2024:    Results reviewed.  TB screening negative  Please print all labs. Patient can  medical screening form

## 2024-12-01 ENCOUNTER — TELEPHONE (OUTPATIENT)
Dept: CARE COORDINATION | Age: 24
End: 2024-12-01

## 2024-12-02 ENCOUNTER — TELEPHONE (OUTPATIENT)
Dept: CARE COORDINATION | Age: 24
End: 2024-12-02

## 2024-12-02 NOTE — ED QUICK NOTES
Avoid NSAIDs 1 week before surgery (Ibuprofen, Aleve, Advil, Aspirin)  Tylenol/Acetaminophen based products are ok to continue as needed.    Stop any non-prescribed vitamins/supplements 1 week before surgery.        Hold Lisinopril the morning of surgery    We will call you with lab results once available.    Good luck with surgery!   Report to Radha Melgar at [facility]. Pt accept by Dr Nabil Amaya to room  826. Will arrange for transport.

## 2024-12-08 ENCOUNTER — TELEPHONE (OUTPATIENT)
Dept: CARE COORDINATION | Age: 24
End: 2024-12-08

## 2024-12-09 RX ORDER — METOPROLOL TARTRATE 25 MG/1
25 TABLET, FILM COATED ORAL 2 TIMES DAILY
Qty: 180 TABLET | Refills: 0 | Status: SHIPPED | OUTPATIENT
Start: 2024-12-09

## 2024-12-15 ENCOUNTER — TELEPHONE (OUTPATIENT)
Dept: CARE COORDINATION | Age: 24
End: 2024-12-15

## 2024-12-20 PROBLEM — F32.9 MAJOR DEPRESSIVE DISORDER: Status: RESOLVED | Noted: 2022-09-21 | Resolved: 2024-12-20

## 2024-12-20 PROBLEM — F84.0 AUTISM SPECTRUM DISORDER (HCC): Status: RESOLVED | Noted: 2023-03-01 | Resolved: 2024-12-20

## 2024-12-20 PROBLEM — F33.2 SEVERE RECURRENT MAJOR DEPRESSION WITHOUT PSYCHOTIC FEATURES (HCC): Status: RESOLVED | Noted: 2021-07-06 | Resolved: 2024-12-20

## 2024-12-20 PROBLEM — F41.9 ANXIETY DISORDER: Status: RESOLVED | Noted: 2023-03-01 | Resolved: 2024-12-20

## 2024-12-20 PROBLEM — F31.4 SEVERE DEPRESSED BIPOLAR I DISORDER WITHOUT PSYCHOTIC FEATURES (HCC): Status: RESOLVED | Noted: 2023-05-10 | Resolved: 2024-12-20

## 2024-12-20 PROBLEM — F33.2 MDD (MAJOR DEPRESSIVE DISORDER), RECURRENT EPISODE, SEVERE (HCC): Status: RESOLVED | Noted: 2022-08-05 | Resolved: 2024-12-20

## 2024-12-20 PROBLEM — F50.9 EATING DISORDER, UNSPECIFIED: Status: ACTIVE | Noted: 2023-03-01

## 2024-12-20 PROBLEM — F50.00 ANOREXIA NERVOSA (HCC): Status: RESOLVED | Noted: 2024-11-22 | Resolved: 2024-12-20

## 2025-01-09 ENCOUNTER — TELEPHONE (OUTPATIENT)
Dept: FAMILY MEDICINE CLINIC | Facility: CLINIC | Age: 25
End: 2025-01-09

## 2025-01-09 NOTE — TELEPHONE ENCOUNTER
Patient has a stye on his R eye since yesterday.   Patient wants to know if he can be seen with PCP or if he needs to go to eye Dr or IC?

## 2025-01-23 ENCOUNTER — TELEPHONE (OUTPATIENT)
Dept: FAMILY MEDICINE CLINIC | Facility: CLINIC | Age: 25
End: 2025-01-23

## 2025-01-23 NOTE — TELEPHONE ENCOUNTER
Labs due  Qravedhart message sent  Future Appointments   Date Time Provider Department Center   2/19/2025 11:00 AM Camryn Vickers APRN [facility] [place]

## 2025-01-30 ENCOUNTER — TELEPHONE (OUTPATIENT)
Age: 25
End: 2025-01-30

## 2025-01-30 VITALS — WEIGHT: 293 LBS | HEIGHT: 65 IN | BODY MASS INDEX: 48.82 KG/M2

## 2025-01-30 NOTE — TELEPHONE ENCOUNTER
Missy Johnson,   [address] [address]  Phone: [phone]    Betty Moncada,   [address]  [address]  [place] [address]  Phone: [phone]    Intake Department, [facility]  [address]  [place] [address]  Phone: [phone]    Intake Department, [place] [address] [facility] [address]  Phone: [phone]

## 2025-02-20 ENCOUNTER — OFFICE VISIT (OUTPATIENT)
Dept: FAMILY MEDICINE CLINIC | Facility: CLINIC | Age: 25
End: 2025-02-20
Payer: MEDICAID

## 2025-02-20 ENCOUNTER — LAB ENCOUNTER (OUTPATIENT)
Dept: LAB | Age: 25
End: 2025-02-20
Attending: NURSE PRACTITIONER
Payer: MEDICAID

## 2025-02-20 VITALS
BODY MASS INDEX: 48.82 KG/M2 | HEIGHT: 65 IN | WEIGHT: 293 LBS | TEMPERATURE: 98 F | OXYGEN SATURATION: 99 % | SYSTOLIC BLOOD PRESSURE: 118 MMHG | DIASTOLIC BLOOD PRESSURE: 78 MMHG | HEART RATE: 94 BPM

## 2025-02-20 DIAGNOSIS — I42.9 FAMILIAL CARDIOMYOPATHY (HCC): ICD-10-CM

## 2025-02-20 DIAGNOSIS — Z09 HOSPITAL DISCHARGE FOLLOW-UP: Primary | ICD-10-CM

## 2025-02-20 DIAGNOSIS — R73.9 HYPERGLYCEMIA: ICD-10-CM

## 2025-02-20 DIAGNOSIS — Z78.9 FEMALE-TO-MALE TRANSGENDER PERSON: ICD-10-CM

## 2025-02-20 DIAGNOSIS — E78.00 HYPERCHOLESTEREMIA: ICD-10-CM

## 2025-02-20 DIAGNOSIS — R73.03 PRE-DIABETES: ICD-10-CM

## 2025-02-20 DIAGNOSIS — E66.01 MORBID OBESITY (HCC): ICD-10-CM

## 2025-02-20 DIAGNOSIS — I10 PRIMARY HYPERTENSION: ICD-10-CM

## 2025-02-20 DIAGNOSIS — Z12.4 CERVICAL CANCER SCREENING: ICD-10-CM

## 2025-02-20 DIAGNOSIS — E87.1 HYPONATREMIA: ICD-10-CM

## 2025-02-20 DIAGNOSIS — F60.3 BORDERLINE PERSONALITY DISORDER (HCC): ICD-10-CM

## 2025-02-20 DIAGNOSIS — E55.9 VITAMIN D DEFICIENCY: ICD-10-CM

## 2025-02-20 DIAGNOSIS — E03.9 ACQUIRED HYPOTHYROIDISM: ICD-10-CM

## 2025-02-20 DIAGNOSIS — F50.9 EATING DISORDER, UNSPECIFIED TYPE: ICD-10-CM

## 2025-02-20 DIAGNOSIS — F33.2 MDD (MAJOR DEPRESSIVE DISORDER), RECURRENT SEVERE, WITHOUT PSYCHOSIS (HCC): ICD-10-CM

## 2025-02-20 DIAGNOSIS — R79.89 DECREASED THYROID STIMULATING HORMONE (TSH) LEVEL: ICD-10-CM

## 2025-02-20 DIAGNOSIS — T50.902D INTENTIONAL DRUG OVERDOSE, SUBSEQUENT ENCOUNTER: ICD-10-CM

## 2025-02-20 PROBLEM — Z91.89 HISTORY OF DRUG OVERDOSE: Status: ACTIVE | Noted: 2025-02-20

## 2025-02-20 PROBLEM — E61.1 IRON DEFICIENCY: Status: RESOLVED | Noted: 2021-06-16 | Resolved: 2025-02-20

## 2025-02-20 PROBLEM — M25.572 BILATERAL ANKLE PAIN: Status: RESOLVED | Noted: 2021-09-29 | Resolved: 2025-02-20

## 2025-02-20 PROBLEM — M25.571 BILATERAL ANKLE PAIN: Status: RESOLVED | Noted: 2021-09-29 | Resolved: 2025-02-20

## 2025-02-20 PROBLEM — R42 DIZZINESS: Status: RESOLVED | Noted: 2022-12-13 | Resolved: 2025-02-20

## 2025-02-20 PROBLEM — U07.1 COVID-19: Status: RESOLVED | Noted: 2023-12-12 | Resolved: 2025-02-20

## 2025-02-20 PROBLEM — R07.9 CHEST PAIN, UNSPECIFIED: Status: RESOLVED | Noted: 2022-12-13 | Resolved: 2025-02-20

## 2025-02-20 PROBLEM — D50.9 IRON DEFICIENCY ANEMIA: Status: RESOLVED | Noted: 2021-06-15 | Resolved: 2025-02-20

## 2025-02-20 LAB
ALBUMIN SERPL-MCNC: 4.4 G/DL (ref 3.2–4.8)
ALP LIVER SERPL-CCNC: 57 U/L
ALT SERPL-CCNC: 19 U/L
ANION GAP SERPL CALC-SCNC: 11 MMOL/L (ref 0–18)
AST SERPL-CCNC: 16 U/L (ref ?–34)
BILIRUB DIRECT SERPL-MCNC: <0.1 MG/DL (ref ?–0.3)
BILIRUB SERPL-MCNC: 0.3 MG/DL (ref 0.3–1.2)
BUN BLD-MCNC: 10 MG/DL (ref 9–23)
CALCIUM BLD-MCNC: 9.1 MG/DL (ref 8.7–10.6)
CHLORIDE SERPL-SCNC: 102 MMOL/L (ref 98–112)
CHOLEST SERPL-MCNC: 199 MG/DL (ref ?–200)
CO2 SERPL-SCNC: 27 MMOL/L (ref 21–32)
CREAT BLD-MCNC: 0.69 MG/DL
EGFRCR SERPLBLD CKD-EPI 2021: 125 ML/MIN/1.73M2 (ref 60–?)
EST. AVERAGE GLUCOSE BLD GHB EST-MCNC: 108 MG/DL (ref 68–126)
FASTING PATIENT LIPID ANSWER: YES
FASTING STATUS PATIENT QL REPORTED: YES
GLUCOSE BLD-MCNC: 102 MG/DL (ref 70–99)
HBA1C MFR BLD: 5.4 % (ref ?–5.7)
HDLC SERPL-MCNC: 43 MG/DL (ref 40–59)
LDLC SERPL CALC-MCNC: 131 MG/DL (ref ?–100)
NONHDLC SERPL-MCNC: 156 MG/DL (ref ?–130)
OSMOLALITY SERPL CALC.SUM OF ELEC: 289 MOSM/KG (ref 275–295)
POTASSIUM SERPL-SCNC: 4 MMOL/L (ref 3.5–5.1)
PROT SERPL-MCNC: 6.5 G/DL (ref 5.7–8.2)
SODIUM SERPL-SCNC: 140 MMOL/L (ref 136–145)
T3FREE SERPL-MCNC: 2.34 PG/ML (ref 2.4–4.2)
T4 FREE SERPL-MCNC: 0.9 NG/DL (ref 0.8–1.7)
TRIGL SERPL-MCNC: 141 MG/DL (ref 30–149)
TSI SER-ACNC: 0.53 UIU/ML (ref 0.55–4.78)
VLDLC SERPL CALC-MCNC: 26 MG/DL (ref 0–30)

## 2025-02-20 PROCEDURE — 80061 LIPID PANEL: CPT

## 2025-02-20 PROCEDURE — 83036 HEMOGLOBIN GLYCOSYLATED A1C: CPT

## 2025-02-20 PROCEDURE — 84443 ASSAY THYROID STIM HORMONE: CPT

## 2025-02-20 PROCEDURE — 99214 OFFICE O/P EST MOD 30 MIN: CPT | Performed by: NURSE PRACTITIONER

## 2025-02-20 PROCEDURE — 36415 COLL VENOUS BLD VENIPUNCTURE: CPT

## 2025-02-20 PROCEDURE — 84481 FREE ASSAY (FT-3): CPT

## 2025-02-20 PROCEDURE — 84439 ASSAY OF FREE THYROXINE: CPT

## 2025-02-20 PROCEDURE — 80076 HEPATIC FUNCTION PANEL: CPT

## 2025-02-20 PROCEDURE — 80048 BASIC METABOLIC PNL TOTAL CA: CPT

## 2025-02-20 NOTE — PROGRESS NOTES
HPI:     Patient is here for hospital follow up. Mom is on phone as well.     Reports that he was admitted from 2/5/2025-2/12/2025 for medication overdose at [facility]. Initial overdose on their medication (Metoprolol, Gabapentin, and Clomipromine). Poison control was contacted. EKG closely monitored. Was also on seizure precautions. Unsure if he had seizure though. No note of seizure in hospital records, was only on precaution.     This was admitted to [facility] 2/14/25-2/18/2025 admitted at [facility] psych unit. Followed up with JOSÉ with [facility] yesterday. Able to continue seeing her since he was an established patient. Stopped Clomipromine.     Has new insurance, now with Medicaid. Struggling to find therapist that accepts her insurance. Contacted [facility] and is on wait list for therapist. [facility] providers do not have program for eating disorder.     Current Outpatient Medications   Medication Sig Dispense Refill    liothyronine 5 MCG Oral Tab Take 2 tablets (10 mcg total) by mouth daily.      gabapentin 100 MG Oral Cap Take 1 capsule (100 mg total) by mouth 3 (three) times daily. 9 am, 3 pm, 9 pm 90 capsule 1    naltrexone 50 MG Oral Tab Take 2 tablets (100 mg total) by mouth at bedtime. 60 tablet 1    ziprasidone 40 MG Oral Cap Take 1 capsule (40 mg total) by mouth 2 (two) times daily with meals. 60 capsule 1    OXcarbazepine 300 MG Oral Tab Take 1 tablet (300 mg total) by mouth at bedtime. 30 tablet 0    OXcarbazepine 600 MG Oral Tab Take 1 tablet (600 mg total) by mouth 2 (two) times daily. 60 tablet 0    docusate sodium 100 MG Oral Cap Take 100 mg by mouth 2 (two) times daily.      melatonin 3 MG Oral Tab       CEPHALEXIN 500 MG Oral Cap Take 1 capsule (500 mg total) by mouth 3 (three) times daily. 20 capsule 0    cholecalciferol 50 MCG (2000 UT) Oral Tab Take 3 tablets (6,000 Units total) by mouth daily.      Probiotic Product (PROBIOTIC OR) Take 1 capsule by mouth every evening.      naproxen 500 MG Oral Tab Take 1  tablet (500 mg total) by mouth 2 (two) times daily with meals.      metoprolol tartrate 25 MG Oral Tab TAKE 1 TABLET BY MOUTH TWICE A  tablet 0    Ferrous Sulfate (IRON) 325 (65 Fe) MG Oral Tab Take 1 tablet by mouth 2 (two) times daily. 30 tablet 0    multivitamin Oral Tab Take 1 tablet by mouth daily. 30 tablet 0      Past Medical History:    ADHD (attention deficit hyperactivity disorder)    Anemia    Anxiety    Arthritis    Autism (HCC)    Bilateral ankle pain    Borderline personality disorder (HCC)    Chest pain, unspecified    Closed fracture of distal end of left fibula, unspecified fracture morphology, initial encounter (global 8/10/16)    COVID-19    Depression    Distal radius fracture, left    Dizziness    Essential hypertension    Hyperlipidemia    Intentional drug overdose (HCC)    Iron deficiency    Iron deficiency anemia    Obesity    Oral contraceptive use    Pre-diabetes    PTSD (post-traumatic stress disorder)    Suicidal behavior with attempted self-injury ([facility])    Tachycardia      History reviewed. No pertinent surgical history.   Family History   Problem Relation Age of Onset    Heart Disorder Father         CARDIOMYOPATHY    Heart Disease Father         CARDIOMYOPATHY    Depression Father     ADHD Father     Diabetes Mother         type 2    Other (Other) Maternal Grandmother         psoriatic arthritis    Bipolar Disorder Sister     Cancer Neg     Stroke Neg       Social History     Socioeconomic History    Marital status: Single   Tobacco Use    Smoking status: Never     Passive exposure: Never    Smokeless tobacco: Never    Tobacco comments:     non-smoker   Vaping Use    Vaping status: Never Used   Substance and Sexual Activity    Alcohol use: No    Drug use: No     Social Drivers of Health     Food Insecurity: No Food Insecurity (2/20/2025)    NCSS - Food Insecurity     Worried About Running Out of Food in the Last Year: No     Ran Out of Food in the Last Year: No   Transportation  Needs: No Transportation Needs (2/20/2025)    NCSS - Transportation     Lack of Transportation: No   Recent Concern: Transportation Needs - Unmet Transportation Needs (12/20/2024)    Transportation Needs     Lack of Transportation: Yes   Stress: Not on File (11/10/2023)    Received from [facility]    Stress     Stress: 0   Housing Stability: Not At Risk (2/20/2025)    NCSS - Housing/Utilities     Has Housing: Yes     Worried About Losing Housing: No     Unable to Get Utilities: No         REVIEW OF SYSTEMS:   Review of Systems   Respiratory:  Negative for cough and shortness of breath.    Cardiovascular:  Negative for chest pain and palpitations.   Neurological:  Negative for seizures (never that they can recall).   Psychiatric/Behavioral:  Positive for dysphoric mood. Negative for confusion, sleep disturbance and suicidal ideas. The patient is nervous/anxious.        EXAM:   /78   Pulse 94   Temp 98 °F (36.7 °C)   Ht 5' 5\" (1.651 m)   Wt (!) 337 lb (152.9 kg)   LMP 02/01/2025 (Approximate)   SpO2 99%   BMI 56.08 kg/m²   Physical Exam  Constitutional:       General: He is not in acute distress.     Appearance: He is obese.   Cardiovascular:      Rate and Rhythm: Normal rate and regular rhythm.      Heart sounds: Normal heart sounds.   Pulmonary:      Effort: Pulmonary effort is normal.      Breath sounds: Normal breath sounds.   Neurological:      Mental Status: He is alert.   Psychiatric:         Mood and Affect: Mood normal.         Behavior: Behavior normal.         Thought Content: Thought content normal.         ASSESSMENT AND PLAN:   Diagnoses and all orders for this visit:    Hospital discharge follow-up    Intentional drug overdose, subsequent encounter  Comments:  following with psych NP at [facility], working on getting new therapist since he had insurance change.  consult placed  Orders:  -     [facility] Referral - In Network    Borderline personality disorder  (HCC)  Comments:  following with psych NP at [facility], working on getting new therapist since he had insurance change.  consult placed  Orders:  -     [facility] Referral - In Network    MDD (major depressive disorder), recurrent severe, without psychosis (HCC)  Comments:  following with psych NP at [facility], working on getting new therapist since he had insurance change.  consult placed  Orders:  -     [facility] Referral - In Network    Eating disorder, unspecified type  Comments:  following with psych NP at [facility], working on getting new therapist since he had insurance change.  consult placed  Orders:  -     [facility] Referral - In Network    Female-to-male transgender person    Hyponatremia  Comments:  recheck lab work today  Orders:  -     Basic Metabolic Panel (8) [E]; Future    Hyperglycemia  Comments:  recheck lab work today  Orders:  -     Basic Metabolic Panel (8) [E]; Future    Acquired hypothyroidism  Comments:  recent thyroid function testing normal    Pre-diabetes  Comments:  recheck lab work today  Orders:  -     Hemoglobin A1C [E]; Future  -     Basic Metabolic Panel (8) [E]; Future    Morbid obesity (HCC)    Vitamin D deficiency  Comments:  recent vitamin D normal, continue supplement    Primary hypertension  Comments:  BP at goal, continue Metoprolol, now seeing cardiology as well    Hypercholesteremia  Comments:  check lipid level today  Orders:  -     Lipid Panel [E]; Future    Familial cardiomyopathy (HCC)  Comments:  father recently had cardiac transplant, had echo in 2022, also now seeing cardiology at [facility]. Will be getting heart monitor today. Being evaluated for POTS    Cervical cancer screening  Comments:  would like referral for gyne, also interested in getting IUD so will discuss with them as well  Orders:  -     [facility] Referral - In Network

## 2025-02-20 NOTE — PATIENT INSTRUCTIONS
[facility]     [phone]    Fax: [phone]    Locations:    [address] [place] [address]    [address] [place] [address]

## 2025-02-21 ENCOUNTER — PATIENT MESSAGE (OUTPATIENT)
Dept: FAMILY MEDICINE CLINIC | Facility: CLINIC | Age: 25
End: 2025-02-21

## 2025-02-21 ENCOUNTER — TELEPHONE (OUTPATIENT)
Dept: FAMILY MEDICINE CLINIC | Facility: CLINIC | Age: 25
End: 2025-02-21

## 2025-02-21 DIAGNOSIS — Z23 NEED FOR VACCINATION: Primary | ICD-10-CM

## 2025-02-21 DIAGNOSIS — E03.9 ACQUIRED HYPOTHYROIDISM: Primary | ICD-10-CM

## 2025-02-21 RX ORDER — LIOTHYRONINE SODIUM 5 UG/1
5 TABLET ORAL DAILY
Qty: 30 TABLET | Refills: 0 | Status: SHIPPED | OUTPATIENT
Start: 2025-02-21

## 2025-02-21 NOTE — TELEPHONE ENCOUNTER
Order in  Future Appointments   Date Time Provider Department Center   3/1/2025  9:00 AM [facility] NURSE [facility] [place]   3/5/2025  1:30 PM Camryn Vickers APRN [facility] [place]

## 2025-02-21 NOTE — TELEPHONE ENCOUNTER
----- Message from Lolita Johnson sent at 2/21/2025  7:37 AM CST -----  Results reviewed.   TSH low, needs to decrease to Liothyronine 5 mcg daily (currently on 10 mcg). Recheck TSH, Free T4 and Free T3 in 4 weeks  Cholesterol improved, please forward to cardiology  Chemistries normal  Liver enzymes normal  No diabetes or prediabetes

## 2025-02-21 NOTE — TELEPHONE ENCOUNTER
Patient called to schedule her second HPV shot.     Future Appointments   Date Time Provider Department Center   3/1/2025  9:00 AM [facility] NURSE [facility] [place]   3/5/2025  1:30 PM Camryn Vickers APRN [facility]

## 2025-02-21 NOTE — TELEPHONE ENCOUNTER
Per CDC guidelines    Three doses of HPV vaccine are recommended for teens and young adults who start the series at ages 15 through 26 years, and for immunocompromised persons.    The recommended three-dose schedule is 0, 1-2 and 6 months.  Three doses are recommended for immunocompromised persons (including those with HIV infection) aged 9 through 26 years.

## 2025-02-26 ENCOUNTER — TELEPHONE (OUTPATIENT)
Age: 25
End: 2025-02-26

## 2025-02-28 PROBLEM — F41.1 GENERALIZED ANXIETY DISORDER: Status: ACTIVE | Noted: 2025-02-28

## 2025-02-28 NOTE — TELEPHONE ENCOUNTER
Future Appointments   Date Time Provider Department Center   3/5/2025  1:30 PM Camryn Vickers APRN [facility] [place]

## 2025-03-12 RX ORDER — METOPROLOL TARTRATE 25 MG/1
25 TABLET, FILM COATED ORAL 2 TIMES DAILY
Qty: 180 TABLET | Refills: 0 | Status: SHIPPED | OUTPATIENT
Start: 2025-03-12

## 2025-03-17 ENCOUNTER — TELEPHONE (OUTPATIENT)
Dept: FAMILY MEDICINE CLINIC | Facility: CLINIC | Age: 25
End: 2025-03-17

## 2025-03-17 PROBLEM — F33.2 SEVERE EPISODE OF RECURRENT MAJOR DEPRESSIVE DISORDER, WITHOUT PSYCHOTIC FEATURES (HCC): Status: ACTIVE | Noted: 2023-12-14

## 2025-03-17 RX ORDER — LIOTHYRONINE SODIUM 5 UG/1
10 TABLET ORAL DAILY
Qty: 180 TABLET | Refills: 0 | Status: SHIPPED | OUTPATIENT
Start: 2025-03-17 | End: 2025-06-15

## 2025-03-17 NOTE — TELEPHONE ENCOUNTER
Received refill request for Liothyronine 5mcg tabs from [facility].    Last office visit with JOSÉ Williamson on 2/20/2025 for hospital follow-up    Last refill 2/21/2025 - #30

## 2025-04-18 ENCOUNTER — TELEPHONE (OUTPATIENT)
Dept: FAMILY MEDICINE CLINIC | Facility: CLINIC | Age: 25
End: 2025-04-18

## 2025-04-18 NOTE — TELEPHONE ENCOUNTER
Patient needs a refill of metoprolol tartrate 25 MG Oral Tab sent to a new pharmacy [facility] on [address] in [place].

## 2025-04-19 RX ORDER — METOPROLOL TARTRATE 25 MG/1
25 TABLET, FILM COATED ORAL 2 TIMES DAILY
Qty: 180 TABLET | Refills: 0 | Status: SHIPPED | OUTPATIENT
Start: 2025-04-19

## 2025-04-19 NOTE — TELEPHONE ENCOUNTER
Refill was last sent to [facility]  Prescription sent to [facility] instead per patient request

## 2025-06-17 ENCOUNTER — OFFICE VISIT (OUTPATIENT)
Facility: LOCATION | Age: 25
End: 2025-06-17
Payer: MEDICAID

## 2025-06-17 DIAGNOSIS — G47.33 OBSTRUCTIVE SLEEP APNEA SYNDROME: Primary | ICD-10-CM

## 2025-06-17 DIAGNOSIS — J35.3 TONSILLAR AND ADENOID HYPERTROPHY: ICD-10-CM

## 2025-06-17 PROCEDURE — 99213 OFFICE O/P EST LOW 20 MIN: CPT | Performed by: OTOLARYNGOLOGY

## 2025-06-17 RX ORDER — AMOXICILLIN 875 MG/1
875 TABLET, COATED ORAL 2 TIMES DAILY
Qty: 20 TABLET | Refills: 0 | Status: SHIPPED | OUTPATIENT
Start: 2025-06-17

## 2025-06-17 NOTE — PROGRESS NOTES
Roxane Tyler is a 24 year old adult.   Chief Complaint   Patient presents with    Throat Problem     HPI:   Patient has had a sore throat for 1 week.  Patient rarely gets tonsillitis.  Patient does have a history of sleep apnea.    REVIEW OF SYSTEMS:   GENERAL HEALTH: feels well otherwise  GENERAL : denies fever, chills, sweats, weight loss, weight gain  SKIN: denies any unusual skin lesions or rashes  RESPIRATORY: denies shortness of breath with exertion  NEURO: denies headaches    EXAM:   LMP 02/01/2025 (Approximate)     System Findings Details   Constitutional  Overall appearance - Normal.   Psychiatric  Orientation - Oriented to time, place, person & situation. Appropriate mood and affect.   Head/Face  Facial features -- Normal. Skull - Normal.   Eyes  Pupils equal ,round ,react to light and accomidate   Ears, Nose, Throat, Neck  Ears clear nose congestion oral cavity clear oropharynx +2/4 erythematous tonsils with some debris neck no masses   Neurological  Memory - Normal. Cranial nerves - Cranial nerves II through XII grossly intact.   Lymph Detail  Submental. Submandibular. Anterior cervical. Posterior cervical. Supraclavicular.       ASSESSMENT AND PLAN:   1. Obstructive sleep apnea syndrome  Instructed patient that first-line treatment is to consider CPAP therapy and also weight loss.  - CPAP Titration Study  - General sleep study; Future    2. Tonsillar and adenoid hypertrophy  Patient has acute tonsillitis.  Patient will start on amoxicillin.  In the future I might consider tonsillectomy as treatment for sleep apnea however as stated above I would like consideration toward CPAP and weight loss first.  - CPAP Titration Study  - General sleep study; Future      The patient indicates understanding of these issues and agrees to the plan.    No follow-ups on file.    Nader Call MD  6/17/2025  11:11 AM

## 2025-06-19 ENCOUNTER — LAB ENCOUNTER (OUTPATIENT)
Dept: LAB | Age: 25
End: 2025-06-19
Attending: NURSE PRACTITIONER
Payer: MEDICAID

## 2025-06-19 ENCOUNTER — PATIENT MESSAGE (OUTPATIENT)
Dept: FAMILY MEDICINE CLINIC | Facility: CLINIC | Age: 25
End: 2025-06-19

## 2025-06-19 DIAGNOSIS — E78.00 ELEVATED LDL CHOLESTEROL LEVEL: ICD-10-CM

## 2025-06-19 PROBLEM — J35.1 TONSILLAR HYPERTROPHY: Status: ACTIVE | Noted: 2025-06-19

## 2025-06-19 LAB
ALBUMIN SERPL-MCNC: 4.9 G/DL (ref 3.2–4.8)
ALBUMIN/GLOB SERPL: 1.9 {RATIO} (ref 1–2)
ALP LIVER SERPL-CCNC: 74 U/L
ALT SERPL-CCNC: 35 U/L
ANION GAP SERPL CALC-SCNC: 10 MMOL/L (ref 0–18)
AST SERPL-CCNC: 28 U/L (ref ?–34)
BILIRUB SERPL-MCNC: 0.3 MG/DL (ref 0.3–1.2)
BUN BLD-MCNC: 13 MG/DL (ref 9–23)
CALCIUM BLD-MCNC: 9.8 MG/DL (ref 8.7–10.6)
CHLORIDE SERPL-SCNC: 104 MMOL/L (ref 98–112)
CHOLEST SERPL-MCNC: 224 MG/DL (ref ?–200)
CO2 SERPL-SCNC: 27 MMOL/L (ref 21–32)
CREAT BLD-MCNC: 0.74 MG/DL
EGFRCR SERPLBLD CKD-EPI 2021: 117 ML/MIN/1.73M2 (ref 60–?)
FASTING PATIENT LIPID ANSWER: YES
FASTING STATUS PATIENT QL REPORTED: YES
GLOBULIN PLAS-MCNC: 2.6 G/DL (ref 2–3.5)
GLUCOSE BLD-MCNC: 109 MG/DL (ref 70–99)
HDLC SERPL-MCNC: 49 MG/DL (ref 40–59)
LDLC SERPL CALC-MCNC: 157 MG/DL (ref ?–100)
NONHDLC SERPL-MCNC: 175 MG/DL (ref ?–130)
OSMOLALITY SERPL CALC.SUM OF ELEC: 293 MOSM/KG (ref 275–295)
POTASSIUM SERPL-SCNC: 3.8 MMOL/L (ref 3.5–5.1)
PROT SERPL-MCNC: 7.5 G/DL (ref 5.7–8.2)
SODIUM SERPL-SCNC: 141 MMOL/L (ref 136–145)
TRIGL SERPL-MCNC: 102 MG/DL (ref 30–149)
VLDLC SERPL CALC-MCNC: 20 MG/DL (ref 0–30)

## 2025-06-19 PROCEDURE — 80053 COMPREHEN METABOLIC PANEL: CPT

## 2025-06-19 PROCEDURE — 36415 COLL VENOUS BLD VENIPUNCTURE: CPT

## 2025-06-19 PROCEDURE — 80061 LIPID PANEL: CPT

## 2025-06-19 NOTE — TELEPHONE ENCOUNTER
----- Message from Lolita Johnson sent at 6/19/2025 12:54 PM CDT -----  Results reviewed.   Chemistries stable  Cholesterol increased, please forward to cardiologist for further recommendation   ----- Message -----  From: Lab, Background User  Sent: 6/19/2025  12:26 PM CDT  To: JOSÉ Chew

## 2025-06-19 NOTE — TELEPHONE ENCOUNTER
Results faxed to cardiology-   Eliza Das MD   [address] [address]   Phone: tel:[phone]   fax:[phone]

## 2025-06-21 RX ORDER — LIOTHYRONINE SODIUM 5 UG/1
10 TABLET ORAL DAILY
Qty: 180 TABLET | Refills: 0 | Status: SHIPPED | OUTPATIENT
Start: 2025-06-21

## 2025-06-21 NOTE — TELEPHONE ENCOUNTER
LOV: 25 for follow up      liothyronine 5 MCG Oral Tab ()  Take 2 tablets (10 mcg total) by mouth daily. Dispense: 180 tablet, Refills: 0 ordered   2025 06/15/2025       thyroid Medication Protocol [id number] 07:00 AM   Protocol Details TSH in past 12 months    Last TSH value is normal    In person appointment or virtual visit in the past 12 mos or appointment in next 3 mos    Medication is active on med list     Future Appointments   Date Time Provider Department Center   2025  3:15 PM Camryn Vickers APRN [facility] [place]   2025  7:30 AM Megan Kaiser MD [facility] [place]

## 2025-06-30 ENCOUNTER — OFFICE VISIT (OUTPATIENT)
Facility: CLINIC | Age: 25
End: 2025-06-30
Payer: MEDICAID

## 2025-06-30 VITALS
HEIGHT: 65 IN | WEIGHT: 293 LBS | HEART RATE: 108 BPM | BODY MASS INDEX: 48.82 KG/M2 | DIASTOLIC BLOOD PRESSURE: 66 MMHG | SYSTOLIC BLOOD PRESSURE: 120 MMHG

## 2025-06-30 VITALS
SYSTOLIC BLOOD PRESSURE: 102 MMHG | OXYGEN SATURATION: 98 % | WEIGHT: 293 LBS | HEART RATE: 75 BPM | RESPIRATION RATE: 16 BRPM | BODY MASS INDEX: 48.82 KG/M2 | HEIGHT: 65 IN | DIASTOLIC BLOOD PRESSURE: 68 MMHG

## 2025-06-30 DIAGNOSIS — Z11.3 SCREEN FOR STD (SEXUALLY TRANSMITTED DISEASE): ICD-10-CM

## 2025-06-30 DIAGNOSIS — E66.01 MORBID OBESITY (HCC): ICD-10-CM

## 2025-06-30 DIAGNOSIS — G47.33 OBSTRUCTIVE SLEEP APNEA: Primary | ICD-10-CM

## 2025-06-30 DIAGNOSIS — J35.1 TONSILLAR HYPERTROPHY: ICD-10-CM

## 2025-06-30 DIAGNOSIS — Z71.85 HPV VACCINE COUNSELING: ICD-10-CM

## 2025-06-30 DIAGNOSIS — Z23 ENCOUNTER FOR IMMUNIZATION: ICD-10-CM

## 2025-06-30 DIAGNOSIS — Z01.419 ENCOUNTER FOR GYNECOLOGICAL EXAMINATION WITHOUT ABNORMAL FINDING: Primary | ICD-10-CM

## 2025-06-30 DIAGNOSIS — Z30.09 COUNSELING FOR BIRTH CONTROL REGARDING INTRAUTERINE DEVICE (IUD): ICD-10-CM

## 2025-06-30 DIAGNOSIS — Z12.4 CERVICAL CANCER SCREENING: ICD-10-CM

## 2025-06-30 PROCEDURE — 90471 IMMUNIZATION ADMIN: CPT | Performed by: STUDENT IN AN ORGANIZED HEALTH CARE EDUCATION/TRAINING PROGRAM

## 2025-06-30 PROCEDURE — 87591 N.GONORRHOEAE DNA AMP PROB: CPT | Performed by: STUDENT IN AN ORGANIZED HEALTH CARE EDUCATION/TRAINING PROGRAM

## 2025-06-30 PROCEDURE — 99214 OFFICE O/P EST MOD 30 MIN: CPT | Performed by: PHYSICIAN ASSISTANT

## 2025-06-30 PROCEDURE — 99385 PREV VISIT NEW AGE 18-39: CPT | Performed by: STUDENT IN AN ORGANIZED HEALTH CARE EDUCATION/TRAINING PROGRAM

## 2025-06-30 PROCEDURE — 90651 9VHPV VACCINE 2/3 DOSE IM: CPT | Performed by: STUDENT IN AN ORGANIZED HEALTH CARE EDUCATION/TRAINING PROGRAM

## 2025-06-30 PROCEDURE — 87491 CHLMYD TRACH DNA AMP PROBE: CPT | Performed by: STUDENT IN AN ORGANIZED HEALTH CARE EDUCATION/TRAINING PROGRAM

## 2025-06-30 PROCEDURE — 88175 CYTOPATH C/V AUTO FLUID REDO: CPT | Performed by: STUDENT IN AN ORGANIZED HEALTH CARE EDUCATION/TRAINING PROGRAM

## 2025-06-30 RX ORDER — MISOPROSTOL 200 UG/1
TABLET ORAL
Qty: 2 TABLET | Refills: 0 | Status: SHIPPED | OUTPATIENT
Start: 2025-06-30

## 2025-06-30 RX ORDER — ONDANSETRON 4 MG/1
4 TABLET, ORALLY DISINTEGRATING ORAL EVERY 6 HOURS PRN
Qty: 10 TABLET | Refills: 0 | Status: SHIPPED | OUTPATIENT
Start: 2025-06-30

## 2025-06-30 RX ORDER — IBUPROFEN 400 MG/1
400 TABLET, FILM COATED ORAL EVERY 6 HOURS PRN
COMMUNITY

## 2025-06-30 RX ORDER — ACETAMINOPHEN 500 MG
500 TABLET ORAL EVERY 6 HOURS PRN
COMMUNITY

## 2025-06-30 RX ORDER — IBUPROFEN 800 MG/1
800 TABLET, FILM COATED ORAL EVERY 8 HOURS PRN
Qty: 10 TABLET | Refills: 0 | Status: SHIPPED | OUTPATIENT
Start: 2025-06-30
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
## 2025-06-30 NOTE — PATIENT INSTRUCTIONS
Please make an office visit for intrauterine device placement procedure visit.     It is best to have an IUD placed during your period to help sync up your bleeding as well as the cervix is naturally slightly dilated during this time.     Please take Advil/Ibuprofen 600 mg by mouth at least 1-2 hours prior to your procedure     Please take the medication Cytotec 200 mcg orally with one dose in the evening before your procedure and the second dose in the morning the day of your procedure. Drink 1 full glass of water with the medication.     This medication may cause cramping abdominal pain and you make take Ibuprofen as needed for pain.      Please make appointment for placement during your menses to assist in success of placement.

## 2025-06-30 NOTE — PROGRESS NOTES
[facility]  Obstetrics and Gynecology   History & Physical  New Patient    Chief complaint:   Chief Complaint   Patient presents with    Wellness Visit     WWE  - Preventative/Wellness form signed by patient.   Thinking about 2nd dose of Gardasil   - pt is with mom    Contraception     Discuss IUD options only       Subjective:     HPI: Rupert Tyler is a 24 year old  with Patient's last menstrual period was 2025 (approximate).     Here for: annual exam. Patient is a transgender male. Is not on any hormone therapy and still has all female organs. Is considering HRT in the future as well as possible bottom surgery. Is getting regular periods every month, varies in amount but overall lasts about 5-6 days. Does have some dysmenorrhea but then it goes away. 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 Has been sexually active in the past but is not currently. When they are sexually active it is with males. Is OK with GC/CT testing today. Mother states she believes that pt did get a pap smear in the past but pt doesn't remember. Is ok with getting one today. Also got one dose of Gardisil 10/2023, would like the 2nd shot today.     Family history significant for maternal aunt with cervical cancer, and other maternal aunt with breast cancer in her 50s - had genetic testing and was negative.     Chaperone for exam was accepted. Pt's mother is also in the room today.     PCP: Kiah Aguilar MD   Patient Care Team:  Kiah Aguilar MD as PCP - General  Neha Marin MD (Sports Medicine)  Chula Garcia LCSW as Clinical Therapist  Camryn Vickers APRN (Nurse Practitioner, Psychiatric/Mental Health)  Eliza Das (Cardiovascular Diseases)  Dane Pepe MD (SURGERY, ORTHOPEDIC)  Maddy Glasgow DO (Neurology, Sleep Medicine)  Nader Call MD (OTOLARYNGOLOGY)       GYN Hx:   No data recorded    Patient's last menstrual period  was 2025 (approximate).       Menses: qmonth, lasting 5-6 days. Varies in amount but not usually heavy  Mild dysmenorrhea  Last pap: mom believes they got one, pt does not recall  HPV vaccine: 1st shot of gardisil 10/2023  Sexual history: Active? Has been in the past, not currently, mostly male partner(s)  Denies history of STDs.   Birth control? Yes, the pill in the past  Reproductive life plan? Not really   Family history of gynecologic cancers?: maternal aunt cervical cancer, maternal aunt breast cancer (early 50s - genetic testing and negative)     Health Maintenance  Breast cancer screening: mammogram age 40  Colon cancer screening: colonoscopy age 45   Osteoporosis screening: DEXA scan age 65     OB History:  OB History    Para Term  AB Living   0 0 0 0 0 0   SAB IAB Ectopic Multiple Live Births   0 0 0 0 0       Meds:  Medications Ordered Prior to Encounter[1]    All:  Allergies[2]    PMH:  Past Medical History[3]     PSH:  Past Surgical History[4]    Social History:  Short Social Hx on File[5]     Family History:  Family History[6]    Immunization History:  Immunization History   Administered Date(s) Administered    >= 3 Yrs, FLUZONE, Pres Free (30881), Influenza Vaccine, Flu Clinic 2013    >=3 YRS FLUZONE OR FLUARIX QUAD PRESERVE FREE SINGLE DOSE (61504) FLU CLINIC 10/03/2015    Covid-19 Vaccine Moderna 100 mcg/0.5 ml 2021, 2021    Covid-19 Vaccine Moderna 50 Mcg/0.25 Ml 2022    DTAP 2002, 2002, 05/15/2002, 2003, 2005    FLULAVAL 6 months & older 0.5 ml Prefilled syringe (53725) 2022    FLUZONE 6 months and older PFS 0.5 ml (67109) 10/03/2015, 2016, 2017, 2018, 2019, 2020, 10/30/2021    HEP B/HIB 2002, 2002, 2003    Hpv Virus Vaccine 9 Khadijah Im 10/23/2023    IPV 2002, 2002, 2003, 2005    Influenza 2003, 10/25/2006, 10/27/2007, 2008, 2010,  12/05/2016    Influenza Vaccine, Preserv Free 09/14/2019    Influenza Vaccine, trivalent (IIV3), 0.5mL IM 12/10/2009    Influenza Vaccine, trivalent (IIV3), PF 0.5mL (14123) 10/13/2024    MMR 11/20/2002, 12/20/2005    Meningococcal-Menactra 06/10/2016    Meningococcal-Menveo 10-55 YEARS 06/19/2019    Meningococcal-Menveo 2month-55yr 06/19/2019    Pfizer Covid-19 Vaccine 30mcg/0.3ml 12yrs+ 10/13/2024    Pneumococcal (Prevnar 7) 01/16/2002, 03/13/2002, 05/15/2002, 02/20/2003    TDAP 08/09/2013, 10/23/2023    Varicella 11/20/2002, 12/07/2007       Depression Scale      PHQ-2 SCORE: 1  , done 6/19/2025   Little interest or pleasure in doing things: 1    Feeling tired or having little energy: 3    Poor appetite or overeating: 3    Feeling bad about yourself - or that you are a failure or have let yourself or your family down: 3    If you checked off any problems, how difficult have these problems made it for you to do your work, take care of things at home, or get along with other people?: Very difficult    Last [place] Suicide Screening on 6/30/2025 was 2- Low Risk:  Encourage the patient to initiate behavioral health services with a [facility] Navigation Order or BHI order.  Educate patient to call [facility] at [phone] if symptoms worsen..      Constitutional:  Denies fatigue, night sweats, hot flashes  Eyes:  denies blurred or double vision  Cardiovascular:  denies chest pain or palpitations  Respiratory:  denies shortness of breath  Gastrointestinal:  denies heartburn, abdominal pain, diarrhea or constipation  Genitourinary:  denies dysuria, incontinence, abnormal vaginal discharge, vaginal itching  Musculoskeletal:  denies back pain.  Skin/Breast:  Denies any breast pain, lumps, or discharge.   Neurological:  denies headaches, extremity weakness or numbness.  Psychiatric: denies depression or anxiety.  Endocrine:   denies excessive thirst or urination.  Heme/Lymph:  denies history of anemia, easy bruising  or bleeding.    Objective:     Vitals:    06/30/25 0733   BP: 120/66   Pulse: 108   Weight: (!) 345 lb (156.5 kg)   Height: 65\"       Body mass index is 57.41 kg/m².    Physical Exam:  Constitutional: well developed, well nourished  Head/Face: normocephalic  Lymphatic: no abnormal supraclavicular or axillary adenopathy is noted  Breast: normal without palpable masses, tenderness, asymmetry, nipple discharge, nipple retraction or skin changes  Abdomen:  soft, nontender, nondistended, no masses  Skin/Hair: no unusual rashes or bruises  Extremities: no edema, no cyanosis  Psychiatric:  Oriented to time, place, person and situation. Appropriate mood and affect    Pelvic Exam:  External Genitalia: normal appearance, hair distribution, and no lesions  Urethral Meatus:  normal in size, location, without lesions and prolapse  Bladder:  No fullness, masses or tenderness  Vagina:  Normal appearance without lesions, no abnormal discharge  Cervix:  Normal without tenderness on motion  Uterus: normal in size, contour, position, mobility, without tenderness  Adnexa: normal without masses or tenderness  Perineum: normal  Anus: no hemorrhoids     Labs:  Lab Results   Component Value Date    WBC 10.4 03/16/2025    RBC 4.64 03/16/2025    HGB 13.8 03/16/2025    HCT 40.1 03/16/2025    MCV 86.4 03/16/2025    MCH 29.7 03/16/2025    MCHC 34.4 03/16/2025    RDW 12.6 03/16/2025    .0 03/16/2025        Lab Results   Component Value Date     (H) 06/19/2025    BUN 13 06/19/2025    BUNCREA 19.3 07/07/2021    CREATSERUM 0.74 06/19/2025    ANIONGAP 10 06/19/2025     04/03/2015    GFRNAA 126 07/18/2022    GFRAA 146 07/18/2022    CA 9.8 06/19/2025    OSMOCALC 293 06/19/2025    ALKPHO 74 06/19/2025    AST 28 06/19/2025    ALT 35 06/19/2025    BILT 0.3 06/19/2025    TP 7.5 06/19/2025    ALB 4.9 (H) 06/19/2025    GLOBULIN 2.6 06/19/2025     06/19/2025    K 3.8 06/19/2025     06/19/2025    CO2 27.0 06/19/2025       Lab  Results   Component Value Date    CHOLEST 224 (H) 2025    TRIG 102 2025    HDL 49 2025     (H) 2025    VLDL 20 2025    TCHDLRATIO 3.78 2015    NONHDLC 175 (H) 2025        Lab Results   Component Value Date    T4F 0.8 2025    TSH 3.008 2025        Lab Results   Component Value Date     2025    A1C 5.4 2025       Imaging:  No results found.     Assessment and Plan:     Rupert Tyler is a 24 year old  female here for annual gynecologic exam     Diagnoses and all orders for this visit:    Encounter for gynecological examination without abnormal finding    Cervical cancer screening  -     ThinPrep PAP Smear B; Future    Screen for STD (sexually transmitted disease)  -     Chlamydia/Gc Amplification; Future    HPV vaccine counseling  -     HPV HUMAN PAPILLOMA VIRUS VACC 9 MARY 3 DOSE IM    Encounter for immunization  -     HPV HUMAN PAPILLOMA VIRUS VACC 9 MAYR 3 DOSE IM         #Cervical cancer screening  - Last pap smear: pt does not recall    - Denies abnormal  - ASCCP guidelines reviewed  - Plan to repeat pap smear today  - HPV vaccine: completed 1st dose, will get 2nd dose today    #Contraception counseling  - discussed with patient options for contraception including OCP, Nuvaring, Depo Provera, LARC (Nexplanon versus IUD), permanent sterilization  - risks, benefits and alternatives discussed   - pt expressed interest in Mirena IUD given periods and cramping causes gender dysphoria and would like something that would stop this   - Mirena IUD handout provided  - pt does not tolerate exams well so will plan on pre-medication and lidocaine block for placement    Health maintenance:  Contraception: plan Mirena IUD   STD screening: GC/CT  Breast exam: benign  Pelvic exam: benign  Mammogram: age 41   Colonoscopy: 47 yo   DEXA: 65 yo      RTC for Mirena IUD placement     Megan Kaiser MD  [facility] - OBGYN    Note to patient and family:  The  21st Century Cures Act makes medical notes available to patients in the interest of transparency.  However, please be advised that this is a medical document.  It is intended as a peer to peer communication.  It is written in medical language and may contain abbreviations or verbiage that are technical and unfamiliar.  It may appear blunt or direct.  Medical documents are intended to carry relevant information, facts as evident, and the clinical opinion of the practitioner.       [1]   Current Outpatient Medications on File Prior to Visit   Medication Sig Dispense Refill    acetaminophen 500 MG Oral Tab Take 1 tablet (500 mg total) by mouth every 6 (six) hours as needed for Pain.      ibuprofen 400 MG Oral Tab Take 1 tablet (400 mg total) by mouth every 6 (six) hours as needed for Pain.      OXcarbazepine 600 MG Oral Tab Take 1 tablet (600 mg total) by mouth 2 (two) times daily. 60 tablet 2    OXcarbazepine 300 MG Oral Tab Take 1 tablet (300 mg total) by mouth at bedtime. 30 tablet 2    naltrexone 50 MG Oral Tab Take 2 tablets (100 mg total) by mouth at bedtime. 60 tablet 0    LIOTHYRONINE 5 MCG Oral Tab TAKE 2 TABLETS BY MOUTH DAILY. 180 tablet 0    gabapentin 100 MG Oral Cap Take 1 capsule (100 mg total) by mouth 3 (three) times daily. 9 am, 3 pm, 9 pm 90 capsule 1    FLUoxetine 20 MG Oral Cap Take 1 capsule (20 mg total) by mouth daily. 30 capsule 2    metoprolol tartrate 25 MG Oral Tab Take 1 tablet (25 mg total) by mouth 2 (two) times daily. 180 tablet 0    docusate sodium 100 MG Oral Cap Take 100 mg by mouth 2 (two) times daily.      Melatonin 5 MG Oral Tab Take 1-3 tablets (5-15 mg total) by mouth at bedtime.      cholecalciferol 50 MCG (2000 UT) Oral Tab Take 3 tablets (6,000 Units total) by mouth every morning.      Probiotic Product (PROBIOTIC OR) Take 1 capsule by mouth every evening.      Ferrous Sulfate (IRON) 325 (65 Fe) MG Oral Tab Take 1 tablet by mouth 2 (two) times daily. 30 tablet 0     multivitamin Oral Tab Take 1 tablet by mouth daily. 30 tablet 0    amoxicillin 875 MG Oral Tab Take 1 tablet (875 mg total) by mouth 2 (two) times daily. (Patient not taking: Reported on 6/30/2025) 20 tablet 0    naproxen 500 MG Oral Tab Take 1 tablet (500 mg total) by mouth 2 (two) times daily with meals. (Patient not taking: Reported on 6/30/2025)       No current facility-administered medications on file prior to visit.   [2]   Allergies  Allergen Reactions    Adhesive Tape HIVES     \"break out\"    Seasonal OTHER (SEE COMMENTS)     Congestion, runny nose, nose bleeds   [3]   Past Medical History:  Diagnosis Date    ADHD (attention deficit hyperactivity disorder)     Allergic rhinitis     Anemia     Anxiety     Arthritis     Autism ([facility]) 2020    Bilateral ankle pain 09/29/2021    Borderline personality disorder ([facility])     Chest pain, unspecified 12/13/2022    Closed fracture of distal end of left fibula, unspecified fracture morphology, initial encounter (global 8/10/16) 05/12/2016    COVID-19 12/12/2023    Depression     Diabetes ([facility]) N/A    Pre-Diabetic    Distal radius fracture, left 06/19/2013    Dizziness 12/13/2022    Essential hypertension     Hyperlipidemia     Intentional drug overdose ([facility])     Iron deficiency 06/16/2021    Iron deficiency anemia 06/15/2021    Obesity     Oral contraceptive use 08/20/2021    Pre-diabetes     PTSD (post-traumatic stress disorder)     Suicidal behavior with attempted self-injury ([facility]) 2021    Tachycardia    [4]   Past Surgical History:  Procedure Laterality Date    [place] teeth removed     [5]   Social History  Socioeconomic History    Marital status: Single   Tobacco Use    Smoking status: Never     Passive exposure: Never    Smokeless tobacco: Never    Tobacco comments:     non-smoker   Vaping Use    Vaping status: Never Used   Substance and Sexual Activity    Alcohol use: No    Drug use: No    Sexual activity: Not Currently     Social Drivers of Health     Food  Insecurity: No Food Insecurity (2/20/2025)    NCSS - Food Insecurity     Worried About Running Out of Food in the Last Year: No     Ran Out of Food in the Last Year: No   Transportation Needs: No Transportation Needs (2/20/2025)    NCSS - Transportation     Lack of Transportation: No   Recent Concern: Transportation Needs - Unmet Transportation Needs (12/20/2024)    Transportation Needs     Lack of Transportation: Yes   Stress: Not on File (11/10/2023)    Received from [facility]    Stress     Stress: 0   Housing Stability: Not At Risk (2/20/2025)    NCSS - Housing/Utilities     Has Housing: Yes     Worried About Losing Housing: No     Unable to Get Utilities: No   [6]   Family History  Problem Relation Age of Onset    Heart Disorder Father         CARDIOMYOPATHY    Heart Disease Father         CARDIOMYOPATHY    Depression Father     ADHD Father     Diabetes Mother         type 2    Other (Other) Maternal Grandmother         psoriatic arthritis    No Known Problems Maternal Grandfather     No Known Problems Paternal Grandmother     No Known Problems Paternal Grandfather     Bipolar Disorder Sister     Breast Cancer Maternal Aunt         dx age 50s    Cancer Maternal Aunt         cervical dx age 40s    Stroke Neg     Prostate Cancer Neg     Ovarian Cancer Neg     Uterine Cancer Neg     Pancreatic Cancer Neg     Colon Cancer Neg

## 2025-07-01 LAB
C TRACH DNA SPEC QL NAA+PROBE: NEGATIVE
N GONORRHOEA DNA SPEC QL NAA+PROBE: NEGATIVE

## 2025-07-15 ENCOUNTER — HOSPITAL ENCOUNTER (OUTPATIENT)
Age: 25
Discharge: HOME | End: 2025-07-15
Payer: MEDICAID

## 2025-07-15 ENCOUNTER — APPOINTMENT (OUTPATIENT)
Dept: GENERAL RADIOLOGY | Age: 25
End: 2025-07-15
Attending: NURSE PRACTITIONER
Payer: MEDICAID

## 2025-07-15 VITALS
HEART RATE: 72 BPM | TEMPERATURE: 98 F | SYSTOLIC BLOOD PRESSURE: 136 MMHG | DIASTOLIC BLOOD PRESSURE: 76 MMHG | WEIGHT: 293 LBS | HEIGHT: 65 IN | BODY MASS INDEX: 48.82 KG/M2 | OXYGEN SATURATION: 97 % | RESPIRATION RATE: 20 BRPM

## 2025-07-15 DIAGNOSIS — J40 SINOBRONCHITIS: Primary | ICD-10-CM

## 2025-07-15 DIAGNOSIS — J32.9 SINOBRONCHITIS: Primary | ICD-10-CM

## 2025-07-15 LAB — S PYO AG THROAT QL: NEGATIVE

## 2025-07-15 PROCEDURE — 71046 X-RAY EXAM CHEST 2 VIEWS: CPT | Performed by: NURSE PRACTITIONER

## 2025-07-15 PROCEDURE — 99214 OFFICE O/P EST MOD 30 MIN: CPT | Performed by: NURSE PRACTITIONER

## 2025-07-15 PROCEDURE — 87880 STREP A ASSAY W/OPTIC: CPT | Performed by: NURSE PRACTITIONER

## 2025-07-15 RX ORDER — BENZONATATE 100 MG/1
100 CAPSULE ORAL 3 TIMES DAILY PRN
Qty: 30 CAPSULE | Refills: 0 | Status: SHIPPED | OUTPATIENT
Start: 2025-07-15 | End: 2025-08-14

## 2025-07-15 NOTE — DISCHARGE INSTRUCTIONS
6/26 Left Voicemail (1st Attempt) for the patient to call back and schedule the following:    Appointment type: Return Dermatology  Provider: James / Oz  Return date: Next Available  Specialty phone number: [phone]  Additional appointment(s) needed: na  Additonal Notes: Medication Refill. Previous Jay's pt.. Okay to use MAYELA per Francheska.      Take antibiotic as directed.  Take the Tessalon as needed for cough.  Tylenol and Motrin as needed for pain.

## 2025-07-28 RX ORDER — METOPROLOL TARTRATE 25 MG/1
25 TABLET, FILM COATED ORAL 2 TIMES DAILY
Qty: 180 TABLET | Refills: 0 | Status: SHIPPED | OUTPATIENT
Start: 2025-07-28

## 2025-08-01 ENCOUNTER — TELEPHONE (OUTPATIENT)
Facility: CLINIC | Age: 25
End: 2025-08-01

## 2025-08-01 ENCOUNTER — OFFICE VISIT (OUTPATIENT)
Facility: CLINIC | Age: 25
End: 2025-08-01

## 2025-08-01 VITALS
BODY MASS INDEX: 48.82 KG/M2 | HEIGHT: 65 IN | DIASTOLIC BLOOD PRESSURE: 66 MMHG | SYSTOLIC BLOOD PRESSURE: 118 MMHG | HEART RATE: 82 BPM | WEIGHT: 293 LBS

## 2025-08-01 DIAGNOSIS — Z01.818 PREPROCEDURAL EXAMINATION: Primary | ICD-10-CM

## 2025-08-01 DIAGNOSIS — Z30.430 ENCOUNTER FOR INSERTION OF INTRAUTERINE CONTRACEPTIVE DEVICE (IUD): ICD-10-CM

## 2025-08-01 LAB
CONTROL LINE PRESENT WITH A CLEAR BACKGROUND (YES/NO): YES YES/NO
KIT LOT #: NORMAL NUMERIC
PREGNANCY TEST, URINE: NEGATIVE

## 2025-08-01 PROCEDURE — 58300 INSERT INTRAUTERINE DEVICE: CPT | Performed by: STUDENT IN AN ORGANIZED HEALTH CARE EDUCATION/TRAINING PROGRAM

## 2025-08-01 PROCEDURE — 81025 URINE PREGNANCY TEST: CPT | Performed by: STUDENT IN AN ORGANIZED HEALTH CARE EDUCATION/TRAINING PROGRAM

## 2025-08-05 ENCOUNTER — HOSPITAL ENCOUNTER (EMERGENCY)
Facility: HOSPITAL | Age: 25
Discharge: HOME | End: 2025-08-05
Attending: EMERGENCY MEDICINE

## 2025-08-05 ENCOUNTER — APPOINTMENT (OUTPATIENT)
Dept: CT IMAGING | Facility: HOSPITAL | Age: 25
End: 2025-08-05
Attending: EMERGENCY MEDICINE

## 2025-08-05 ENCOUNTER — TELEPHONE (OUTPATIENT)
Dept: FAMILY MEDICINE CLINIC | Facility: CLINIC | Age: 25
End: 2025-08-05

## 2025-08-05 VITALS
DIASTOLIC BLOOD PRESSURE: 64 MMHG | OXYGEN SATURATION: 98 % | RESPIRATION RATE: 16 BRPM | WEIGHT: 293 LBS | HEIGHT: 65 IN | SYSTOLIC BLOOD PRESSURE: 103 MMHG | HEART RATE: 79 BPM | BODY MASS INDEX: 48.82 KG/M2 | TEMPERATURE: 98 F

## 2025-08-05 DIAGNOSIS — N30.00 ACUTE CYSTITIS WITHOUT HEMATURIA: ICD-10-CM

## 2025-08-05 DIAGNOSIS — R10.10 UPPER ABDOMINAL PAIN: Primary | ICD-10-CM

## 2025-08-05 LAB
ALBUMIN SERPL-MCNC: 4.9 G/DL (ref 3.2–4.8)
ALBUMIN/GLOB SERPL: 1.8 (ref 1–2)
ALP LIVER SERPL-CCNC: 85 U/L
ALT SERPL-CCNC: 16 U/L
ANION GAP SERPL CALC-SCNC: 10 MMOL/L (ref 0–18)
AST SERPL-CCNC: 19 U/L (ref ?–34)
B-HCG UR QL: NEGATIVE
BASOPHILS # BLD AUTO: 0.06 X10(3) UL (ref 0–0.2)
BASOPHILS NFR BLD AUTO: 0.8 %
BILIRUB SERPL-MCNC: 0.5 MG/DL (ref 0.3–1.2)
BILIRUB UR QL STRIP.AUTO: NEGATIVE
BUN BLD-MCNC: 8 MG/DL (ref 9–23)
CALCIUM BLD-MCNC: 9.5 MG/DL (ref 8.7–10.6)
CHLORIDE SERPL-SCNC: 101 MMOL/L (ref 98–112)
CO2 SERPL-SCNC: 27 MMOL/L (ref 21–32)
COLOR UR AUTO: YELLOW
CREAT BLD-MCNC: 0.65 MG/DL
EGFRCR SERPLBLD CKD-EPI 2021: 127 ML/MIN/1.73M2 (ref 60–?)
EOSINOPHIL # BLD AUTO: 0.65 X10(3) UL (ref 0–0.7)
EOSINOPHIL NFR BLD AUTO: 8.6 %
ERYTHROCYTE [DISTWIDTH] IN BLOOD BY AUTOMATED COUNT: 13.1 %
GLOBULIN PLAS-MCNC: 2.7 G/DL (ref 2–3.5)
GLUCOSE BLD-MCNC: 107 MG/DL (ref 70–99)
GLUCOSE UR STRIP.AUTO-MCNC: NORMAL MG/DL
HCT VFR BLD AUTO: 38.4 %
HGB BLD-MCNC: 12.8 G/DL (ref 7–20)
IMM GRANULOCYTES # BLD AUTO: 0.01 X10(3) UL (ref 0–1)
IMM GRANULOCYTES NFR BLD: 0.1 %
KETONES UR STRIP.AUTO-MCNC: NEGATIVE MG/DL
LEUKOCYTE ESTERASE UR QL STRIP.AUTO: 500
LIPASE SERPL-CCNC: 25 U/L (ref 12–53)
LYMPHOCYTES # BLD AUTO: 2.34 X10(3) UL (ref 1–4)
LYMPHOCYTES NFR BLD AUTO: 30.8 %
MCH RBC QN AUTO: 28.8 PG (ref 26–34)
MCHC RBC AUTO-ENTMCNC: 33.3 G/DL (ref 31–37)
MCV RBC AUTO: 86.5 FL
MONOCYTES # BLD AUTO: 0.36 X10(3) UL (ref 0.1–1)
MONOCYTES NFR BLD AUTO: 4.7 %
NEUTROPHILS # BLD AUTO: 4.17 X10 (3) UL (ref 1.5–7.7)
NEUTROPHILS # BLD AUTO: 4.17 X10(3) UL (ref 1.5–7.7)
NEUTROPHILS NFR BLD AUTO: 55 %
NITRITE UR QL STRIP.AUTO: NEGATIVE
OSMOLALITY SERPL CALC.SUM OF ELEC: 285 MOSM/KG (ref 275–295)
PH UR STRIP.AUTO: 7 (ref 5–8)
PLATELET # BLD AUTO: 267 10(3)UL (ref 150–450)
POTASSIUM SERPL-SCNC: 3.6 MMOL/L (ref 3.5–5.1)
PROT SERPL-MCNC: 7.6 G/DL (ref 5.7–8.2)
RBC # BLD AUTO: 4.44 X10(6)UL
RBC UR QL AUTO: NEGATIVE
SODIUM SERPL-SCNC: 138 MMOL/L (ref 136–145)
SP GR UR STRIP.AUTO: 1.02 (ref 1–1.03)
UROBILINOGEN UR STRIP.AUTO-MCNC: 2 MG/DL
WBC # BLD AUTO: 7.6 X10(3) UL (ref 4–11)

## 2025-08-05 PROCEDURE — 85025 COMPLETE CBC W/AUTO DIFF WBC: CPT

## 2025-08-05 PROCEDURE — 81001 URINALYSIS AUTO W/SCOPE: CPT | Performed by: EMERGENCY MEDICINE

## 2025-08-05 PROCEDURE — 81025 URINE PREGNANCY TEST: CPT

## 2025-08-05 PROCEDURE — 74177 CT ABD & PELVIS W/CONTRAST: CPT | Performed by: EMERGENCY MEDICINE

## 2025-08-05 PROCEDURE — 99284 EMERGENCY DEPT VISIT MOD MDM: CPT

## 2025-08-05 PROCEDURE — 85025 COMPLETE CBC W/AUTO DIFF WBC: CPT | Performed by: EMERGENCY MEDICINE

## 2025-08-05 PROCEDURE — 83690 ASSAY OF LIPASE: CPT | Performed by: EMERGENCY MEDICINE

## 2025-08-05 PROCEDURE — 99285 EMERGENCY DEPT VISIT HI MDM: CPT

## 2025-08-05 PROCEDURE — 80053 COMPREHEN METABOLIC PANEL: CPT

## 2025-08-05 PROCEDURE — 96360 HYDRATION IV INFUSION INIT: CPT

## 2025-08-05 PROCEDURE — 80053 COMPREHEN METABOLIC PANEL: CPT | Performed by: EMERGENCY MEDICINE

## 2025-08-05 RX ORDER — NITROFURANTOIN 25; 75 MG/1; MG/1
100 CAPSULE ORAL 2 TIMES DAILY
Qty: 10 CAPSULE | Refills: 0 | Status: SHIPPED | OUTPATIENT
Start: 2025-08-05 | End: 2025-08-10

## 2025-08-12 ENCOUNTER — HOSPITAL ENCOUNTER (EMERGENCY)
Age: 25
Discharge: HOME | End: 2025-08-12

## 2025-08-12 ENCOUNTER — APPOINTMENT (OUTPATIENT)
Dept: GENERAL RADIOLOGY | Age: 25
End: 2025-08-12

## 2025-08-12 VITALS
WEIGHT: 293 LBS | HEIGHT: 65 IN | TEMPERATURE: 98 F | BODY MASS INDEX: 48.82 KG/M2 | OXYGEN SATURATION: 99 % | DIASTOLIC BLOOD PRESSURE: 77 MMHG | HEART RATE: 75 BPM | RESPIRATION RATE: 16 BRPM | SYSTOLIC BLOOD PRESSURE: 128 MMHG

## 2025-08-12 DIAGNOSIS — S80.02XA CONTUSION OF LEFT KNEE, INITIAL ENCOUNTER: Primary | ICD-10-CM

## 2025-08-12 PROCEDURE — 73562 X-RAY EXAM OF KNEE 3: CPT

## 2025-08-12 PROCEDURE — 99283 EMERGENCY DEPT VISIT LOW MDM: CPT

## 2025-08-12 PROCEDURE — 99282 EMERGENCY DEPT VISIT SF MDM: CPT

## 2025-08-28 ENCOUNTER — TELEPHONE (OUTPATIENT)
Dept: FAMILY MEDICINE CLINIC | Facility: CLINIC | Age: 25
End: 2025-08-28

## 2025-08-28 ENCOUNTER — APPOINTMENT (OUTPATIENT)
Dept: CT IMAGING | Facility: HOSPITAL | Age: 25
End: 2025-08-28
Attending: STUDENT IN AN ORGANIZED HEALTH CARE EDUCATION/TRAINING PROGRAM

## 2025-08-28 ENCOUNTER — HOSPITAL ENCOUNTER (EMERGENCY)
Facility: HOSPITAL | Age: 25
Discharge: HOME | End: 2025-08-28
Attending: STUDENT IN AN ORGANIZED HEALTH CARE EDUCATION/TRAINING PROGRAM

## 2025-08-28 VITALS
RESPIRATION RATE: 16 BRPM | OXYGEN SATURATION: 97 % | HEART RATE: 86 BPM | BODY MASS INDEX: 48.82 KG/M2 | TEMPERATURE: 98 F | HEIGHT: 65 IN | DIASTOLIC BLOOD PRESSURE: 86 MMHG | SYSTOLIC BLOOD PRESSURE: 142 MMHG | WEIGHT: 293 LBS

## 2025-08-28 DIAGNOSIS — N10 ACUTE PYELONEPHRITIS: ICD-10-CM

## 2025-08-28 DIAGNOSIS — R10.9 FLANK PAIN: Primary | ICD-10-CM

## 2025-08-28 DIAGNOSIS — R10.10 UPPER ABDOMINAL PAIN: Primary | ICD-10-CM

## 2025-08-28 LAB
ALBUMIN SERPL-MCNC: 4.6 G/DL (ref 3.2–4.8)
ALBUMIN/GLOB SERPL: 1.7 (ref 1–2)
ALP LIVER SERPL-CCNC: 86 U/L
ALT SERPL-CCNC: 15 U/L
ANION GAP SERPL CALC-SCNC: 13 MMOL/L (ref 0–18)
AST SERPL-CCNC: 17 U/L (ref ?–34)
B-HCG UR QL: NEGATIVE
BASOPHILS # BLD AUTO: 0.05 X10(3) UL (ref 0–0.2)
BASOPHILS NFR BLD AUTO: 0.6 %
BILIRUB SERPL-MCNC: 0.3 MG/DL (ref 0.3–1.2)
BILIRUB UR QL STRIP.AUTO: NEGATIVE
BUN BLD-MCNC: 10 MG/DL (ref 9–23)
CALCIUM BLD-MCNC: 9.4 MG/DL (ref 8.7–10.6)
CHLORIDE SERPL-SCNC: 103 MMOL/L (ref 98–112)
CO2 SERPL-SCNC: 24 MMOL/L (ref 21–32)
COLOR UR AUTO: YELLOW
CREAT BLD-MCNC: 0.66 MG/DL
D DIMER PPP FEU-MCNC: 1.05 UG/ML FEU (ref ?–0.5)
EGFRCR SERPLBLD CKD-EPI 2021: 126 ML/MIN/1.73M2 (ref 60–?)
EOSINOPHIL # BLD AUTO: 0.48 X10(3) UL (ref 0–0.7)
EOSINOPHIL NFR BLD AUTO: 5.9 %
ERYTHROCYTE [DISTWIDTH] IN BLOOD BY AUTOMATED COUNT: 12.8 %
GLOBULIN PLAS-MCNC: 2.7 G/DL (ref 2–3.5)
GLUCOSE BLD-MCNC: 111 MG/DL (ref 70–99)
GLUCOSE UR STRIP.AUTO-MCNC: NORMAL MG/DL
HCT VFR BLD AUTO: 39.5 %
HGB BLD-MCNC: 13.5 G/DL (ref 7–20)
IMM GRANULOCYTES # BLD AUTO: 0.01 X10(3) UL (ref 0–1)
IMM GRANULOCYTES NFR BLD: 0.1 %
KETONES UR STRIP.AUTO-MCNC: NEGATIVE MG/DL
LEUKOCYTE ESTERASE UR QL STRIP.AUTO: 500
LIPASE SERPL-CCNC: 29 U/L (ref 12–53)
LYMPHOCYTES # BLD AUTO: 2.6 X10(3) UL (ref 1–4)
LYMPHOCYTES NFR BLD AUTO: 31.9 %
MCH RBC QN AUTO: 29.3 PG (ref 26–34)
MCHC RBC AUTO-ENTMCNC: 34.2 G/DL (ref 31–37)
MCV RBC AUTO: 85.9 FL
MONOCYTES # BLD AUTO: 0.58 X10(3) UL (ref 0.1–1)
MONOCYTES NFR BLD AUTO: 7.1 %
NEUTROPHILS # BLD AUTO: 4.44 X10 (3) UL (ref 1.5–7.7)
NEUTROPHILS # BLD AUTO: 4.44 X10(3) UL (ref 1.5–7.7)
NEUTROPHILS NFR BLD AUTO: 54.4 %
NITRITE UR QL STRIP.AUTO: NEGATIVE
OSMOLALITY SERPL CALC.SUM OF ELEC: 290 MOSM/KG (ref 275–295)
PH UR STRIP.AUTO: 6 (ref 5–8)
PLATELET # BLD AUTO: 285 10(3)UL (ref 150–450)
POTASSIUM SERPL-SCNC: 3.9 MMOL/L (ref 3.5–5.1)
PROT SERPL-MCNC: 7.3 G/DL (ref 5.7–8.2)
PROT UR STRIP.AUTO-MCNC: 20 MG/DL
RBC # BLD AUTO: 4.6 X10(6)UL
SODIUM SERPL-SCNC: 140 MMOL/L (ref 136–145)
SP GR UR STRIP.AUTO: >1.03 (ref 1–1.03)
UROBILINOGEN UR STRIP.AUTO-MCNC: NORMAL MG/DL
WBC # BLD AUTO: 8.2 X10(3) UL (ref 4–11)

## 2025-08-28 PROCEDURE — 87086 URINE CULTURE/COLONY COUNT: CPT | Performed by: STUDENT IN AN ORGANIZED HEALTH CARE EDUCATION/TRAINING PROGRAM

## 2025-08-28 PROCEDURE — 80053 COMPREHEN METABOLIC PANEL: CPT | Performed by: STUDENT IN AN ORGANIZED HEALTH CARE EDUCATION/TRAINING PROGRAM

## 2025-08-28 PROCEDURE — 85025 COMPLETE CBC W/AUTO DIFF WBC: CPT | Performed by: STUDENT IN AN ORGANIZED HEALTH CARE EDUCATION/TRAINING PROGRAM

## 2025-08-28 PROCEDURE — 81025 URINE PREGNANCY TEST: CPT

## 2025-08-28 PROCEDURE — 83690 ASSAY OF LIPASE: CPT

## 2025-08-28 PROCEDURE — 85379 FIBRIN DEGRADATION QUANT: CPT | Performed by: STUDENT IN AN ORGANIZED HEALTH CARE EDUCATION/TRAINING PROGRAM

## 2025-08-28 PROCEDURE — 85025 COMPLETE CBC W/AUTO DIFF WBC: CPT

## 2025-08-28 PROCEDURE — 96375 TX/PRO/DX INJ NEW DRUG ADDON: CPT

## 2025-08-28 PROCEDURE — 96365 THER/PROPH/DIAG IV INF INIT: CPT

## 2025-08-28 PROCEDURE — 83690 ASSAY OF LIPASE: CPT | Performed by: STUDENT IN AN ORGANIZED HEALTH CARE EDUCATION/TRAINING PROGRAM

## 2025-08-28 PROCEDURE — 81001 URINALYSIS AUTO W/SCOPE: CPT | Performed by: STUDENT IN AN ORGANIZED HEALTH CARE EDUCATION/TRAINING PROGRAM

## 2025-08-28 PROCEDURE — 71275 CT ANGIOGRAPHY CHEST: CPT | Performed by: STUDENT IN AN ORGANIZED HEALTH CARE EDUCATION/TRAINING PROGRAM

## 2025-08-28 PROCEDURE — 81001 URINALYSIS AUTO W/SCOPE: CPT

## 2025-08-28 PROCEDURE — 99285 EMERGENCY DEPT VISIT HI MDM: CPT

## 2025-08-28 PROCEDURE — 80053 COMPREHEN METABOLIC PANEL: CPT

## 2025-08-28 PROCEDURE — 99284 EMERGENCY DEPT VISIT MOD MDM: CPT

## 2025-08-28 RX ORDER — KETOROLAC TROMETHAMINE 15 MG/ML
15 INJECTION, SOLUTION INTRAMUSCULAR; INTRAVENOUS ONCE
Status: COMPLETED | OUTPATIENT
Start: 2025-08-28 | End: 2025-08-28

## 2025-08-28 RX ORDER — CEPHALEXIN 500 MG/1
500 CAPSULE ORAL 4 TIMES DAILY
Qty: 40 CAPSULE | Refills: 0 | Status: SHIPPED | OUTPATIENT
Start: 2025-08-28 | End: 2025-09-07

## (undated) NOTE — MR AVS SNAPSHOT
[address] [place] [id number] [phone]               Thank you for choosing us for your health care visit with Alon Hartley MD.  We are glad to serve you and happy to provide you with this summary of your v Take 100 mg by mouth. Commonly known as:  Earmon Number     Sign up for [email or web address] access for your child.   [email or web address] access allows you to view health information for your child from their recent   visit, view other health information and mo

## (undated) NOTE — LETTER
08/19/24      Roxane Tyler   [address] [place] [address]           Dear Roxane Tyler     Our records indicate that you have outstanding lab work and/or testing that was ordered for you and has not yet been completed:  Lab Frequency Next Occurrence             Lipid Panel Once 08/19/2024   Hepatic Function Panel (7) [E] Once 08/19/2024      To provide you with the best possible care, please complete these orders at your earliest convenience. If you have recently completed these orders please disregard this letter.   To schedule imaging, or outpatient tests please call Central Scheduling at [phone].  For any blood work needed, you can get this done at the Reference Lab in our [place] office anytime Monday-Friday from 7:30am-4:00pm and you do not need an appointment. They are closed for lunch between 12-1pm. They are closed Saturday and Sunday. If you need a time outside of these hours please call us to schedule an appointment.   *If you prefer to use [facility] for your labs please let us know so we can fax your orders.     Thank you,    [facility] [place]

## (undated) NOTE — ED AVS SNAPSHOT
Marion Castillo   MRN: [id number]    Department:  [facility] Emergency Department   Date of Visit:  10/31/2018           Disclosure     Insurance plans vary and the physician(s) referred by the ER may not be covered by your plan.  Please contact y tell this physician (or your personal doctor if your instructions are to return to your personal doctor) about any new or lasting problems. The primary care or specialist physician will see patients referred from the [facility] Emergency Department.  Meka Luke

## (undated) NOTE — MR AVS SNAPSHOT
[address] [address] [phone]               Thank you for choosing us for your health care visit with Aida Cheung MD.  We are glad to serve you and happy to provide you with this summary of your v Commonly known as:  GEODON           * Ziprasidone HCl 20 MG Caps   Take 20 mg by mouth daily. What changed:  Another medication with the same name was removed. Continue taking this medication, and follow the directions you see here.    Commonly known as:

## (undated) NOTE — LETTER
Date & Time: 10/31/2018, 9:19 PM  Patient: Valeria Llamas  Encounter Provider(s):    Bing Contreras MD       To Whom It May Concern:    Kaylynn Rincon was seen and treated in our department on 10/31/2018.  She may return to school with no contact sports

## (undated) NOTE — ED AVS SNAPSHOT
THE [facility] in [place] [address] [address]    Phone:  [phone]    Fax:  [address]   MRN: [id number]    Department:  THE [facility] in [place]   Date of Visit:  1/19/2017           Diana Compression: Compression to the area will help control swelling. An ace wrap is the most simple form of compression. You can also wear a brace. Elevation: Raise the injured extremity above heart level.  This will reduce throbbing pain and swelling associ receive this, we would really appreciate it if you could take the time to complete it. Thank you! You were examined and treated today on an urgent basis only. This was not a substitute for ongoing medical care.  Often, one Immediate Care visit does no [place] [facility] [address] ([address]) [address]  [place] (Ezra Broderick) [address] ([facility]) [phone] [address] [facility]. ([address]) 582- more than likely within normal limits. No other evidence of fracture or dislocation. If there is high clinical suspicion for fracture dislocation, follow up radiographs in 2 weeks   may be obtained.       Dictated by: Josh Vazquez MD on 1/19/2017 at 12

## (undated) NOTE — LETTER
Date: 9/19/2020    Patient Name: Daniel Weber          To Whom it may concern: This letter has been written at the patient's request. The above patient was seen at the [facility] for treatment of a medical condition.     This patient sh